# Patient Record
Sex: MALE | Race: WHITE | NOT HISPANIC OR LATINO | Employment: OTHER | URBAN - METROPOLITAN AREA
[De-identification: names, ages, dates, MRNs, and addresses within clinical notes are randomized per-mention and may not be internally consistent; named-entity substitution may affect disease eponyms.]

---

## 2017-01-31 ENCOUNTER — HOSPITAL ENCOUNTER (EMERGENCY)
Facility: HOSPITAL | Age: 72
Discharge: HOME | End: 2017-02-01
Attending: EMERGENCY MEDICINE | Admitting: EMERGENCY MEDICINE

## 2017-01-31 ENCOUNTER — APPOINTMENT (OUTPATIENT)
Dept: MRI IMAGING | Facility: HOSPITAL | Age: 72
End: 2017-01-31
Attending: EMERGENCY MEDICINE

## 2017-01-31 ENCOUNTER — APPOINTMENT (OUTPATIENT)
Dept: GENERAL RADIOLOGY | Facility: HOSPITAL | Age: 72
End: 2017-01-31

## 2017-01-31 DIAGNOSIS — H81.10 BENIGN POSITIONAL VERTIGO, UNSPECIFIED LATERALITY: Primary | ICD-10-CM

## 2017-01-31 LAB
ALBUMIN SERPL-MCNC: 4.5 G/DL (ref 3.5–5.2)
ALBUMIN/GLOB SERPL: 1.4 G/DL
ALP SERPL-CCNC: 66 U/L (ref 39–117)
ALT SERPL W P-5'-P-CCNC: 30 U/L (ref 1–41)
ANION GAP SERPL CALCULATED.3IONS-SCNC: 12.4 MMOL/L
AST SERPL-CCNC: 25 U/L (ref 1–40)
BACTERIA UR QL AUTO: ABNORMAL /HPF
BASOPHILS # BLD AUTO: 0.01 10*3/MM3 (ref 0–0.2)
BASOPHILS NFR BLD AUTO: 0.1 % (ref 0–1.5)
BILIRUB SERPL-MCNC: 0.5 MG/DL (ref 0.1–1.2)
BILIRUB UR QL STRIP: NEGATIVE
BUN BLD-MCNC: 27 MG/DL (ref 8–23)
BUN/CREAT SERPL: 31.8 (ref 7–25)
CALCIUM SPEC-SCNC: 9.5 MG/DL (ref 8.6–10.5)
CHLORIDE SERPL-SCNC: 100 MMOL/L (ref 98–107)
CLARITY UR: CLEAR
CO2 SERPL-SCNC: 25.6 MMOL/L (ref 22–29)
COLOR UR: YELLOW
CREAT BLD-MCNC: 0.85 MG/DL (ref 0.76–1.27)
DEPRECATED RDW RBC AUTO: 43.1 FL (ref 37–54)
EOSINOPHIL # BLD AUTO: 0 10*3/MM3 (ref 0–0.7)
EOSINOPHIL NFR BLD AUTO: 0 % (ref 0.3–6.2)
ERYTHROCYTE [DISTWIDTH] IN BLOOD BY AUTOMATED COUNT: 14 % (ref 11.5–14.5)
GFR SERPL CREATININE-BSD FRML MDRD: 89 ML/MIN/1.73
GLOBULIN UR ELPH-MCNC: 3.2 GM/DL
GLUCOSE BLD-MCNC: 145 MG/DL (ref 65–99)
GLUCOSE UR STRIP-MCNC: NEGATIVE MG/DL
HCT VFR BLD AUTO: 47.9 % (ref 40.4–52.2)
HGB BLD-MCNC: 16.1 G/DL (ref 13.7–17.6)
HGB UR QL STRIP.AUTO: ABNORMAL
HOLD SPECIMEN: NORMAL
HYALINE CASTS UR QL AUTO: ABNORMAL /LPF
IMM GRANULOCYTES # BLD: 0 10*3/MM3 (ref 0–0.03)
IMM GRANULOCYTES NFR BLD: 0 % (ref 0–0.5)
KETONES UR QL STRIP: NEGATIVE
LEUKOCYTE ESTERASE UR QL STRIP.AUTO: NEGATIVE
LYMPHOCYTES # BLD AUTO: 0.59 10*3/MM3 (ref 0.9–4.8)
LYMPHOCYTES NFR BLD AUTO: 7.3 % (ref 19.6–45.3)
MAGNESIUM SERPL-MCNC: 2.2 MG/DL (ref 1.6–2.4)
MCH RBC QN AUTO: 28.5 PG (ref 27–32.7)
MCHC RBC AUTO-ENTMCNC: 33.6 G/DL (ref 32.6–36.4)
MCV RBC AUTO: 84.9 FL (ref 79.8–96.2)
MONOCYTES # BLD AUTO: 0.21 10*3/MM3 (ref 0.2–1.2)
MONOCYTES NFR BLD AUTO: 2.6 % (ref 5–12)
NEUTROPHILS # BLD AUTO: 7.3 10*3/MM3 (ref 1.9–8.1)
NEUTROPHILS NFR BLD AUTO: 90 % (ref 42.7–76)
NITRITE UR QL STRIP: NEGATIVE
PH UR STRIP.AUTO: 6 [PH] (ref 5–8)
PLATELET # BLD AUTO: 227 10*3/MM3 (ref 140–500)
PMV BLD AUTO: 11 FL (ref 6–12)
POTASSIUM BLD-SCNC: 4.6 MMOL/L (ref 3.5–5.2)
PROT SERPL-MCNC: 7.7 G/DL (ref 6–8.5)
PROT UR QL STRIP: NEGATIVE
RBC # BLD AUTO: 5.64 10*6/MM3 (ref 4.6–6)
RBC # UR: ABNORMAL /HPF
REF LAB TEST METHOD: ABNORMAL
SODIUM BLD-SCNC: 138 MMOL/L (ref 136–145)
SP GR UR STRIP: 1.02 (ref 1–1.03)
SQUAMOUS #/AREA URNS HPF: ABNORMAL /HPF
TROPONIN T SERPL-MCNC: <0.01 NG/ML (ref 0–0.03)
UROBILINOGEN UR QL STRIP: ABNORMAL
WBC NRBC COR # BLD: 8.11 10*3/MM3 (ref 4.5–10.7)
WBC UR QL AUTO: ABNORMAL /HPF
WHOLE BLOOD HOLD SPECIMEN: NORMAL

## 2017-01-31 PROCEDURE — 80053 COMPREHEN METABOLIC PANEL: CPT | Performed by: EMERGENCY MEDICINE

## 2017-01-31 PROCEDURE — 36415 COLL VENOUS BLD VENIPUNCTURE: CPT | Performed by: EMERGENCY MEDICINE

## 2017-01-31 PROCEDURE — 71020 HC CHEST PA AND LATERAL: CPT

## 2017-01-31 PROCEDURE — 93005 ELECTROCARDIOGRAM TRACING: CPT

## 2017-01-31 PROCEDURE — 84484 ASSAY OF TROPONIN QUANT: CPT | Performed by: EMERGENCY MEDICINE

## 2017-01-31 PROCEDURE — 93010 ELECTROCARDIOGRAM REPORT: CPT | Performed by: INTERNAL MEDICINE

## 2017-01-31 PROCEDURE — 99284 EMERGENCY DEPT VISIT MOD MDM: CPT

## 2017-01-31 PROCEDURE — 85025 COMPLETE CBC W/AUTO DIFF WBC: CPT | Performed by: EMERGENCY MEDICINE

## 2017-01-31 PROCEDURE — 70551 MRI BRAIN STEM W/O DYE: CPT

## 2017-01-31 PROCEDURE — 81001 URINALYSIS AUTO W/SCOPE: CPT | Performed by: EMERGENCY MEDICINE

## 2017-01-31 PROCEDURE — 83735 ASSAY OF MAGNESIUM: CPT | Performed by: EMERGENCY MEDICINE

## 2017-01-31 RX ORDER — SODIUM CHLORIDE 0.9 % (FLUSH) 0.9 %
10 SYRINGE (ML) INJECTION AS NEEDED
Status: DISCONTINUED | OUTPATIENT
Start: 2017-01-31 | End: 2017-02-01 | Stop reason: HOSPADM

## 2017-02-01 ENCOUNTER — TELEPHONE (OUTPATIENT)
Dept: FAMILY MEDICINE CLINIC | Facility: CLINIC | Age: 72
End: 2017-02-01

## 2017-02-01 ENCOUNTER — TELEPHONE (OUTPATIENT)
Dept: SOCIAL WORK | Facility: HOSPITAL | Age: 72
End: 2017-02-01

## 2017-02-01 VITALS
DIASTOLIC BLOOD PRESSURE: 87 MMHG | SYSTOLIC BLOOD PRESSURE: 154 MMHG | WEIGHT: 200 LBS | OXYGEN SATURATION: 99 % | HEIGHT: 69 IN | TEMPERATURE: 96.4 F | RESPIRATION RATE: 18 BRPM | BODY MASS INDEX: 29.62 KG/M2 | HEART RATE: 70 BPM

## 2017-02-01 RX ORDER — MECLIZINE HYDROCHLORIDE 25 MG/1
25 TABLET ORAL 3 TIMES DAILY PRN
Qty: 10 TABLET | Refills: 0 | Status: SHIPPED | OUTPATIENT
Start: 2017-02-01 | End: 2018-06-11 | Stop reason: ALTCHOICE

## 2017-02-01 RX ORDER — MECLIZINE HYDROCHLORIDE 25 MG/1
25 TABLET ORAL 3 TIMES DAILY PRN
Qty: 30 TABLET | Refills: 1 | Status: SHIPPED | OUTPATIENT
Start: 2017-02-01 | End: 2017-08-01

## 2017-02-01 NOTE — ED NOTES
MRI screening form filled out and called to MRI tech Corrigan     Consuelo Elam RN  01/31/17 [id number]

## 2017-02-01 NOTE — ED NOTES
Pt states he woke up this morning around 0800 sweating and being dizzy. Pt is still dizzy. Pt also c/o LUQ abdominal discomfort.     Consuelo Elam RN  01/31/17 2020

## 2017-02-01 NOTE — ED PROVIDER NOTES
EMERGENCY DEPARTMENT ENCOUNTER    CHIEF COMPLAINT  Chief Complaint: Dizziness  History given by: patient  History limited by: none  Room Number: 18/18  PMD: Ankit Merritt MD      HPI:  Pt is a 71 y.o. male who presents complaining of dizziness onset this morning at 8AM when he got up. He describes his dizziness as feeling off-balanced and spinning. He says movement exacerbates his sx and being still helps to alleviate the sx. Currently doing better  He also c/o loss of appetite, mild nausea, chills, nasal congestion, increased tearing  He denies vomiting, tinnitus, visual changes, change in speech,  focal weakness in UE and LE bilat, HA, cough, fevers, chest pain, or SOB.    H/o arrhythmia and takes 2 baby ASA daily.     Duration:  Onset 8am  Onset: gradual  Timing: intermittent  Location: neuro  Radiation: N/A  Quality: spinning, off-balanced  Intensity/Severity: mild  Progression: unchanged  Associated Symptoms: loss of appetite, nausea, chills, nasal congestion, increased tearing  Aggravating Factors: movement, walking  Alleviating Factors: rest  Previous Episodes: denies  Treatment before arrival: none    PAST MEDICAL HISTORY  Active Ambulatory Problems     Diagnosis Date Noted   • CAD WITH 50% LAD plaque 02/12/2016   • Fatigue 02/12/2016   • White coat hypertension 02/12/2016   • Hyperlipidemia 02/12/2016   • Hypertension 02/12/2016   • Left ventricular hypertrophy 02/12/2016   • Palpitations 02/12/2016     Resolved Ambulatory Problems     Diagnosis Date Noted   • No Resolved Ambulatory Problems     Past Medical History   Diagnosis Date   • Cardiovascular risk factor    • Coronary artery disease        PAST SURGICAL HISTORY  Past Surgical History   Procedure Laterality Date   • Appendectomy     • Coronary angioplasty with stent placement         FAMILY HISTORY  Family History   Problem Relation Age of Onset   • Other Mother      CARDIAC DISORDER   • Other Father    • Stroke Father      CEREBROVASCULAR   •  Hypertension Other        SOCIAL HISTORY  Social History     Social History   • Marital status: Single     Spouse name: N/A   • Number of children: N/A   • Years of education: N/A     Occupational History   • Not on file.     Social History Main Topics   • Smoking status: Former Smoker   • Smokeless tobacco: Not on file   • Alcohol use No   • Drug use: No   • Sexual activity: Not on file     Other Topics Concern   • Not on file     Social History Narrative   • No narrative on file       ALLERGIES  Review of patient's allergies indicates no known allergies.    REVIEW OF SYSTEMS  Review of Systems   Constitutional: Positive for appetite change (loss of appetite) and chills. Negative for fever.   HENT: Positive for congestion, rhinorrhea and sinus pressure. Negative for sore throat and trouble swallowing.    Eyes: Negative for visual disturbance.        Increased tearing   Respiratory: Negative for cough and shortness of breath.    Cardiovascular: Negative for chest pain and leg swelling.   Gastrointestinal: Negative for abdominal pain, diarrhea and vomiting.   Endocrine: Negative.    Genitourinary: Negative for decreased urine volume and frequency.   Musculoskeletal: Negative for neck pain.   Skin: Negative for rash.   Allergic/Immunologic: Negative.    Neurological: Positive for dizziness and light-headedness. Negative for weakness and numbness.   Hematological: Negative.    Psychiatric/Behavioral: Negative.    All other systems reviewed and are negative.      PHYSICAL EXAM  ED Triage Vitals   Temp Heart Rate Resp BP SpO2   01/31/17 1603 01/31/17 1603 01/31/17 1603 01/31/17 1629 01/31/17 1603   96.4 °F (35.8 °C) 66 16 181/95 100 %      Temp src Heart Rate Source Patient Position BP Location FiO2 (%)   -- 01/31/17 2018 01/31/17 2018 01/31/17 2018 --    Monitor Lying Right arm        Physical Exam   Constitutional: He is oriented to person, place, and time and well-developed, well-nourished, and in no distress.   HENT:    Head: Normocephalic and atraumatic.   TMs clear bilat     Eyes: EOM are normal. Pupils are equal, round, and reactive to light. Right eye exhibits no nystagmus. Left eye exhibits no nystagmus.   Neck: Normal range of motion. Neck supple.   Cardiovascular: Normal rate, regular rhythm and normal heart sounds.    No murmur heard.  Pulmonary/Chest: Effort normal and breath sounds normal. No respiratory distress. He has no wheezes.   O2sats 100% on RA.    Abdominal: Soft. There is no tenderness. There is no rebound and no guarding.   Musculoskeletal: Normal range of motion. He exhibits no edema.   Neurological: He is alert and oriented to person, place, and time. He has normal sensation and normal strength. No cranial nerve deficit.   Finger to nose is normal.   No UE drift.   UE strength normal  No dysmetria.   Heel to shin normal bilat.   No focal deficits.   No ataxia on bedside ambulation.   Negative Romberg    Skin: Skin is warm and dry.   Psychiatric: Mood and affect normal.   Nursing note and vitals reviewed.      LAB RESULTS  Lab Results (last 24 hours)     Procedure Component Value Units Date/Time    CBC & Differential [[id number]] Collected:  01/31/17 1706    Specimen:  Blood Updated:  01/31/17 1735    Narrative:       The following orders were created for panel order CBC & Differential.  Procedure                               Abnormality         Status                     ---------                               -----------         ------                     CBC Auto Differential[[id number]]         Abnormal            Final result                 Please view results for these tests on the individual orders.    Comprehensive Metabolic Panel [[id number]]  (Abnormal) Collected:  01/31/17 1706    Specimen:  Blood Updated:  01/31/17 1746     Glucose 145 (H) mg/dL      BUN 27 (H) mg/dL      Creatinine 0.85 mg/dL      Sodium 138 mmol/L      Potassium 4.6 mmol/L      Chloride 100 mmol/L      CO2 25.6 mmol/L      Calcium  9.5 mg/dL      Total Protein 7.7 g/dL      Albumin 4.50 g/dL      ALT (SGPT) 30 U/L      AST (SGOT) 25 U/L      Alkaline Phosphatase 66 U/L      Total Bilirubin 0.5 mg/dL      eGFR Non African Amer 89 mL/min/1.73      Globulin 3.2 gm/dL      A/G Ratio 1.4 g/dL      BUN/Creatinine Ratio 31.8 (H)      Anion Gap 12.4 mmol/L     Narrative:       The MDRD GFR formula is only valid for adults with stable renal function between ages 18 and 70.    Troponin [[id number]]  (Normal) Collected:  01/31/17 1706    Specimen:  Blood Updated:  01/31/17 1748     Troponin T <0.010 ng/mL     Narrative:       Troponin T Reference Ranges:  Less than 0.03 ng/mL:    Negative for AMI  0.03 to 0.09 ng/mL:      Indeterminant for AMI  Greater than 0.09 ng/mL: Positive for AMI    Magnesium [[id number]]  (Normal) Collected:  01/31/17 1706    Specimen:  Blood Updated:  01/31/17 1746     Magnesium 2.2 mg/dL     CBC Auto Differential [[id number]]  (Abnormal) Collected:  01/31/17 1706    Specimen:  Blood Updated:  01/31/17 1735     WBC 8.11 10*3/mm3      RBC 5.64 10*6/mm3      Hemoglobin 16.1 g/dL      Hematocrit 47.9 %      MCV 84.9 fL      MCH 28.5 pg      MCHC 33.6 g/dL      RDW 14.0 %      RDW-SD 43.1 fl      MPV 11.0 fL      Platelets 227 10*3/mm3      Neutrophil % 90.0 (H) %      Lymphocyte % 7.3 (L) %      Monocyte % 2.6 (L) %      Eosinophil % 0.0 (L) %      Basophil % 0.1 %      Immature Grans % 0.0 %      Neutrophils, Absolute 7.30 10*3/mm3      Lymphocytes, Absolute 0.59 (L) 10*3/mm3      Monocytes, Absolute 0.21 10*3/mm3      Eosinophils, Absolute 0.00 10*3/mm3      Basophils, Absolute 0.01 10*3/mm3      Immature Grans, Absolute 0.00 10*3/mm3     Urinalysis With / Culture If Indicated [[id number]]  (Abnormal) Collected:  01/31/17 2120    Specimen:  Urine from Urine, Clean Catch Updated:  01/31/17 2142     Color, UA Yellow      Appearance, UA Clear      pH, UA 6.0      Specific Gravity, UA 1.018      Glucose, UA Negative      Ketones, UA Negative       Bilirubin, UA Negative      Blood, UA Moderate (2+) (A)      Protein, UA Negative      Leuk Esterase, UA Negative      Nitrite, UA Negative      Urobilinogen, UA 0.2 E.U./dL     Urinalysis, Microscopic Only [[id number]]  (Abnormal) Collected:  01/31/17 2120    Specimen:  Urine from Urine, Clean Catch Updated:  01/31/17 2143     RBC, UA 3-5 (A) /HPF      WBC, UA 0-2 (A) /HPF      Bacteria, UA None Seen /HPF      Squamous Epithelial Cells, UA 0-2 /HPF      Hyaline Casts, UA 0-2 /LPF      Methodology Automated Microscopy           I ordered the above labs and reviewed the results    RADIOLOGY  MRI Brain Without Contrast   Preliminary Result   No acute intracranial pathology identified. Small vessel ischemic   disease related changes and cortical atrophy.                  XR Chest 2 View   Final Result   No evidence for acute pulmonary process. Follow-up as   clinical indications persist.       This report was finalized on 1/31/2017 5:28 PM by Dr. David Corbin MD.               I ordered the above noted radiological studies. Interpreted by radiologist. Discussed with radiologist (Santino). Reviewed by me in PACS.       PROCEDURES  Procedures      PROGRESS AND CONSULTS  ED Course   Pt is not a tPA candidate due to length of duration of sx and low NIH score.    8:55 PM  Pt is able to ambulate at bed-side without complication. He says feeling a little dizzy when standing.   D/w pt risks/benefits for MRI, pt agrees with plan of care.   Ordered MRI for further evaluation.     9:41 PM  Ordered UA for further evaluation.     12:01 AM  Pt rechecked and is resting comfortably in NAD with stable vitals. D/w pt labs and MRI Brain results which showed no serious abnormalities. Decision to d/c was discussed. Pt was instructed to f/u with PMD. RTED warnings given. Pt understands and agrees with plan of care, all questions and concerns addressed.       MEDICAL DECISION MAKING  Results were reviewed/discussed with the patient  and they were also made aware of online access. Pt also made aware that some labs, such as cultures, will not be resulted during ER visit and follow up with PMD is necessary.     MDM  Number of Diagnoses or Management Options     Amount and/or Complexity of Data Reviewed  Clinical lab tests: reviewed and ordered (GLU - 145  BUN - 27  )  Tests in the radiology section of CPT®: reviewed and ordered (MRI BRAIN - negative acute     Independently viewed by me. Interpreted by radiologist. Discussed with Radiologist.         CXR - negative acute.    Independently viewed by me. Interpreted by radiologist  )  Tests in the medicine section of CPT®: reviewed and ordered (EKG           EKG time: 1954  Rhythm/Rate: 75, SR  P waves and MT: 1st degree AV block  QRS, axis: narrow QRS, normal axis  ST and T waves: nonspecific ST and T wave changes     Interpreted Contemporaneously by me, independently viewed  unchanged compared to prior 5/2015  )  Discussion of test results with the performing providers: yes (Dr. De - radiology )           DIAGNOSIS  Final diagnoses:   Benign positional vertigo, unspecified laterality       DISPOSITION  Discharged    DISCHARGE    Patient discharged in stable condition.    Reviewed implications of results, diagnosis, meds, responsibility to follow up, warning signs and symptoms of possible worsening, potential complications and reasons to return to ER.    Patient/Family voiced understanding of above instructions.    Discussed plan for discharge, as there is no emergent indication for admission.  Pt/family is agreeable and understands need for follow up and repeat testing.  Pt is aware that discharge does not mean that nothing is wrong but it indicates no emergency is present that requires admission and they must continue care with follow-up as given below or physician of their choice.     FOLLOW-UP  Ankit Merritt MD  [address] [place], [place] [id number] [phone]    In 2 days  Return if  pain, If symptoms worsen, shortness of breath,, fever, any concerns         Medication List      New Prescriptions          meclizine 25 MG tablet   Commonly known as:  ANTIVERT   Take 1 tablet by mouth 3 (Three) Times a Day As Needed for dizziness for   up to 10 doses.         Changed          ramipril 5 MG capsule   Commonly known as:  ALTACE   Take 1 capsule by mouth daily.   What changed:  Another medication with the same name was removed. Continue   taking this medication, and follow the directions you see here.               Latest Documented Vital Signs:  As of 12:12 AM  BP- 143/86 HR- 74 Temp- 96.4 °F (35.8 °C) O2 sat- 95%    --  Documentation assistance provided by casey Guy for Dr. Stone.  Information recorded by the alkaibe was done at my direction and has been verified and validated by me.       Arnold Guy  01/31/17 [id number]       Arnold Guy  02/01/17 0012       Brooks Stone MD  02/01/17 0051

## 2017-02-01 NOTE — TELEPHONE ENCOUNTER
----- Message from Jeanette Marshall sent at 2/1/2017 12:37 PM EST -----  Regarding: [facility] F/U & MED  Contact: [phone]  LDS: 1/31/17 ER  NEXT APPT: NONE    PATIENT NEEDS TO HAVE HOSPITAL FOLLOW UP FOR DIZZINESS AND NEEDS RX OF METROSOL 25 MG (CHART SAYS MECLIZINE 25 MG). HE WAS GIVEN 10 PILLS AT ER.    THANK YOU

## 2017-03-13 DIAGNOSIS — I11.9 HYPERTENSION WITH HEART DISEASE: ICD-10-CM

## 2017-03-15 RX ORDER — ATORVASTATIN CALCIUM 20 MG/1
TABLET, FILM COATED ORAL
Qty: 30 TABLET | Refills: 0 | Status: SHIPPED | OUTPATIENT
Start: 2017-03-15 | End: 2017-04-21 | Stop reason: SDUPTHER

## 2017-03-16 DIAGNOSIS — I11.9 HYPERTENSION WITH HEART DISEASE: ICD-10-CM

## 2017-03-16 RX ORDER — ATORVASTATIN CALCIUM 20 MG/1
TABLET, FILM COATED ORAL
Qty: 30 TABLET | Refills: 2 | OUTPATIENT
Start: 2017-03-16

## 2017-03-20 DIAGNOSIS — I11.9 HYPERTENSION WITH HEART DISEASE: ICD-10-CM

## 2017-03-21 RX ORDER — RAMIPRIL 5 MG/1
CAPSULE ORAL
Qty: 30 CAPSULE | Refills: 0 | Status: SHIPPED | OUTPATIENT
Start: 2017-03-21 | End: 2017-04-19 | Stop reason: SDUPTHER

## 2017-04-19 DIAGNOSIS — I11.9 HYPERTENSION WITH HEART DISEASE: ICD-10-CM

## 2017-04-21 DIAGNOSIS — I10 ESSENTIAL HYPERTENSION: ICD-10-CM

## 2017-04-21 DIAGNOSIS — I11.9 HYPERTENSION WITH HEART DISEASE: ICD-10-CM

## 2017-04-21 RX ORDER — ATORVASTATIN CALCIUM 20 MG/1
20 TABLET, FILM COATED ORAL DAILY
Qty: 30 TABLET | Refills: 2 | Status: SHIPPED | OUTPATIENT
Start: 2017-04-21 | End: 2017-08-20 | Stop reason: SDUPTHER

## 2017-04-21 RX ORDER — RAMIPRIL 5 MG/1
5 CAPSULE ORAL DAILY
Qty: 30 CAPSULE | Refills: 2 | Status: SHIPPED | OUTPATIENT
Start: 2017-04-21 | End: 2017-08-01

## 2017-04-23 RX ORDER — RAMIPRIL 5 MG/1
CAPSULE ORAL
Qty: 30 CAPSULE | Refills: 0 | Status: SHIPPED | OUTPATIENT
Start: 2017-04-23 | End: 2017-08-20 | Stop reason: SDUPTHER

## 2017-08-01 ENCOUNTER — OFFICE VISIT (OUTPATIENT)
Dept: CARDIOLOGY | Facility: CLINIC | Age: 72
End: 2017-08-01

## 2017-08-01 VITALS
DIASTOLIC BLOOD PRESSURE: 76 MMHG | HEART RATE: 77 BPM | BODY MASS INDEX: 28.5 KG/M2 | WEIGHT: 193 LBS | SYSTOLIC BLOOD PRESSURE: 158 MMHG

## 2017-08-01 DIAGNOSIS — I10 ESSENTIAL HYPERTENSION: ICD-10-CM

## 2017-08-01 DIAGNOSIS — IMO0001 WHITE COAT HYPERTENSION: Primary | ICD-10-CM

## 2017-08-01 PROBLEM — R94.31 ABNORMAL ELECTROCARDIOGRAM: Status: ACTIVE | Noted: 2017-08-01

## 2017-08-01 PROBLEM — R94.31 ABNORMAL ELECTROCARDIOGRAM: Status: RESOLVED | Noted: 2017-08-01 | Resolved: 2017-08-01

## 2017-08-01 PROBLEM — R94.39 ABNORMAL CARDIOVASCULAR STRESS TEST: Status: RESOLVED | Noted: 2017-08-01 | Resolved: 2017-08-01

## 2017-08-01 PROBLEM — R94.39 ABNORMAL CARDIOVASCULAR STRESS TEST: Status: ACTIVE | Noted: 2017-08-01

## 2017-08-01 PROBLEM — I49.9 IRREGULAR HEART RHYTHM: Status: ACTIVE | Noted: 2017-08-01

## 2017-08-01 PROCEDURE — 99214 OFFICE O/P EST MOD 30 MIN: CPT | Performed by: INTERNAL MEDICINE

## 2017-08-01 PROCEDURE — 93000 ELECTROCARDIOGRAM COMPLETE: CPT | Performed by: INTERNAL MEDICINE

## 2017-08-01 NOTE — PROGRESS NOTES
"Procedure   [facility]  Cardiology Progress Note    Patient Identification:  Name:Dennis Ryan  Age:71 y.o.  Sex: male  :  1945  MRN: [id number]           2017    Subjective:    Chief Complaint   Patient presents with   • Coronary Artery Disease       HPI     Mr Ryan is a 71  year old white male with coronary artery disease, cardiac catheterization in  shows mid-LAD with 50% stenosis, \"white-coat syndrome\" hypertension, who presents for cardiac followup.  He denies any chest pain. No shortness of breath, orthopnea or PND. No dizziness or syncope or edema. No recent change in weight. Lipid profile management is followed by  Dr. Arlene brar. Systolic Blood pressure is running \"120s to 130s.\"  Past echocardiogram with Doppler shows normal left ventricular function, mild left ventricular hypertrophy, mild mitral regurgitation. He had event recording done in  with no arrhythmia.    Review of Systems   Constitution: Negative for chills, fever and malaise/fatigue.   HENT: Negative for headaches.    Cardiovascular: Positive for irregular heartbeat. Negative for chest pain, leg swelling and palpitations.   Respiratory: Negative for shortness of breath and snoring.    Skin: Negative for color change.   Musculoskeletal: Negative for arthritis and joint pain.   Gastrointestinal: Negative for abdominal pain, nausea and vomiting.   Neurological: Negative for dizziness, light-headedness, numbness and vertigo.       The following portions of the patient's history were reviewed and updated as appropriate: allergies, current medications, past family history, past medical history, past social history,and problem list.    Past Medical History:   Diagnosis Date   • Cardiovascular risk factor     No diabetes.  No hypertension.  Positive for hyperlipidemia.  Positive for family history of coronary artery disease with father with CAD age 60s, mother with heart problem at age 84, brother and sister " x2 with hypertension   • Coronary artery disease    • Hypertension        Past Surgical History:   Procedure Laterality Date   • APPENDECTOMY     • CORONARY ANGIOPLASTY WITH STENT PLACEMENT         Social History     Social History   • Marital status: Single     Spouse name: N/A   • Number of children: N/A   • Years of education: N/A     Occupational History   • Not on file.     Social History Main Topics   • Smoking status: Former Smoker   • Smokeless tobacco: Not on file   • Alcohol use No   • Drug use: No   • Sexual activity: Not on file     Other Topics Concern   • Not on file     Social History Narrative       Family History   Problem Relation Age of Onset   • Other Mother      CARDIAC DISORDER   • Other Father    • Stroke Father      CEREBROVASCULAR   • Hypertension Other        Scheduled Meds:    Current Outpatient Prescriptions:   •  aspirin 81 MG tablet, Take 162 mg by mouth Daily., Disp: , Rfl:   •  atorvastatin (LIPITOR) 20 MG tablet, Take 1 tablet by mouth Daily., Disp: 30 tablet, Rfl: 2  •  meclizine (ANTIVERT) 25 MG tablet, Take 1 tablet by mouth 3 (Three) Times a Day As Needed for dizziness for up to 10 doses., Disp: 10 tablet, Rfl: 0  •  ramipril (ALTACE) 5 MG capsule, TAKE ONE CAPSULE BY MOUTH DAILY, Disp: 30 capsule, Rfl: 0    Objective:  /76  Pulse 77  Wt 193 lb (87.5 kg)  BMI 28.5 kg/m2     Physical Exam  Physical Exam:    General: No acute distress.    Skin: Warm and dry, no diaphoresis noted   HEENT: No ptosis; external ear and nose normal; oral mucosa moist   Neck: Supple; no carotid bruits; no JVD, Trachea mid line   Heart: S1S2 regular rate and rhythm; no murmurs; no gallop or rub appreciated, apex not displaced   Chest: Respirations regular, unlabored at rest, bilateral breath sounds have good air entry; no  wheezes auscultated.     Abdomen: Soft, non-tender, non-distended, positive bowel sounds  No hepatosplenomegaly   Extremities: Bilateral lower extremities have no pre-tibial  pitting edema; Radials are palpable   Neurological: Alert and oriented x 3; no new motor deficits,           ECG 12 Lead  Date/Time: 8/1/2017 4:21 PM  Performed by: UNA RÍOS  Authorized by: UNA RÍOS   Rhythm: sinus rhythm  Clinical impression: normal ECG  Comments: First degree AV block           Comparison to previous ECG:  Similar to previous ecg     Assessment:  Problem List Items Addressed This Visit        Cardiovascular and Mediastinum    White coat hypertension - Primary    Hypertension          Plan:    Overall clinially he has been doing good with no angina. His ECG shows sinus rhythm. His palpitations are better. He has mild to moderate plaque in the LAD and he says he is doing good and would like not to have stress test now.  I asked him to have his lipid panel checked with dr henson and if the LDL is more than 75, to increase the dose of the lipitor.    I gave him a vascular screening leaflet.      I not only counseled the patient today on the risk factor modification of significant factors noted in the assessment and plan, and I also recommended that the patient reduce salt and saturated animal fat intake in diet, about the advantages of plant based diet, as well as to perform scheduled exercise on a regular basis.    08/01/2017  Gio Ríos MD, FACC

## 2017-08-20 DIAGNOSIS — I11.9 HYPERTENSION WITH HEART DISEASE: ICD-10-CM

## 2017-08-22 RX ORDER — ATORVASTATIN CALCIUM 20 MG/1
TABLET, FILM COATED ORAL
Qty: 30 TABLET | Refills: 11 | Status: SHIPPED | OUTPATIENT
Start: 2017-08-22 | End: 2018-08-01 | Stop reason: SDUPTHER

## 2017-08-22 RX ORDER — RAMIPRIL 5 MG/1
CAPSULE ORAL
Qty: 90 CAPSULE | Refills: 3 | Status: SHIPPED | OUTPATIENT
Start: 2017-08-22 | End: 2018-06-11 | Stop reason: SDUPTHER

## 2018-04-02 ENCOUNTER — OFFICE VISIT (OUTPATIENT)
Dept: INTERNAL MEDICINE | Facility: CLINIC | Age: 73
End: 2018-04-02

## 2018-04-02 VITALS
OXYGEN SATURATION: 99 % | HEART RATE: 69 BPM | SYSTOLIC BLOOD PRESSURE: 176 MMHG | HEIGHT: 69 IN | WEIGHT: 198 LBS | RESPIRATION RATE: 16 BRPM | TEMPERATURE: 97.1 F | BODY MASS INDEX: 29.33 KG/M2 | DIASTOLIC BLOOD PRESSURE: 84 MMHG

## 2018-04-02 DIAGNOSIS — I10 ESSENTIAL HYPERTENSION: Primary | ICD-10-CM

## 2018-04-02 DIAGNOSIS — I51.7 LEFT VENTRICULAR HYPERTROPHY: ICD-10-CM

## 2018-04-02 DIAGNOSIS — E78.2 MIXED HYPERLIPIDEMIA: ICD-10-CM

## 2018-04-02 DIAGNOSIS — I25.10 CHRONIC CORONARY ARTERY DISEASE: ICD-10-CM

## 2018-04-02 DIAGNOSIS — R00.2 PALPITATIONS: ICD-10-CM

## 2018-04-02 PROCEDURE — 99214 OFFICE O/P EST MOD 30 MIN: CPT | Performed by: INTERNAL MEDICINE

## 2018-04-03 LAB
ALBUMIN SERPL-MCNC: 4.6 G/DL (ref 3.5–5.2)
ALBUMIN/GLOB SERPL: 1.7 G/DL
ALP SERPL-CCNC: 74 U/L (ref 39–117)
ALT SERPL-CCNC: 29 U/L (ref 1–41)
APPEARANCE UR: CLEAR
AST SERPL-CCNC: 22 U/L (ref 1–40)
BACTERIA #/AREA URNS HPF: NORMAL /HPF
BASOPHILS # BLD AUTO: 0.04 10*3/MM3 (ref 0–0.2)
BASOPHILS NFR BLD AUTO: 0.6 % (ref 0–1.5)
BILIRUB SERPL-MCNC: 0.5 MG/DL (ref 0.1–1.2)
BILIRUB UR QL STRIP: NEGATIVE
BUN SERPL-MCNC: 25 MG/DL (ref 8–23)
BUN/CREAT SERPL: 28.1 (ref 7–25)
CALCIUM SERPL-MCNC: 9.9 MG/DL (ref 8.6–10.5)
CASTS URNS MICRO: NORMAL
CHLORIDE SERPL-SCNC: 100 MMOL/L (ref 98–107)
CHOLEST SERPL-MCNC: 150 MG/DL (ref 0–200)
CO2 SERPL-SCNC: 26.6 MMOL/L (ref 22–29)
COLOR UR: YELLOW
CREAT SERPL-MCNC: 0.89 MG/DL (ref 0.76–1.27)
EOSINOPHIL # BLD AUTO: 0.13 10*3/MM3 (ref 0–0.7)
EOSINOPHIL NFR BLD AUTO: 2 % (ref 0.3–6.2)
EPI CELLS #/AREA URNS HPF: NORMAL /HPF
ERYTHROCYTE [DISTWIDTH] IN BLOOD BY AUTOMATED COUNT: 14.4 % (ref 11.5–14.5)
GFR SERPLBLD CREATININE-BSD FMLA CKD-EPI: 102 ML/MIN/1.73
GFR SERPLBLD CREATININE-BSD FMLA CKD-EPI: 84 ML/MIN/1.73
GLOBULIN SER CALC-MCNC: 2.7 GM/DL
GLUCOSE SERPL-MCNC: 120 MG/DL (ref 65–99)
GLUCOSE UR QL: NEGATIVE
HCT VFR BLD AUTO: 50.2 % (ref 40.4–52.2)
HDLC SERPL-MCNC: 50 MG/DL (ref 40–60)
HGB BLD-MCNC: 16.5 G/DL (ref 13.7–17.6)
HGB UR QL STRIP: (no result)
IMM GRANULOCYTES # BLD: 0 10*3/MM3 (ref 0–0.03)
IMM GRANULOCYTES NFR BLD: 0 % (ref 0–0.5)
KETONES UR QL STRIP: NEGATIVE
LDLC SERPL CALC-MCNC: 89 MG/DL (ref 0–100)
LDLC/HDLC SERPL: 1.78 {RATIO}
LEUKOCYTE ESTERASE UR QL STRIP: NEGATIVE
LYMPHOCYTES # BLD AUTO: 1.85 10*3/MM3 (ref 0.9–4.8)
LYMPHOCYTES NFR BLD AUTO: 28.2 % (ref 19.6–45.3)
MCH RBC QN AUTO: 29 PG (ref 27–32.7)
MCHC RBC AUTO-ENTMCNC: 32.9 G/DL (ref 32.6–36.4)
MCV RBC AUTO: 88.2 FL (ref 79.8–96.2)
MONOCYTES # BLD AUTO: 0.69 10*3/MM3 (ref 0.2–1.2)
MONOCYTES NFR BLD AUTO: 10.5 % (ref 5–12)
NEUTROPHILS # BLD AUTO: 3.85 10*3/MM3 (ref 1.9–8.1)
NEUTROPHILS NFR BLD AUTO: 58.7 % (ref 42.7–76)
NITRITE UR QL STRIP: NEGATIVE
PH UR STRIP: 6 [PH] (ref 5–8)
PLATELET # BLD AUTO: 210 10*3/MM3 (ref 140–500)
POTASSIUM SERPL-SCNC: 5.2 MMOL/L (ref 3.5–5.2)
PROT SERPL-MCNC: 7.3 G/DL (ref 6–8.5)
PROT UR QL STRIP: NEGATIVE
PSA SERPL-MCNC: 0.78 NG/ML (ref 0–4)
RBC # BLD AUTO: 5.69 10*6/MM3 (ref 4.6–6)
RBC #/AREA URNS HPF: NORMAL /HPF
SODIUM SERPL-SCNC: 139 MMOL/L (ref 136–145)
SP GR UR: 1.02 (ref 1–1.03)
T4 FREE SERPL-MCNC: 1.23 NG/DL (ref 0.93–1.7)
TRIGL SERPL-MCNC: 56 MG/DL (ref 0–150)
TSH SERPL DL<=0.005 MIU/L-ACNC: 2.43 MIU/ML (ref 0.27–4.2)
UROBILINOGEN UR STRIP-MCNC: (no result) MG/DL
VLDLC SERPL CALC-MCNC: 11.2 MG/DL (ref 5–40)
WBC # BLD AUTO: 6.56 10*3/MM3 (ref 4.5–10.7)
WBC #/AREA URNS HPF: NORMAL /HPF

## 2018-04-15 NOTE — PROGRESS NOTES
Subjective   Dennis Ryan is a 72 y.o. male.   He is here for hypertension mixed hyperlipidemia CAD left ventricular hypertrophy palpitations at times  History of Present Illness   He is here for follow-up for hypertension which is normally well controlled on current medication mixed hyper lipidemia which is well-controlled Lipitor CAD with no evidence of chest pain or anginal equivalents and stable at this time and left ventricular hypertrophy which is noted and chronic and palpitations at times as well for which we will have him see cardiology  The following portions of the patient's history were reviewed and updated as appropriate: allergies, current medications, past family history, past medical history, past social history, past surgical history and problem list.    Review of Systems   Cardiovascular: Positive for palpitations.   All other systems reviewed and are negative.      Objective   Physical Exam   Constitutional: He is oriented to person, place, and time. He appears well-developed and well-nourished. He is cooperative.   HENT:   Head: Normocephalic and atraumatic.   Right Ear: Hearing, tympanic membrane, external ear and ear canal normal.   Left Ear: Hearing, tympanic membrane, external ear and ear canal normal.   Nose: Nose normal.   Mouth/Throat: Uvula is midline, oropharynx is clear and moist and mucous membranes are normal.   Eyes: Conjunctivae, EOM and lids are normal. Pupils are equal, round, and reactive to light.   Neck: Phonation normal. Neck supple. Carotid bruit is not present.   Cardiovascular: Normal rate, regular rhythm and normal heart sounds.  Exam reveals no gallop and no friction rub.    No murmur heard.  Pulmonary/Chest: Effort normal and breath sounds normal. No respiratory distress.   Abdominal: Soft. Bowel sounds are normal. He exhibits no distension and no mass. There is no hepatosplenomegaly. There is no tenderness. There is no rebound and no guarding. No hernia.    Musculoskeletal: He exhibits no edema.   Neurological: He is alert and oriented to person, place, and time. Coordination and gait normal.   Skin: Skin is warm and dry.   Psychiatric: He has a normal mood and affect. His speech is normal and behavior is normal. Judgment and thought content normal.   Nursing note and vitals reviewed.      Assessment/Plan   Diagnoses and all orders for this visit:    Essential hypertension  -     Lipid Panel With LDL / HDL Ratio  -     T4, Free  -     TSH  -     CBC & Differential  -     Comprehensive Metabolic Panel  -     PSA DIAGNOSTIC  -     Urinalysis With Microscopic - Urine, Clean Catch    Mixed hyperlipidemia  -     Lipid Panel With LDL / HDL Ratio  -     T4, Free  -     TSH  -     CBC & Differential  -     Comprehensive Metabolic Panel  -     PSA DIAGNOSTIC  -     Urinalysis With Microscopic - Urine, Clean Catch    CAD WITH 50% LAD plaque  -     Ambulatory Referral to Cardiology    Left ventricular hypertrophy  -     Ambulatory Referral to Cardiology    Palpitations  -     Ambulatory Referral to Cardiology    Other orders  -     Microscopic Examination      Hypertension normally well controlled no changes today  Mixed hyper lipidemia stable on current medication no changes we will check lipid panel  CAD with plaque refer to cardiology  Left ventricular hypertrophy refer to cardiology for echo  Palpitations again refer to cardiology

## 2018-06-11 ENCOUNTER — OFFICE VISIT (OUTPATIENT)
Dept: CARDIOLOGY | Facility: CLINIC | Age: 73
End: 2018-06-11

## 2018-06-11 VITALS
WEIGHT: 193 LBS | HEIGHT: 69 IN | DIASTOLIC BLOOD PRESSURE: 90 MMHG | BODY MASS INDEX: 28.58 KG/M2 | HEART RATE: 64 BPM | SYSTOLIC BLOOD PRESSURE: 152 MMHG

## 2018-06-11 DIAGNOSIS — I11.9 HYPERTENSION WITH HEART DISEASE: ICD-10-CM

## 2018-06-11 DIAGNOSIS — I25.10 CORONARY ARTERY DISEASE INVOLVING NATIVE CORONARY ARTERY OF NATIVE HEART WITHOUT ANGINA PECTORIS: ICD-10-CM

## 2018-06-11 DIAGNOSIS — I65.29 STENOSIS OF CAROTID ARTERY, UNSPECIFIED LATERALITY: Primary | ICD-10-CM

## 2018-06-11 DIAGNOSIS — I51.7 LEFT VENTRICULAR HYPERTROPHY: ICD-10-CM

## 2018-06-11 PROCEDURE — 99214 OFFICE O/P EST MOD 30 MIN: CPT | Performed by: INTERNAL MEDICINE

## 2018-06-11 PROCEDURE — 93000 ELECTROCARDIOGRAM COMPLETE: CPT | Performed by: INTERNAL MEDICINE

## 2018-06-11 RX ORDER — RAMIPRIL 5 MG/1
5 CAPSULE ORAL DAILY
Qty: 30 CAPSULE | Refills: 11 | Status: SHIPPED | OUTPATIENT
Start: 2018-06-11 | End: 2019-06-25 | Stop reason: SDUPTHER

## 2018-06-11 NOTE — PROGRESS NOTES
Date of Office Visit: 2018  Encounter Provider: Juliann Lagos MD  Place of Service: [facility] CARDIOLOGY  Patient Name: Dennis Ryan  :1945    Chief complaint  Management of coronary artery disease, hypertension    History of Present Illness  The patient is a 72-year-old gentleman with history of hypertension and hyperlipidemia who was diagnosed in  with modest disease of the LAD.  He has been seen intermittently since then.  He had an echocardiogram in  which revealed normal systolic function with left ventricular hypertrophy and mild mitral regurgitation.  In  he also wore an event monitor which showed no arrhythmia.  When he saw Dr. Ríos last in 2017 a stress test was recommended; however, the patient deferred.      Since his last visit with me he states he was in the emergency room in January with hypertension and vertigo.  This has subsequently resolved.  He denies any chest pain, palpitations, syncope or near syncope.  He has chronic lower extremity edema, right greater than left.  He states his blood pressure at home is much lower, typically in the 120s/70s and he certainly has a component of white-coat hypertension.  He is not exercising regularly but is very active as a  and is on his feet all day long.      Past Medical History:   Diagnosis Date   • Cardiovascular risk factor     No diabetes.  No hypertension.  Positive for hyperlipidemia.  Positive for family history of coronary artery disease with father with CAD age 60s, mother with heart problem at age 84, brother and sister x2 with hypertension   • Coronary artery disease    • Heart palpitations    • Hyperlipidemia    • Hypertension    • Kidney stone    • Left ventricular hypertrophy    • Palpitations      Past Surgical History:   Procedure Laterality Date   • APPENDECTOMY     • CORONARY ANGIOPLASTY WITH STENT PLACEMENT     • CYSTOSCOPY W/ URETERAL STENT PLACEMENT Left  6/17/2018    Procedure: CYSTOSCOPY, LEFT STENT PLACEMENT;  Surgeon: Jaydon Pereyra Jr., MD;  Location: [facility];  Service: Urology   • OTHER SURGICAL HISTORY      groin rupture    • PROSTATE SURGERY       Outpatient Medications Prior to Visit   Medication Sig Dispense Refill   • atorvastatin (LIPITOR) 20 MG tablet TAKE ONE TABLET BY MOUTH DAILY 30 tablet 11   • aspirin 81 MG tablet Take 162 mg by mouth Daily.     • ramipril (ALTACE) 5 MG capsule TAKE ONE CAPSULE BY MOUTH DAILY 90 capsule 3   • meclizine (ANTIVERT) 25 MG tablet Take 1 tablet by mouth 3 (Three) Times a Day As Needed for dizziness for up to 10 doses. 10 tablet 0     No facility-administered medications prior to visit.        Allergies as of 06/11/2018   • (No Known Allergies)     Social History     Social History   • Marital status: Single     Spouse name: N/A   • Number of children: N/A   • Years of education: N/A     Occupational History   • Not on file.     Social History Main Topics   • Smoking status: Former Smoker   • Smokeless tobacco: Never Used   • Alcohol use No   • Drug use: No   • Sexual activity: Not on file     Other Topics Concern   • Not on file     Social History Narrative   • No narrative on file     Family History   Problem Relation Age of Onset   • Other Mother         CARDIAC DISORDER   • Other Father    • Stroke Father         CEREBROVASCULAR   • Heart disease Father    • Diabetes Father    • Hypertension Other    • Cancer Sister      Review of Systems   Constitution: Negative for fever, malaise/fatigue, weight gain and weight loss.   HENT: Negative for ear pain, hearing loss, nosebleeds and sore throat.    Eyes: Negative for double vision, pain, vision loss in left eye and vision loss in right eye.   Cardiovascular:        See history of present illness.   Respiratory: Negative for cough, shortness of breath, sleep disturbances due to breathing, snoring and wheezing.    Endocrine: Negative for cold intolerance, heat  "intolerance and polyuria.   Skin: Negative for itching, poor wound healing and rash.   Musculoskeletal: Negative for joint pain, joint swelling and myalgias.   Gastrointestinal: Negative for abdominal pain, diarrhea, hematochezia, nausea and vomiting.   Genitourinary: Negative for hematuria and hesitancy.   Neurological: Negative for numbness, paresthesias and seizures.   Psychiatric/Behavioral: Negative for depression. The patient is not nervous/anxious.         Objective:     Vitals:    06/11/18 1107   BP: 152/90   BP Location: Right arm   Pulse: 64   Weight: 87.5 kg (193 lb)   Height: 175.3 cm (69\")     Body mass index is 28.5 kg/m².    Physical Exam   Constitutional: He is oriented to person, place, and time. He appears well-developed and well-nourished.   HENT:   Head: Normocephalic.   Nose: Nose normal.   Mouth/Throat: Oropharynx is clear and moist.   Eyes: Conjunctivae and EOM are normal. Pupils are equal, round, and reactive to light. Right eye exhibits no discharge. No scleral icterus.   Neck: Normal range of motion. Neck supple. No JVD present. No thyromegaly present.   Cardiovascular: Normal rate, regular rhythm, normal heart sounds and intact distal pulses.  Exam reveals no gallop and no friction rub.    No murmur heard.  Pulses:       Carotid pulses are 2+ on the right side, and 2+ on the left side.       Radial pulses are 2+ on the right side, and 2+ on the left side.        Femoral pulses are 2+ on the right side, and 2+ on the left side.       Popliteal pulses are 2+ on the right side, and 2+ on the left side.        Dorsalis pedis pulses are 2+ on the right side, and 2+ on the left side.        Posterior tibial pulses are 2+ on the right side, and 2+ on the left side.   Pulmonary/Chest: Effort normal and breath sounds normal. No respiratory distress. He has no wheezes. He has no rales.   Abdominal: Soft. Bowel sounds are normal. He exhibits no distension. There is no hepatosplenomegaly. There is no " tenderness. There is no rebound.   Musculoskeletal: Normal range of motion. He exhibits no edema or tenderness.   Neurological: He is alert and oriented to person, place, and time.   Skin: Skin is warm and dry. No rash noted. No erythema.   Psychiatric: He has a normal mood and affect. His behavior is normal. Judgment and thought content normal.   Vitals reviewed.    Lab Review:     ECG 12 Lead  Date/Time: 6/11/2018 11:08 AM  Performed by: CAROLINA SHETTY  Authorized by: CAROLINA SHETTY   Comparison: compared with previous ECG   Similar to previous ECG  Rhythm: sinus rhythm  Conduction: 1st degree  Other findings: PRWP  Clinical impression: abnormal ECG          Assessment:       Diagnosis Plan   1. Stenosis of carotid artery, unspecified laterality  Duplex Carotid Ultrasound CAR   2. Hypertension with heart disease  ECG 12 Lead    ramipril (ALTACE) 5 MG capsule    Adult Stress Echo W/ Cont or Stress Agent if Necessary Per Protocol   3. Coronary artery disease involving native coronary artery of native heart without angina pectoris  Adult Stress Echo W/ Cont or Stress Agent if Necessary Per Protocol   4. Left ventricular hypertrophy  Adult Stress Echo W/ Cont or Stress Agent if Necessary Per Protocol     Plan:       1.  Modest coronary artery disease. It has been many years since his last coronary evaluation. I therefore will check a stress echocardiogram to assess for ischemia. In addition, if this is negative I have asked him to start a regular exercise regimen, at least count the steps he is performing during the day aiming for 10,000 steps a day.  2.  Hypertension. He will increase his exercise regimen which should help with his weight as well as hypertension.  3.  Carotid artery stenosis. Will check a carotid artery Doppler ultrasound.  4.  Dyslipidemia.   5.  Hypertension, as above.     Coronary Artery Disease  Assessment  • The patient has no angina    Plan  • Lifestyle modifications discussed include adhering to a  heart healthy diet, regular exercise and regular monitoring of cholesterol and blood pressure    Subjective - Objective  • Current antiplatelet therapy includes aspirin 81 mg           Discharge Medications          Accurate as of 6/11/18 11:59 PM. If you have any questions, ask your nurse or doctor.               Changes to Medications      Instructions Start Date   ramipril 5 MG capsule  Commonly known as:  ALTACE  What changed:  See the new instructions.  Changed by:  Juliann Lagos MD   5 mg, Oral, Daily         Continue These Medications      Instructions Start Date   aspirin 81 MG tablet   162 mg, Oral, Daily      atorvastatin 20 MG tablet  Commonly known as:  LIPITOR   TAKE ONE TABLET BY MOUTH DAILY         Stop These Medications    meclizine 25 MG tablet  Commonly known as:  ANTIVERT  Stopped by:  Juliann Lagos MD            New Medications Ordered This Visit   Medications   • ramipril (ALTACE) 5 MG capsule     Sig: Take 1 capsule by mouth Daily.     Dispense:  30 capsule     Refill:  11       Dictated utilizing Dragon dictation

## 2018-06-15 ENCOUNTER — HOSPITAL ENCOUNTER (EMERGENCY)
Facility: HOSPITAL | Age: 73
Discharge: HOME | End: 2018-06-15
Attending: EMERGENCY MEDICINE | Admitting: EMERGENCY MEDICINE

## 2018-06-15 ENCOUNTER — APPOINTMENT (OUTPATIENT)
Dept: CT IMAGING | Facility: HOSPITAL | Age: 73
End: 2018-06-15

## 2018-06-15 VITALS
BODY MASS INDEX: 28.14 KG/M2 | OXYGEN SATURATION: 96 % | TEMPERATURE: 98.4 F | DIASTOLIC BLOOD PRESSURE: 79 MMHG | HEART RATE: 80 BPM | HEIGHT: 69 IN | SYSTOLIC BLOOD PRESSURE: 156 MMHG | WEIGHT: 190 LBS | RESPIRATION RATE: 16 BRPM

## 2018-06-15 DIAGNOSIS — N23 RENAL COLIC ON LEFT SIDE: Primary | ICD-10-CM

## 2018-06-15 LAB
ALBUMIN SERPL-MCNC: 4.5 G/DL (ref 3.5–5.2)
ALBUMIN/GLOB SERPL: 1.5 G/DL
ALP SERPL-CCNC: 75 U/L (ref 39–117)
ALT SERPL W P-5'-P-CCNC: 27 U/L (ref 1–41)
ANION GAP SERPL CALCULATED.3IONS-SCNC: 14.5 MMOL/L
AST SERPL-CCNC: 30 U/L (ref 1–40)
BACTERIA UR QL AUTO: ABNORMAL /HPF
BASOPHILS # BLD AUTO: 0.02 10*3/MM3 (ref 0–0.2)
BASOPHILS NFR BLD AUTO: 0.2 % (ref 0–1.5)
BILIRUB SERPL-MCNC: 1.4 MG/DL (ref 0.1–1.2)
BILIRUB UR QL STRIP: NEGATIVE
BUN BLD-MCNC: 25 MG/DL (ref 8–23)
BUN/CREAT SERPL: 18.9 (ref 7–25)
CALCIUM SPEC-SCNC: 9.5 MG/DL (ref 8.6–10.5)
CHLORIDE SERPL-SCNC: 100 MMOL/L (ref 98–107)
CLARITY UR: CLEAR
CO2 SERPL-SCNC: 22.5 MMOL/L (ref 22–29)
COLOR UR: YELLOW
CREAT BLD-MCNC: 1.32 MG/DL (ref 0.76–1.27)
DEPRECATED RDW RBC AUTO: 42.8 FL (ref 37–54)
EOSINOPHIL # BLD AUTO: 0.01 10*3/MM3 (ref 0–0.7)
EOSINOPHIL NFR BLD AUTO: 0.1 % (ref 0.3–6.2)
ERYTHROCYTE [DISTWIDTH] IN BLOOD BY AUTOMATED COUNT: 13.9 % (ref 11.5–14.5)
GFR SERPL CREATININE-BSD FRML MDRD: 53 ML/MIN/1.73
GLOBULIN UR ELPH-MCNC: 3 GM/DL
GLUCOSE BLD-MCNC: 109 MG/DL (ref 65–99)
GLUCOSE UR STRIP-MCNC: NEGATIVE MG/DL
HCT VFR BLD AUTO: 46.1 % (ref 40.4–52.2)
HGB BLD-MCNC: 15.5 G/DL (ref 13.7–17.6)
HGB UR QL STRIP.AUTO: ABNORMAL
HYALINE CASTS UR QL AUTO: ABNORMAL /LPF
IMM GRANULOCYTES # BLD: 0.03 10*3/MM3 (ref 0–0.03)
IMM GRANULOCYTES NFR BLD: 0.3 % (ref 0–0.5)
KETONES UR QL STRIP: ABNORMAL
LEUKOCYTE ESTERASE UR QL STRIP.AUTO: ABNORMAL
LIPASE SERPL-CCNC: 20 U/L (ref 13–60)
LYMPHOCYTES # BLD AUTO: 1.28 10*3/MM3 (ref 0.9–4.8)
LYMPHOCYTES NFR BLD AUTO: 13 % (ref 19.6–45.3)
MCH RBC QN AUTO: 28.7 PG (ref 27–32.7)
MCHC RBC AUTO-ENTMCNC: 33.6 G/DL (ref 32.6–36.4)
MCV RBC AUTO: 85.2 FL (ref 79.8–96.2)
MONOCYTES # BLD AUTO: 0.82 10*3/MM3 (ref 0.2–1.2)
MONOCYTES NFR BLD AUTO: 8.3 % (ref 5–12)
NEUTROPHILS # BLD AUTO: 7.71 10*3/MM3 (ref 1.9–8.1)
NEUTROPHILS NFR BLD AUTO: 78.1 % (ref 42.7–76)
NITRITE UR QL STRIP: NEGATIVE
PH UR STRIP.AUTO: <=5 [PH] (ref 5–8)
PLATELET # BLD AUTO: 196 10*3/MM3 (ref 140–500)
PMV BLD AUTO: 11.5 FL (ref 6–12)
POTASSIUM BLD-SCNC: 4.5 MMOL/L (ref 3.5–5.2)
PROT SERPL-MCNC: 7.5 G/DL (ref 6–8.5)
PROT UR QL STRIP: NEGATIVE
RBC # BLD AUTO: 5.41 10*6/MM3 (ref 4.6–6)
RBC # UR: ABNORMAL /HPF
REF LAB TEST METHOD: ABNORMAL
SODIUM BLD-SCNC: 137 MMOL/L (ref 136–145)
SP GR UR STRIP: 1.01 (ref 1–1.03)
SQUAMOUS #/AREA URNS HPF: ABNORMAL /HPF
UROBILINOGEN UR QL STRIP: ABNORMAL
WBC NRBC COR # BLD: 9.87 10*3/MM3 (ref 4.5–10.7)
WBC UR QL AUTO: ABNORMAL /HPF

## 2018-06-15 PROCEDURE — 99283 EMERGENCY DEPT VISIT LOW MDM: CPT

## 2018-06-15 PROCEDURE — 81001 URINALYSIS AUTO W/SCOPE: CPT | Performed by: EMERGENCY MEDICINE

## 2018-06-15 PROCEDURE — 85025 COMPLETE CBC W/AUTO DIFF WBC: CPT | Performed by: EMERGENCY MEDICINE

## 2018-06-15 PROCEDURE — 80053 COMPREHEN METABOLIC PANEL: CPT | Performed by: EMERGENCY MEDICINE

## 2018-06-15 PROCEDURE — 83690 ASSAY OF LIPASE: CPT | Performed by: EMERGENCY MEDICINE

## 2018-06-15 PROCEDURE — 74176 CT ABD & PELVIS W/O CONTRAST: CPT

## 2018-06-15 RX ORDER — HYDROCODONE BITARTRATE AND ACETAMINOPHEN 5; 325 MG/1; MG/1
1 TABLET ORAL EVERY 6 HOURS PRN
Qty: 15 TABLET | Refills: 0 | Status: SHIPPED | OUTPATIENT
Start: 2018-06-15 | End: 2018-10-11

## 2018-06-15 RX ORDER — TAMSULOSIN HYDROCHLORIDE 0.4 MG/1
1 CAPSULE ORAL DAILY
Qty: 10 CAPSULE | Refills: 0 | Status: SHIPPED | OUTPATIENT
Start: 2018-06-15 | End: 2018-10-11

## 2018-06-15 NOTE — ED PROVIDER NOTES
EMERGENCY DEPARTMENT ENCOUNTER    CHIEF COMPLAINT  Chief Complaint: Left flank pain  History given by: Pt  History limited by: none  Room Number: ROBRETS/E  PMD: Edgar Cr MD      HPI:  Pt is a 72 y.o. male who presents complaining of intermittent left flank pain that has been ongoing for some time and began to get better. Pt states the pain returned at 9 AM this morning. Pt states pain is exacerbated by laying supine. He denies any urinary symptoms.    Duration:  Some time but returned today  Onset: gradual  Timing: intermittent  Location: left flank  Radiation: none stated  Quality: pain  Intensity/Severity: moderate  Progression: Pain began to get better but has returned today  Associated Symptoms: none stated  Aggravating Factors: laying supine  Alleviating Factors: none stated  Previous Episodes: none stated  Treatment before arrival: none stated    PAST MEDICAL HISTORY  Active Ambulatory Problems     Diagnosis Date Noted   • CAD WITH 50% LAD plaque 02/12/2016   • Fatigue 02/12/2016   • White coat hypertension 02/12/2016   • Hyperlipidemia 02/12/2016   • Hypertension 02/12/2016   • Left ventricular hypertrophy 02/12/2016   • Palpitations 02/12/2016   • Irregular heart rhythm 08/01/2017   • Visit for suture removal 09/12/2013     Resolved Ambulatory Problems     Diagnosis Date Noted   • Abnormal electrocardiogram 08/01/2017   • Abnormal cardiovascular stress test 08/01/2017     Past Medical History:   Diagnosis Date   • Cardiovascular risk factor    • Coronary artery disease    • Heart palpitations    • Hyperlipidemia    • Hypertension    • Left ventricular hypertrophy    • Palpitations        PAST SURGICAL HISTORY  Past Surgical History:   Procedure Laterality Date   • APPENDECTOMY     • CORONARY ANGIOPLASTY WITH STENT PLACEMENT     • OTHER SURGICAL HISTORY      groin rupture    • PROSTATE SURGERY         FAMILY HISTORY  Family History   Problem Relation Age of Onset   • Other Mother         CARDIAC  DISORDER   • Other Father    • Stroke Father         CEREBROVASCULAR   • Heart disease Father    • Diabetes Father    • Hypertension Other    • Cancer Sister        SOCIAL HISTORY  Social History     Social History   • Marital status: Single     Spouse name: N/A   • Number of children: N/A   • Years of education: N/A     Occupational History   • Not on file.     Social History Main Topics   • Smoking status: Former Smoker   • Smokeless tobacco: Never Used   • Alcohol use No   • Drug use: No   • Sexual activity: Not on file     Other Topics Concern   • Not on file     Social History Narrative   • No narrative on file       ALLERGIES  Patient has no known allergies.    REVIEW OF SYSTEMS  Review of Systems   Constitutional: Negative for activity change, appetite change and fever.   HENT: Negative for congestion and sore throat.    Eyes: Negative.    Respiratory: Negative for cough and shortness of breath.    Cardiovascular: Negative for chest pain and leg swelling.   Gastrointestinal: Negative for abdominal pain, diarrhea and vomiting.   Endocrine: Negative.    Genitourinary: Negative for decreased urine volume and dysuria.   Musculoskeletal: Negative for neck pain.        Left flank pain   Skin: Negative for rash and wound.   Allergic/Immunologic: Negative.    Neurological: Negative for weakness, numbness and headaches.   Hematological: Negative.    Psychiatric/Behavioral: Negative.    All other systems reviewed and are negative.      PHYSICAL EXAM  ED Triage Vitals   Temp Heart Rate Resp BP SpO2   06/15/18 0145 06/15/18 0144 06/15/18 0144 06/15/18 0147 06/15/18 0144   99.4 °F (37.4 °C) 94 18 145/76 97 %      Temp src Heart Rate Source Patient Position BP Location FiO2 (%)   06/15/18 0145 06/15/18 0144 06/15/18 0147 06/15/18 0147 --   Tympanic Monitor Sitting Right arm        Physical Exam   Constitutional: He is oriented to person, place, and time. No distress.   HENT:   Head: Normocephalic and atraumatic.   Eyes:  EOM are normal. Pupils are equal, round, and reactive to light.   Neck: Normal range of motion. Neck supple.   Cardiovascular: Normal rate, regular rhythm and normal heart sounds.    Pulmonary/Chest: Effort normal and breath sounds normal. No respiratory distress.   Abdominal: Soft. There is tenderness in the left upper quadrant. There is no rebound and no guarding.   Musculoskeletal: Normal range of motion. He exhibits no edema.   Neurological: He is alert and oriented to person, place, and time. He has normal sensation and normal strength.   Skin: Skin is warm and dry.   Psychiatric: Mood and affect normal.   Nursing note and vitals reviewed.      LAB RESULTS  Lab Results (last 24 hours)     Procedure Component Value Units Date/Time    Urinalysis With / Microscopic If Indicated (No Culture) - Urine, Clean Catch [[id number]]  (Abnormal) Collected:  06/15/18 0343    Specimen:  Urine from Urine, Clean Catch Updated:  06/15/18 0358     Color, UA Yellow     Appearance, UA Clear     pH, UA <=5.0     Specific Gravity, UA 1.008     Glucose, UA Negative     Ketones, UA 15 mg/dL (1+) (A)     Bilirubin, UA Negative     Blood, UA Large (3+) (A)     Protein, UA Negative     Leuk Esterase, UA Small (1+) (A)     Nitrite, UA Negative     Urobilinogen, UA 0.2 E.U./dL    Urinalysis, Microscopic Only - Urine, Clean Catch [[id number]]  (Abnormal) Collected:  06/15/18 0343    Specimen:  Urine from Urine, Clean Catch Updated:  06/15/18 0358     RBC, UA 3-5 (A) /HPF      WBC, UA 6-12 (A) /HPF      Bacteria, UA None Seen /HPF      Squamous Epithelial Cells, UA 0-2 /HPF      Hyaline Casts, UA 0-2 /LPF      Methodology Automated Microscopy    CBC & Differential [[id number]] Collected:  06/15/18 0345    Specimen:  Blood Updated:  06/15/18 0354    Narrative:       The following orders were created for panel order CBC & Differential.  Procedure                               Abnormality         Status                     ---------                                -----------         ------                     CBC Auto Differential[[id number]]        Abnormal            Final result                 Please view results for these tests on the individual orders.    Comprehensive Metabolic Panel [[id number]]  (Abnormal) Collected:  06/15/18 0345    Specimen:  Blood Updated:  06/15/18 0415     Glucose 109 (H) mg/dL      BUN 25 (H) mg/dL      Creatinine 1.32 (H) mg/dL      Sodium 137 mmol/L      Potassium 4.5 mmol/L      Chloride 100 mmol/L      CO2 22.5 mmol/L      Calcium 9.5 mg/dL      Total Protein 7.5 g/dL      Albumin 4.50 g/dL      ALT (SGPT) 27 U/L      AST (SGOT) 30 U/L      Alkaline Phosphatase 75 U/L      Total Bilirubin 1.4 (H) mg/dL      eGFR Non African Amer 53 (L) mL/min/1.73      Globulin 3.0 gm/dL      A/G Ratio 1.5 g/dL      BUN/Creatinine Ratio 18.9     Anion Gap 14.5 mmol/L     Narrative:       The MDRD GFR formula is only valid for adults with stable renal function between ages 18 and 70.    Lipase [[id number]]  (Normal) Collected:  06/15/18 0345    Specimen:  Blood Updated:  06/15/18 0415     Lipase 20 U/L     CBC Auto Differential [[id number]]  (Abnormal) Collected:  06/15/18 0345    Specimen:  Blood Updated:  06/15/18 0354     WBC 9.87 10*3/mm3      RBC 5.41 10*6/mm3      Hemoglobin 15.5 g/dL      Hematocrit 46.1 %      MCV 85.2 fL      MCH 28.7 pg      MCHC 33.6 g/dL      RDW 13.9 %      RDW-SD 42.8 fl      MPV 11.5 fL      Platelets 196 10*3/mm3      Neutrophil % 78.1 (H) %      Lymphocyte % 13.0 (L) %      Monocyte % 8.3 %      Eosinophil % 0.1 (L) %      Basophil % 0.2 %      Immature Grans % 0.3 %      Neutrophils, Absolute 7.71 10*3/mm3      Lymphocytes, Absolute 1.28 10*3/mm3      Monocytes, Absolute 0.82 10*3/mm3      Eosinophils, Absolute 0.01 10*3/mm3      Basophils, Absolute 0.02 10*3/mm3      Immature Grans, Absolute 0.03 10*3/mm3           I ordered the above labs and reviewed the results    RADIOLOGY  CT Abdomen Pelvis Without  Contrast   Final Result   1.  4.5 mm left UPJ calculus with mild hydronephrosis.   2. 26 mm upper pole left renal lesion, favor cyst.   3. Very mild prostate gland enlargement                   This study was performed with techniques to keep radiation doses as low   as reasonably achievable (ALARA). Individualized dose reduction   techniques using automated exposure control or adjustment of mA and/or   kV according to the patient size were employed.        This report was finalized on 6/15/2018 4:56 AM by Dave Khan M.D.               I ordered the above noted radiological studies. Interpreted by radiologist.  Reviewed by me in PACS.       PROCEDURES  Procedures      PROGRESS AND CONSULTS  3:22 AM  Ordered blood work for further evaluation    4:26 AM  Ordered CT abd for further evaluation.    5:07 AM  BP- 146/69 HR- 83 Temp- 99.4 °F (37.4 °C) (Tympanic) O2 sat- 97%  Rechecked the patient who is in NAD and is resting comfortably. Informed pt of the results of CT showing kidney stone and the plan to discharge him with pain medication and flomax for treatment until he can follow up with [facility].Pt understands and agrees with the plan, all questions answered.      MEDICAL DECISION MAKING  Results were reviewed/discussed with the patient and they were also made aware of online access. Pt also made aware that some labs, such as cultures, will not be resulted during ER visit and follow up with PMD is necessary.     MDM  Number of Diagnoses or Management Options     Amount and/or Complexity of Data Reviewed  Clinical lab tests: reviewed and ordered (RBC, UA 3-5 (A)   WBC, UA 6-12 (A)  Glucose 109 (H)   BUN 25 (H)   Creatinine 1.32 (H) )  Tests in the radiology section of CPT®: reviewed and ordered (CT Abdomen Pelvis Without Contrast  Final Result  1.  4.5 mm left UPJ calculus with mild hydronephrosis.  2. 26 mm upper pole left renal lesion, favor cyst.  3. Very mild prostate gland enlargement  )  Decide to obtain  previous medical records or to obtain history from someone other than the patient: yes  Review and summarize past medical records: yes  Independent visualization of images, tracings, or specimens: yes           DIAGNOSIS  Final diagnoses:   Renal colic on left side       DISPOSITION  DISCHARGE    Patient discharged in stable condition.    Reviewed implications of results, diagnosis, meds, responsibility to follow up, warning signs and symptoms of possible worsening, potential complications and reasons to return to ER.    Patient/Family voiced understanding of above instructions.    Discussed plan for discharge, as there is no emergent indication for admission. Patient referred to primary care provider for BP management due to today's BP. Pt/family is agreeable and understands need for follow up and repeat testing.  Pt is aware that discharge does not mean that nothing is wrong but it indicates no emergency is present that requires admission and they must continue care with follow-up as given below or physician of their choice.     FOLLOW-UP  Dayron Dalton MD  [address] [place] [address] [place] [address]  [phone]    Schedule an appointment as soon as possible for a visit       [facility] Emergency Department  [address] [facility] [address] [phone]    If symptoms worsen         Medication List      New Prescriptions    HYDROcodone-acetaminophen 5-325 MG per tablet  Commonly known as:  NORCO  Take 1 tablet by mouth Every 6 (Six) Hours As Needed for Moderate Pain .     tamsulosin 0.4 MG capsule 24 hr capsule  Commonly known as:  FLOMAX  Take 1 capsule by mouth Daily.              Latest Documented Vital Signs:  As of 6:30 AM  BP- 156/79 HR- 80 Temp- 98.4 °F (36.9 °C) (Oral) O2 sat- 96%    --  Documentation assistance provided by casey Kenney for .  Information recorded by the casey was done at my direction and has been verified and validated by me.      Prieto Kenney  06/15/18 0508       Alvaro Watson MD  06/15/18 0605

## 2018-06-15 NOTE — ED TRIAGE NOTES
Pt reports left sided pain since February 16th.  Pt has list of episodes and descriptions of pain with him.  Pt denies urinary symptoms, pt denies SOA, denies n/v.  No apparent distress noted.

## 2018-06-17 ENCOUNTER — ANESTHESIA (OUTPATIENT)
Dept: PERIOP | Facility: HOSPITAL | Age: 73
End: 2018-06-17

## 2018-06-17 ENCOUNTER — HOSPITAL ENCOUNTER (OUTPATIENT)
Facility: HOSPITAL | Age: 73
Setting detail: OBSERVATION
Discharge: HOME | End: 2018-06-17
Attending: EMERGENCY MEDICINE | Admitting: UROLOGY

## 2018-06-17 ENCOUNTER — ANESTHESIA EVENT (OUTPATIENT)
Dept: PERIOP | Facility: HOSPITAL | Age: 73
End: 2018-06-17

## 2018-06-17 ENCOUNTER — APPOINTMENT (OUTPATIENT)
Dept: GENERAL RADIOLOGY | Facility: HOSPITAL | Age: 73
End: 2018-06-17

## 2018-06-17 VITALS
RESPIRATION RATE: 12 BRPM | DIASTOLIC BLOOD PRESSURE: 79 MMHG | WEIGHT: 190 LBS | BODY MASS INDEX: 28.14 KG/M2 | OXYGEN SATURATION: 97 % | HEIGHT: 69 IN | TEMPERATURE: 98 F | HEART RATE: 63 BPM | SYSTOLIC BLOOD PRESSURE: 152 MMHG

## 2018-06-17 DIAGNOSIS — N17.9 ACUTE RENAL FAILURE, UNSPECIFIED ACUTE RENAL FAILURE TYPE (HCC): ICD-10-CM

## 2018-06-17 DIAGNOSIS — N20.0 KIDNEY STONE ON LEFT SIDE: Primary | ICD-10-CM

## 2018-06-17 LAB
ANION GAP SERPL CALCULATED.3IONS-SCNC: 14.1 MMOL/L
BUN BLD-MCNC: 30 MG/DL (ref 8–23)
BUN/CREAT SERPL: 18.4 (ref 7–25)
CALCIUM SPEC-SCNC: 9.2 MG/DL (ref 8.6–10.5)
CHLORIDE SERPL-SCNC: 101 MMOL/L (ref 98–107)
CO2 SERPL-SCNC: 21.9 MMOL/L (ref 22–29)
CREAT BLD-MCNC: 1.63 MG/DL (ref 0.76–1.27)
GFR SERPL CREATININE-BSD FRML MDRD: 42 ML/MIN/1.73
GLUCOSE BLD-MCNC: 126 MG/DL (ref 65–99)
HOLD SPECIMEN: NORMAL
HOLD SPECIMEN: NORMAL
POTASSIUM BLD-SCNC: 4.6 MMOL/L (ref 3.5–5.2)
SODIUM BLD-SCNC: 137 MMOL/L (ref 136–145)
WHOLE BLOOD HOLD SPECIMEN: NORMAL
WHOLE BLOOD HOLD SPECIMEN: NORMAL

## 2018-06-17 PROCEDURE — 25010000002 DEXAMETHASONE PER 1 MG: Performed by: NURSE ANESTHETIST, CERTIFIED REGISTERED

## 2018-06-17 PROCEDURE — G0378 HOSPITAL OBSERVATION PER HR: HCPCS

## 2018-06-17 PROCEDURE — 25010000002 FENTANYL CITRATE (PF) 100 MCG/2ML SOLUTION: Performed by: NURSE ANESTHETIST, CERTIFIED REGISTERED

## 2018-06-17 PROCEDURE — 25010000002 PROPOFOL 10 MG/ML EMULSION: Performed by: NURSE ANESTHETIST, CERTIFIED REGISTERED

## 2018-06-17 PROCEDURE — 76000 FLUOROSCOPY <1 HR PHYS/QHP: CPT

## 2018-06-17 PROCEDURE — 96375 TX/PRO/DX INJ NEW DRUG ADDON: CPT

## 2018-06-17 PROCEDURE — 96361 HYDRATE IV INFUSION ADD-ON: CPT

## 2018-06-17 PROCEDURE — 96374 THER/PROPH/DIAG INJ IV PUSH: CPT

## 2018-06-17 PROCEDURE — 99285 EMERGENCY DEPT VISIT HI MDM: CPT

## 2018-06-17 PROCEDURE — 25010000002 MIDAZOLAM PER 1 MG: Performed by: ANESTHESIOLOGY

## 2018-06-17 PROCEDURE — C2617 STENT, NON-COR, TEM W/O DEL: HCPCS | Performed by: UROLOGY

## 2018-06-17 PROCEDURE — C1769 GUIDE WIRE: HCPCS | Performed by: UROLOGY

## 2018-06-17 PROCEDURE — 25010000003 CEFAZOLIN 1-4 GM/50ML-% SOLUTION: Performed by: UROLOGY

## 2018-06-17 PROCEDURE — 25010000002 ONDANSETRON PER 1 MG: Performed by: NURSE ANESTHETIST, CERTIFIED REGISTERED

## 2018-06-17 PROCEDURE — 80048 BASIC METABOLIC PNL TOTAL CA: CPT | Performed by: EMERGENCY MEDICINE

## 2018-06-17 PROCEDURE — 36415 COLL VENOUS BLD VENIPUNCTURE: CPT

## 2018-06-17 PROCEDURE — 25010000002 KETOROLAC TROMETHAMINE PER 15 MG: Performed by: EMERGENCY MEDICINE

## 2018-06-17 PROCEDURE — 93010 ELECTROCARDIOGRAM REPORT: CPT | Performed by: INTERNAL MEDICINE

## 2018-06-17 PROCEDURE — 93005 ELECTROCARDIOGRAM TRACING: CPT | Performed by: ANESTHESIOLOGY

## 2018-06-17 PROCEDURE — C1758 CATHETER, URETERAL: HCPCS | Performed by: UROLOGY

## 2018-06-17 PROCEDURE — 74018 RADEX ABDOMEN 1 VIEW: CPT

## 2018-06-17 PROCEDURE — 0 IOTHALAMATE 60 % SOLUTION: Performed by: UROLOGY

## 2018-06-17 DEVICE — URETERAL STENT
Type: IMPLANTABLE DEVICE | Status: FUNCTIONAL
Brand: CONTOUR™

## 2018-06-17 RX ORDER — HYDRALAZINE HYDROCHLORIDE 20 MG/ML
5 INJECTION INTRAMUSCULAR; INTRAVENOUS
Status: DISCONTINUED | OUTPATIENT
Start: 2018-06-17 | End: 2018-06-17 | Stop reason: HOSPADM

## 2018-06-17 RX ORDER — FENTANYL CITRATE 50 UG/ML
INJECTION, SOLUTION INTRAMUSCULAR; INTRAVENOUS
Status: COMPLETED
Start: 2018-06-17 | End: 2018-06-17

## 2018-06-17 RX ORDER — LABETALOL HYDROCHLORIDE 5 MG/ML
5 INJECTION, SOLUTION INTRAVENOUS
Status: DISCONTINUED | OUTPATIENT
Start: 2018-06-17 | End: 2018-06-17 | Stop reason: HOSPADM

## 2018-06-17 RX ORDER — HYDROCODONE BITARTRATE AND ACETAMINOPHEN 7.5; 325 MG/1; MG/1
1 TABLET ORAL ONCE AS NEEDED
Status: DISCONTINUED | OUTPATIENT
Start: 2018-06-17 | End: 2018-06-17 | Stop reason: HOSPADM

## 2018-06-17 RX ORDER — OXYCODONE AND ACETAMINOPHEN 7.5; 325 MG/1; MG/1
1 TABLET ORAL ONCE AS NEEDED
Status: DISCONTINUED | OUTPATIENT
Start: 2018-06-17 | End: 2018-06-17 | Stop reason: HOSPADM

## 2018-06-17 RX ORDER — LIDOCAINE HYDROCHLORIDE 20 MG/ML
INJECTION, SOLUTION INFILTRATION; PERINEURAL AS NEEDED
Status: DISCONTINUED | OUTPATIENT
Start: 2018-06-17 | End: 2018-06-17 | Stop reason: SURG

## 2018-06-17 RX ORDER — ONDANSETRON 2 MG/ML
4 INJECTION INTRAMUSCULAR; INTRAVENOUS ONCE AS NEEDED
Status: DISCONTINUED | OUTPATIENT
Start: 2018-06-17 | End: 2018-06-17 | Stop reason: HOSPADM

## 2018-06-17 RX ORDER — FAMOTIDINE 10 MG/ML
20 INJECTION, SOLUTION INTRAVENOUS ONCE
Status: DISCONTINUED | OUTPATIENT
Start: 2018-06-17 | End: 2018-06-17 | Stop reason: HOSPADM

## 2018-06-17 RX ORDER — FENTANYL CITRATE 50 UG/ML
INJECTION, SOLUTION INTRAMUSCULAR; INTRAVENOUS AS NEEDED
Status: DISCONTINUED | OUTPATIENT
Start: 2018-06-17 | End: 2018-06-17 | Stop reason: SURG

## 2018-06-17 RX ORDER — DEXAMETHASONE SODIUM PHOSPHATE 10 MG/ML
INJECTION INTRAMUSCULAR; INTRAVENOUS AS NEEDED
Status: DISCONTINUED | OUTPATIENT
Start: 2018-06-17 | End: 2018-06-17 | Stop reason: SURG

## 2018-06-17 RX ORDER — FENTANYL CITRATE 50 UG/ML
50 INJECTION, SOLUTION INTRAMUSCULAR; INTRAVENOUS
Status: DISCONTINUED | OUTPATIENT
Start: 2018-06-17 | End: 2018-06-17 | Stop reason: HOSPADM

## 2018-06-17 RX ORDER — SODIUM CHLORIDE 0.9 % (FLUSH) 0.9 %
10 SYRINGE (ML) INJECTION AS NEEDED
Status: DISCONTINUED | OUTPATIENT
Start: 2018-06-17 | End: 2018-06-17 | Stop reason: HOSPADM

## 2018-06-17 RX ORDER — PROMETHAZINE HYDROCHLORIDE 25 MG/ML
12.5 INJECTION, SOLUTION INTRAMUSCULAR; INTRAVENOUS ONCE AS NEEDED
Status: DISCONTINUED | OUTPATIENT
Start: 2018-06-17 | End: 2018-06-17 | Stop reason: HOSPADM

## 2018-06-17 RX ORDER — PROMETHAZINE HYDROCHLORIDE 25 MG/1
25 TABLET ORAL ONCE AS NEEDED
Status: DISCONTINUED | OUTPATIENT
Start: 2018-06-17 | End: 2018-06-17 | Stop reason: HOSPADM

## 2018-06-17 RX ORDER — NALOXONE HCL 0.4 MG/ML
0.2 VIAL (ML) INJECTION AS NEEDED
Status: DISCONTINUED | OUTPATIENT
Start: 2018-06-17 | End: 2018-06-17 | Stop reason: HOSPADM

## 2018-06-17 RX ORDER — PROPOFOL 10 MG/ML
VIAL (ML) INTRAVENOUS AS NEEDED
Status: DISCONTINUED | OUTPATIENT
Start: 2018-06-17 | End: 2018-06-17 | Stop reason: SURG

## 2018-06-17 RX ORDER — HYDROCODONE BITARTRATE AND ACETAMINOPHEN 7.5; 325 MG/1; MG/1
1 TABLET ORAL EVERY 4 HOURS PRN
Qty: 25 TABLET | Refills: 0 | Status: SHIPPED | OUTPATIENT
Start: 2018-06-17 | End: 2018-10-11

## 2018-06-17 RX ORDER — MIDAZOLAM HYDROCHLORIDE 1 MG/ML
1 INJECTION INTRAMUSCULAR; INTRAVENOUS
Status: DISCONTINUED | OUTPATIENT
Start: 2018-06-17 | End: 2018-06-17 | Stop reason: HOSPADM

## 2018-06-17 RX ORDER — SULFAMETHOXAZOLE AND TRIMETHOPRIM 800; 160 MG/1; MG/1
1 TABLET ORAL 2 TIMES DAILY
Qty: 6 TABLET | Refills: 0 | Status: SHIPPED | OUTPATIENT
Start: 2018-06-17 | End: 2018-10-11

## 2018-06-17 RX ORDER — MIDAZOLAM HYDROCHLORIDE 1 MG/ML
2 INJECTION INTRAMUSCULAR; INTRAVENOUS
Status: DISCONTINUED | OUTPATIENT
Start: 2018-06-17 | End: 2018-06-17 | Stop reason: HOSPADM

## 2018-06-17 RX ORDER — KETOROLAC TROMETHAMINE 15 MG/ML
15 INJECTION, SOLUTION INTRAMUSCULAR; INTRAVENOUS ONCE
Status: COMPLETED | OUTPATIENT
Start: 2018-06-17 | End: 2018-06-17

## 2018-06-17 RX ORDER — ONDANSETRON 2 MG/ML
INJECTION INTRAMUSCULAR; INTRAVENOUS AS NEEDED
Status: DISCONTINUED | OUTPATIENT
Start: 2018-06-17 | End: 2018-06-17 | Stop reason: SURG

## 2018-06-17 RX ORDER — SODIUM CHLORIDE 0.9 % (FLUSH) 0.9 %
1-10 SYRINGE (ML) INJECTION AS NEEDED
Status: DISCONTINUED | OUTPATIENT
Start: 2018-06-17 | End: 2018-06-17 | Stop reason: HOSPADM

## 2018-06-17 RX ORDER — FLUMAZENIL 0.1 MG/ML
0.2 INJECTION INTRAVENOUS AS NEEDED
Status: DISCONTINUED | OUTPATIENT
Start: 2018-06-17 | End: 2018-06-17 | Stop reason: HOSPADM

## 2018-06-17 RX ORDER — EPHEDRINE SULFATE 50 MG/ML
5 INJECTION, SOLUTION INTRAVENOUS ONCE AS NEEDED
Status: DISCONTINUED | OUTPATIENT
Start: 2018-06-17 | End: 2018-06-17 | Stop reason: HOSPADM

## 2018-06-17 RX ORDER — PROMETHAZINE HYDROCHLORIDE 25 MG/1
12.5 TABLET ORAL ONCE AS NEEDED
Status: DISCONTINUED | OUTPATIENT
Start: 2018-06-17 | End: 2018-06-17 | Stop reason: HOSPADM

## 2018-06-17 RX ORDER — PROMETHAZINE HYDROCHLORIDE 25 MG/1
25 SUPPOSITORY RECTAL ONCE AS NEEDED
Status: DISCONTINUED | OUTPATIENT
Start: 2018-06-17 | End: 2018-06-17 | Stop reason: HOSPADM

## 2018-06-17 RX ORDER — DIPHENHYDRAMINE HYDROCHLORIDE 50 MG/ML
12.5 INJECTION INTRAMUSCULAR; INTRAVENOUS
Status: DISCONTINUED | OUTPATIENT
Start: 2018-06-17 | End: 2018-06-17 | Stop reason: HOSPADM

## 2018-06-17 RX ORDER — HYDROMORPHONE HYDROCHLORIDE 1 MG/ML
0.5 INJECTION, SOLUTION INTRAMUSCULAR; INTRAVENOUS; SUBCUTANEOUS
Status: DISCONTINUED | OUTPATIENT
Start: 2018-06-17 | End: 2018-06-17 | Stop reason: HOSPADM

## 2018-06-17 RX ORDER — CEFAZOLIN SODIUM 1 G/50ML
1 INJECTION, SOLUTION INTRAVENOUS ONCE
Status: COMPLETED | OUTPATIENT
Start: 2018-06-17 | End: 2018-06-17

## 2018-06-17 RX ORDER — SODIUM CHLORIDE, SODIUM LACTATE, POTASSIUM CHLORIDE, CALCIUM CHLORIDE 600; 310; 30; 20 MG/100ML; MG/100ML; MG/100ML; MG/100ML
9 INJECTION, SOLUTION INTRAVENOUS CONTINUOUS
Status: DISCONTINUED | OUTPATIENT
Start: 2018-06-17 | End: 2018-06-17

## 2018-06-17 RX ORDER — LIDOCAINE HYDROCHLORIDE 10 MG/ML
0.5 INJECTION, SOLUTION EPIDURAL; INFILTRATION; INTRACAUDAL; PERINEURAL ONCE AS NEEDED
Status: DISCONTINUED | OUTPATIENT
Start: 2018-06-17 | End: 2018-06-17 | Stop reason: HOSPADM

## 2018-06-17 RX ADMIN — PROPOFOL 150 MG: 10 INJECTION, EMULSION INTRAVENOUS at 14:31

## 2018-06-17 RX ADMIN — DEXAMETHASONE SODIUM PHOSPHATE 8 MG: 10 INJECTION INTRAMUSCULAR; INTRAVENOUS at 14:35

## 2018-06-17 RX ADMIN — FENTANYL CITRATE 25 MCG: 50 INJECTION INTRAMUSCULAR; INTRAVENOUS at 14:41

## 2018-06-17 RX ADMIN — SODIUM CHLORIDE 1000 ML: 9 INJECTION, SOLUTION INTRAVENOUS at 08:30

## 2018-06-17 RX ADMIN — FENTANYL CITRATE 25 MCG: 50 INJECTION INTRAMUSCULAR; INTRAVENOUS at 14:31

## 2018-06-17 RX ADMIN — SODIUM CHLORIDE, POTASSIUM CHLORIDE, SODIUM LACTATE AND CALCIUM CHLORIDE 9 ML/HR: 600; 310; 30; 20 INJECTION, SOLUTION INTRAVENOUS at 13:05

## 2018-06-17 RX ADMIN — KETOROLAC TROMETHAMINE 15 MG: 15 INJECTION, SOLUTION INTRAMUSCULAR; INTRAVENOUS at 07:15

## 2018-06-17 RX ADMIN — LIDOCAINE HYDROCHLORIDE 125 MG: 40 INJECTION, SOLUTION RETROBULBAR; TOPICAL at 07:45

## 2018-06-17 RX ADMIN — MIDAZOLAM 1 MG: 1 INJECTION INTRAMUSCULAR; INTRAVENOUS at 14:21

## 2018-06-17 RX ADMIN — CEFAZOLIN SODIUM 1 G: 1 INJECTION, SOLUTION INTRAVENOUS at 14:33

## 2018-06-17 RX ADMIN — FENTANYL CITRATE 50 MCG: 50 INJECTION INTRAMUSCULAR; INTRAVENOUS at 14:38

## 2018-06-17 RX ADMIN — LIDOCAINE HYDROCHLORIDE 50 MG: 20 INJECTION, SOLUTION INFILTRATION; PERINEURAL at 14:31

## 2018-06-17 RX ADMIN — ONDANSETRON 4 MG: 2 INJECTION INTRAMUSCULAR; INTRAVENOUS at 14:49

## 2018-06-17 RX ADMIN — SODIUM CHLORIDE, POTASSIUM CHLORIDE, SODIUM LACTATE AND CALCIUM CHLORIDE: 600; 310; 30; 20 INJECTION, SOLUTION INTRAVENOUS at 14:25

## 2018-06-17 NOTE — ANESTHESIA PREPROCEDURE EVALUATION
Anesthesia Evaluation     Patient summary reviewed and Nursing notes reviewed   NPO Solid Status: > 8 hours  NPO Liquid Status: > 2 hours           Airway   Mallampati: II  TM distance: >3 FB  Neck ROM: full  Dental - normal exam     Pulmonary - normal exam    breath sounds clear to auscultation  (+) a smoker Former,   Cardiovascular - normal exam    Rhythm: irregular  Rate: normal    (+) hypertension, CAD (CAD WITH 50% LAD plaque), dysrhythmias (Irregular heart rhythm) Atrial Fib, hyperlipidemia,   (-) angina, orthopnea, PND, NEVAREZ      Neuro/Psych- negative ROS  GI/Hepatic/Renal/Endo    (+)   renal disease stones,     Musculoskeletal (-) negative ROS    Abdominal    Substance History - negative use     OB/GYN negative ob/gyn ROS         Other - negative ROS                     Anesthesia Plan    ASA 3     general     intravenous induction   Anesthetic plan and risks discussed with patient.

## 2018-06-17 NOTE — ANESTHESIA POSTPROCEDURE EVALUATION
Patient: Dennis Ryan    Procedure Summary     Date:  06/17/18 Room / Location:  [facility] OR 01 / [facility] MAIN OR    Anesthesia Start:  1426 Anesthesia Stop:  1457    Procedure:  CYSTOSCOPY, LEFT STENT PLACEMENT (Left ) Diagnosis:      Surgeon:  Jaydon Pereyra Jr., MD Provider:  David Hoang MD    Anesthesia Type:  general ASA Status:  3          Anesthesia Type: general  Last vitals  BP   152/79 (06/17/18 1550)   Temp   36.7 °C (98 °F) (06/17/18 1550)   Pulse   63 (06/17/18 1550)   Resp   12 (06/17/18 1550)     SpO2   97 % (06/17/18 1550)     Post Anesthesia Care and Evaluation    Patient location during evaluation: PACU  Patient participation: complete - patient participated  Level of consciousness: awake  Pain management: adequate  Airway patency: patent  Anesthetic complications: No anesthetic complications    Cardiovascular status: acceptable  Respiratory status: acceptable  Hydration status: acceptable

## 2018-06-17 NOTE — ANESTHESIA PROCEDURE NOTES
Airway  Urgency: elective    Airway not difficult    General Information and Staff    Patient location during procedure: OR  Anesthesiologist: MOHINDER SAAVEDRA  CRNA: STELLA SNELL    Indications and Patient Condition  Indications for airway management: airway protection    Preoxygenated: yes  Mask difficulty assessment: 0 - not attempted    Final Airway Details  Final airway type: supraglottic airway      Successful airway: unique  Size 5    Number of attempts at approach: 1    Additional Comments  Atraumatic. Dentition as preop.

## 2018-06-18 ENCOUNTER — TELEPHONE (OUTPATIENT)
Dept: SOCIAL WORK | Facility: HOSPITAL | Age: 73
End: 2018-06-18

## 2018-06-20 ENCOUNTER — TELEPHONE (OUTPATIENT)
Dept: CARDIOLOGY | Facility: CLINIC | Age: 73
End: 2018-06-20

## 2018-06-20 NOTE — TELEPHONE ENCOUNTER
Ivonne is needing another surgical clearance for a lithotripsy on this patient next week.  Her fax number is [phone].  Her phone number is [phone].    Thank you,  Mariza

## 2018-06-20 NOTE — TELEPHONE ENCOUNTER
The patient can come in and do a stress test this week as long as he is not having severe kidney stone pain or bloody urine. waqas

## 2018-06-22 NOTE — TELEPHONE ENCOUNTER
6/22/18  Pt returned call - left msg-  I spoke with him.... He received a stent on 6/17.  He cancelled the stress test that was scheduled here for 6/26 because he didn't think he should have them done with his having the kidney stone.  He is tentatively scheduled for lithostripsy on 6/28.  He is currently in no pain and no visible blood in urine, unless it would be trace.  I informed him we would call him back if the testing needs to be rescheduled in order to get him cleared for the lithotripsy. /bro

## 2018-06-23 PROBLEM — I65.29 STENOSIS OF CAROTID ARTERY: Status: ACTIVE | Noted: 2018-06-23

## 2018-08-01 DIAGNOSIS — I11.9 HYPERTENSION WITH HEART DISEASE: ICD-10-CM

## 2018-08-01 RX ORDER — ATORVASTATIN CALCIUM 20 MG/1
20 TABLET, FILM COATED ORAL DAILY
Qty: 30 TABLET | Refills: 11 | Status: SHIPPED | OUTPATIENT
Start: 2018-08-01 | End: 2019-08-15 | Stop reason: SDUPTHER

## 2018-10-11 ENCOUNTER — OFFICE VISIT (OUTPATIENT)
Dept: INTERNAL MEDICINE | Facility: CLINIC | Age: 73
End: 2018-10-11

## 2018-10-11 VITALS
HEIGHT: 69 IN | WEIGHT: 176 LBS | BODY MASS INDEX: 26.07 KG/M2 | OXYGEN SATURATION: 98 % | SYSTOLIC BLOOD PRESSURE: 131 MMHG | DIASTOLIC BLOOD PRESSURE: 76 MMHG | TEMPERATURE: 98.3 F | HEART RATE: 56 BPM

## 2018-10-11 DIAGNOSIS — Z00.00 HEALTH CARE MAINTENANCE: Primary | ICD-10-CM

## 2018-10-11 DIAGNOSIS — E78.2 MIXED HYPERLIPIDEMIA: ICD-10-CM

## 2018-10-11 DIAGNOSIS — I11.9 HYPERTENSION WITH HEART DISEASE: ICD-10-CM

## 2018-10-11 DIAGNOSIS — I25.10 CORONARY ARTERY DISEASE INVOLVING NATIVE CORONARY ARTERY OF NATIVE HEART WITHOUT ANGINA PECTORIS: ICD-10-CM

## 2018-10-11 LAB
ALBUMIN SERPL-MCNC: 4.6 G/DL (ref 3.5–5.2)
ALBUMIN/GLOB SERPL: 1.6 G/DL
ALP SERPL-CCNC: 80 U/L (ref 39–117)
ALT SERPL-CCNC: 17 U/L (ref 1–41)
APPEARANCE UR: CLEAR
AST SERPL-CCNC: 18 U/L (ref 1–40)
BACTERIA #/AREA URNS HPF: NORMAL /HPF
BASOPHILS # BLD AUTO: 0.04 10*3/MM3 (ref 0–0.2)
BASOPHILS NFR BLD AUTO: 0.6 % (ref 0–1.5)
BILIRUB SERPL-MCNC: 0.7 MG/DL (ref 0.1–1.2)
BILIRUB UR QL STRIP: NEGATIVE
BUN SERPL-MCNC: 17 MG/DL (ref 8–23)
BUN/CREAT SERPL: 18.5 (ref 7–25)
CALCIUM SERPL-MCNC: 9.6 MG/DL (ref 8.6–10.5)
CASTS URNS MICRO: NORMAL
CHLORIDE SERPL-SCNC: 99 MMOL/L (ref 98–107)
CHOLEST SERPL-MCNC: 138 MG/DL (ref 0–200)
CO2 SERPL-SCNC: 25.7 MMOL/L (ref 22–29)
COLOR UR: YELLOW
CREAT SERPL-MCNC: 0.92 MG/DL (ref 0.76–1.27)
EOSINOPHIL # BLD AUTO: 0.06 10*3/MM3 (ref 0–0.7)
EOSINOPHIL NFR BLD AUTO: 0.9 % (ref 0.3–6.2)
EPI CELLS #/AREA URNS HPF: NORMAL /HPF
ERYTHROCYTE [DISTWIDTH] IN BLOOD BY AUTOMATED COUNT: 14.1 % (ref 11.5–14.5)
GLOBULIN SER CALC-MCNC: 2.9 GM/DL
GLUCOSE SERPL-MCNC: 89 MG/DL (ref 65–99)
GLUCOSE UR QL: NEGATIVE
HCT VFR BLD AUTO: 48.8 % (ref 40.4–52.2)
HDLC SERPL-MCNC: 58 MG/DL (ref 40–60)
HGB BLD-MCNC: 15.6 G/DL (ref 13.7–17.6)
HGB UR QL STRIP: NEGATIVE
IMM GRANULOCYTES # BLD: 0.02 10*3/MM3 (ref 0–0.03)
IMM GRANULOCYTES NFR BLD: 0.3 % (ref 0–0.5)
KETONES UR QL STRIP: NEGATIVE
LDLC SERPL CALC-MCNC: 71 MG/DL (ref 0–100)
LDLC/HDLC SERPL: 1.23 {RATIO}
LEUKOCYTE ESTERASE UR QL STRIP: (no result)
LYMPHOCYTES # BLD AUTO: 1.8 10*3/MM3 (ref 0.9–4.8)
LYMPHOCYTES NFR BLD AUTO: 25.6 % (ref 19.6–45.3)
MCH RBC QN AUTO: 27.3 PG (ref 27–32.7)
MCHC RBC AUTO-ENTMCNC: 32 G/DL (ref 32.6–36.4)
MCV RBC AUTO: 85.5 FL (ref 79.8–96.2)
MONOCYTES # BLD AUTO: 0.63 10*3/MM3 (ref 0.2–1.2)
MONOCYTES NFR BLD AUTO: 9 % (ref 5–12)
NEUTROPHILS # BLD AUTO: 4.48 10*3/MM3 (ref 1.9–8.1)
NEUTROPHILS NFR BLD AUTO: 63.6 % (ref 42.7–76)
NITRITE UR QL STRIP: NEGATIVE
PH UR STRIP: 6 [PH] (ref 5–8)
PLATELET # BLD AUTO: 219 10*3/MM3 (ref 140–500)
POTASSIUM SERPL-SCNC: 4.5 MMOL/L (ref 3.5–5.2)
PROT SERPL-MCNC: 7.5 G/DL (ref 6–8.5)
PROT UR QL STRIP: NEGATIVE
PSA SERPL-MCNC: 2.39 NG/ML (ref 0–4)
RBC # BLD AUTO: 5.71 10*6/MM3 (ref 4.6–6)
RBC #/AREA URNS HPF: NORMAL /HPF
SODIUM SERPL-SCNC: 138 MMOL/L (ref 136–145)
SP GR UR: 1.01 (ref 1–1.03)
T4 FREE SERPL-MCNC: 1.35 NG/DL (ref 0.93–1.7)
TRIGL SERPL-MCNC: 44 MG/DL (ref 0–150)
TSH SERPL DL<=0.005 MIU/L-ACNC: 2.67 MIU/ML (ref 0.27–4.2)
UROBILINOGEN UR STRIP-MCNC: (no result) MG/DL
VLDLC SERPL CALC-MCNC: 8.8 MG/DL (ref 5–40)
WBC # BLD AUTO: 7.03 10*3/MM3 (ref 4.5–10.7)
WBC #/AREA URNS HPF: NORMAL /HPF

## 2018-10-11 PROCEDURE — 90662 IIV NO PRSV INCREASED AG IM: CPT | Performed by: INTERNAL MEDICINE

## 2018-10-11 PROCEDURE — 99213 OFFICE O/P EST LOW 20 MIN: CPT | Performed by: INTERNAL MEDICINE

## 2018-10-11 PROCEDURE — 90471 IMMUNIZATION ADMIN: CPT | Performed by: INTERNAL MEDICINE

## 2018-10-11 PROCEDURE — 99397 PER PM REEVAL EST PAT 65+ YR: CPT | Performed by: INTERNAL MEDICINE

## 2018-10-11 RX ORDER — ASPIRIN 81 MG/1
162 TABLET ORAL DAILY
COMMUNITY
End: 2022-12-30 | Stop reason: SDUPTHER

## 2018-10-28 NOTE — PROGRESS NOTES
Subjective   Dennis Ryan is a 73 y.o. male.   Is here today for complete physical exam along with follow for hypertension mixed hyper lipidemia CAD  History of Present Illness   Is here today for complete physical exam along with follow-up for hypertension which is stable on current medication mixed hyper lipidemia which has been stable on Lipitor and CAD which stable with no evidence of chest pain on current medication  The following portions of the patient's history were reviewed and updated as appropriate: allergies, current medications, past family history, past medical history, past social history, past surgical history and problem list.    Review of Systems   Respiratory: Negative for chest tightness and shortness of breath.    Cardiovascular: Negative for chest pain.   Musculoskeletal: Negative for myalgias.   Psychiatric/Behavioral: Negative for decreased concentration and dysphoric mood.   All other systems reviewed and are negative.      Objective   Physical Exam   Constitutional: He is oriented to person, place, and time. Vital signs are normal. He appears well-developed and well-nourished. He is active.   HENT:   Head: Normocephalic and atraumatic.   Right Ear: Hearing, tympanic membrane, external ear and ear canal normal.   Left Ear: Hearing, tympanic membrane, external ear and ear canal normal.   Nose: Nose normal.   Mouth/Throat: Uvula is midline, oropharynx is clear and moist and mucous membranes are normal.   Eyes: Pupils are equal, round, and reactive to light. Conjunctivae, EOM and lids are normal. Right eye exhibits no discharge. Left eye exhibits no discharge.   Neck: Trachea normal, normal range of motion, full passive range of motion without pain and phonation normal. Neck supple. Carotid bruit is not present. No edema present. No thyroid mass and no thyromegaly present.   Cardiovascular: Normal rate, regular rhythm, normal heart sounds, intact distal pulses and normal pulses.  Exam  reveals no gallop and no friction rub.    No murmur heard.  Pulmonary/Chest: Effort normal and breath sounds normal. No respiratory distress. He has no wheezes. He has no rales.   Abdominal: Soft. Normal appearance, normal aorta and bowel sounds are normal. He exhibits no distension, no abdominal bruit and no mass. There is no hepatosplenomegaly. There is no tenderness. There is no rebound, no guarding and no CVA tenderness. No hernia. Hernia confirmed negative in the right inguinal area and confirmed negative in the left inguinal area.   Musculoskeletal: Normal range of motion. He exhibits no edema or tenderness.     Vascular Status -  His right foot exhibits normal foot vasculature  and no edema. His left foot exhibits normal foot vasculature  and no edema.  Skin Integrity  -  His right foot skin is intact.His left foot skin is intact..  Lymphadenopathy:     He has no cervical adenopathy.     He has no axillary adenopathy.        Right: No inguinal and no supraclavicular adenopathy present.        Left: No inguinal and no supraclavicular adenopathy present.   Neurological: He is alert and oriented to person, place, and time. He has normal strength. No cranial nerve deficit or sensory deficit. He exhibits normal muscle tone. He displays a negative Romberg sign. Coordination normal.   Skin: Skin is warm, dry and intact. No cyanosis. Nails show no clubbing.   Psychiatric: He has a normal mood and affect. His speech is normal and behavior is normal. Judgment and thought content normal. Cognition and memory are normal.   Nursing note and vitals reviewed.      Assessment/Plan   Diagnoses and all orders for this visit:    Health care maintenance  -     Lipid Panel With LDL / HDL Ratio  -     CBC & Differential  -     Comprehensive Metabolic Panel  -     T4, Free  -     TSH  -     Urinalysis With Microscopic - Urine, Clean Catch  -     PSA DIAGNOSTIC    Hypertension with heart disease    Mixed hyperlipidemia    Coronary  artery disease involving native coronary artery of native heart without angina pectoris    Other orders  -     Fluzone High Dose =>65Years  -     Microscopic Examination        Healthcare maintenance fasting labs vaccines colonoscopies on a regular basis  Hypertension with heart disease stable on current medication no changes  Mixed hyper lipidemia has been stable on Lipitor and we will check lipid panel with liver enzymes  CAD stable on current medication with no evidence of any changes needed

## 2019-01-24 ENCOUNTER — OFFICE VISIT (OUTPATIENT)
Dept: INTERNAL MEDICINE | Facility: CLINIC | Age: 74
End: 2019-01-24

## 2019-01-24 VITALS
HEIGHT: 69 IN | SYSTOLIC BLOOD PRESSURE: 134 MMHG | RESPIRATION RATE: 16 BRPM | HEART RATE: 74 BPM | TEMPERATURE: 97.4 F | BODY MASS INDEX: 26.81 KG/M2 | DIASTOLIC BLOOD PRESSURE: 82 MMHG | OXYGEN SATURATION: 99 % | WEIGHT: 181 LBS

## 2019-01-24 DIAGNOSIS — M25.551 ACUTE RIGHT HIP PAIN: Primary | ICD-10-CM

## 2019-01-24 DIAGNOSIS — Z12.11 ENCOUNTER FOR SCREENING COLONOSCOPY: ICD-10-CM

## 2019-01-24 PROCEDURE — 99213 OFFICE O/P EST LOW 20 MIN: CPT | Performed by: INTERNAL MEDICINE

## 2019-01-25 ENCOUNTER — HOSPITAL ENCOUNTER (OUTPATIENT)
Dept: GENERAL RADIOLOGY | Facility: HOSPITAL | Age: 74
Discharge: HOME | End: 2019-01-25
Admitting: INTERNAL MEDICINE

## 2019-01-25 PROCEDURE — 73502 X-RAY EXAM HIP UNI 2-3 VIEWS: CPT

## 2019-02-07 NOTE — PROGRESS NOTES
Subjective   Dennis Ryan is a 73 y.o. male.   He is here today for acute right hip pain which is been going on for about a week now along with a desire to get a colonoscopy  History of Present Illness   He is here today for acute right hip pain which is been going on for about a week now along with need for colonoscopy  The following portions of the patient's history were reviewed and updated as appropriate: allergies, current medications, past family history, past medical history, past social history, past surgical history and problem list.    Review of Systems   Gastrointestinal: Negative for anal bleeding and blood in stool.   Musculoskeletal: Positive for arthralgias. Negative for gait problem.   All other systems reviewed and are negative.      Objective   Physical Exam   Constitutional: He is oriented to person, place, and time. He appears well-developed and well-nourished. He is cooperative.   HENT:   Head: Normocephalic and atraumatic.   Right Ear: Hearing, tympanic membrane, external ear and ear canal normal.   Left Ear: Hearing, tympanic membrane, external ear and ear canal normal.   Nose: Nose normal.   Mouth/Throat: Uvula is midline, oropharynx is clear and moist and mucous membranes are normal.   Eyes: Conjunctivae, EOM and lids are normal. Pupils are equal, round, and reactive to light.   Neck: Phonation normal. Neck supple. Carotid bruit is not present.   Cardiovascular: Normal rate, regular rhythm and normal heart sounds. Exam reveals no gallop and no friction rub.   No murmur heard.  Pulmonary/Chest: Effort normal and breath sounds normal. No respiratory distress.   Abdominal: Soft. Bowel sounds are normal. He exhibits no distension and no mass. There is no hepatosplenomegaly. There is no tenderness. There is no rebound and no guarding. No hernia.   Musculoskeletal: He exhibits no edema.        Right hip: He exhibits decreased range of motion and tenderness.   Neurological: He is alert and  oriented to person, place, and time. Coordination and gait normal.   Skin: Skin is warm and dry.   Psychiatric: He has a normal mood and affect. His speech is normal and behavior is normal. Judgment and thought content normal.   Nursing note and vitals reviewed.      Assessment/Plan   Diagnoses and all orders for this visit:    Acute right hip pain  -     XR Hip With or Without Pelvis 2 - 3 View Right    Encounter for screening colonoscopy  -     Ambulatory Referral For Screening Colonoscopy        Acute pain of right hip we will get x-ray of the hip and go from there and he will continue his over-the-counter medicines for now  Encounter for screening colonoscopy we will schedule

## 2019-02-12 ENCOUNTER — OFFICE VISIT (OUTPATIENT)
Dept: INTERNAL MEDICINE | Facility: CLINIC | Age: 74
End: 2019-02-12

## 2019-02-12 VITALS
WEIGHT: 179 LBS | DIASTOLIC BLOOD PRESSURE: 75 MMHG | SYSTOLIC BLOOD PRESSURE: 151 MMHG | BODY MASS INDEX: 26.51 KG/M2 | OXYGEN SATURATION: 98 % | TEMPERATURE: 97.6 F | HEIGHT: 69 IN | RESPIRATION RATE: 16 BRPM | HEART RATE: 64 BPM

## 2019-02-12 DIAGNOSIS — M54.50 LUMBAR BACK PAIN: Primary | ICD-10-CM

## 2019-02-12 PROCEDURE — 99213 OFFICE O/P EST LOW 20 MIN: CPT | Performed by: NURSE PRACTITIONER

## 2019-02-12 NOTE — PROGRESS NOTES
"Subjective   Dennis Ryan is a 73 y.o. male.   Chief Complaint   Patient presents with   • Results     Pt would like to discuss xray results, pt is requesting a note for time off work due to his results   Low back pain      Patient presents for evaluation of low back pain.  This is a 73-year-old male patient of Dr. Cr.  He presented to Dr. Cr on 1/24/2019 complaining of right hip pain times 1 week.  An x-ray of the hip and pelvis was performed which showed no acute process in the hip, but did show some degenerative lumbar vertebral changes.  The patient states that the pain is waxing and waning, but gradually worsening.  He is no longer having hip pain, but states that the low back pain is progressively constant.  He rates the pain today at a 3 out of 10, and describes it as \"extremely achy.\"  He has a job in which he is required to do continual heavy lifting, and he states that he is unable to perform his job duties due to the pain.  Lifting exacerbates it greatly.  He has been using alternating heat and ice, and states that this is mildly effective.  He has not been taking anti-inflammatories except when he is in extreme pain.  He states that he does have a history of a ruptured lumbar disc in 1986.  He reports no radiculopathy, denying numbness or tingling in any upper or lower extremity.  He denies fever, chills, shortness of breath, chest discomfort.  He denies any mechanism of injury or fall to the back.  He denies development of any other new issues today.         The following portions of the patient's history were reviewed and updated as appropriate: allergies, current medications, past family history, past medical history, past social history, past surgical history and problem list.    Review of Systems   Constitutional: Negative for activity change, chills, fatigue, fever, unexpected weight gain and unexpected weight loss.   HENT: Negative for congestion, hearing loss, postnasal drip, " "sinus pressure, sneezing, sore throat and tinnitus.    Eyes: Negative for photophobia, pain and visual disturbance.   Respiratory: Negative for cough, chest tightness, shortness of breath and wheezing.    Cardiovascular: Negative for chest pain, palpitations and leg swelling.   Gastrointestinal: Negative for abdominal distention, abdominal pain, constipation, diarrhea, nausea and vomiting.   Endocrine: Negative for polydipsia, polyphagia and polyuria.   Genitourinary: Negative for dysuria, frequency, hematuria and urgency.   Musculoskeletal: Positive for back pain.   Neurological: Negative for dizziness, weakness, numbness and headache.   All other systems reviewed and are negative.      Objective    /75 (BP Location: Left arm, Patient Position: Sitting, Cuff Size: Small Adult)   Pulse 64   Temp 97.6 °F (36.4 °C) (Oral)   Resp 16   Ht 175.3 cm (69\")   Wt 81.2 kg (179 lb)   SpO2 98%   BMI 26.43 kg/m²     Physical Exam   Constitutional: He is oriented to person, place, and time. He appears well-developed and well-nourished. No distress.   HENT:   Head: Normocephalic and atraumatic.   Right Ear: External ear normal.   Left Ear: External ear normal.   Nose: Nose normal.   Mouth/Throat: Oropharynx is clear and moist. No oropharyngeal exudate.   Eyes: Conjunctivae and EOM are normal. Pupils are equal, round, and reactive to light.   Neck: Normal range of motion. Neck supple.   Cardiovascular: Normal rate, regular rhythm, normal heart sounds and intact distal pulses. Exam reveals no gallop and no friction rub.   No murmur heard.  Pulmonary/Chest: Effort normal and breath sounds normal. No stridor. No respiratory distress. He has no wheezes. He has no rales. He exhibits no tenderness.   Lungs are CTA bilaterally   Musculoskeletal: Normal range of motion.        Lumbar back: He exhibits pain and spasm. He exhibits normal range of motion, no tenderness, no bony tenderness, no swelling, no edema, no deformity, no " laceration and normal pulse.   Neurological: He is alert and oriented to person, place, and time.   Skin: Skin is warm and dry. Capillary refill takes less than 2 seconds. He is not diaphoretic.   Psychiatric: He has a normal mood and affect. His behavior is normal. Judgment and thought content normal.   Nursing note and vitals reviewed.        Assessment/Plan   Dennis was seen today for results.    Diagnoses and all orders for this visit:    Lumbar back pain  -     MRI Lumbar Spine Without Contrast; Future  -     Ambulatory Referral to Physical Therapy      -Lumbar back pain: Reviewed x-ray of the pelvis and lumbar spine.  We will proceed with an MRI of the lumbar spine for further evaluation of low back pain, as some degenerative changes were noted.  Patient states that at this time he is unable to perform his job duties which include repetitive heavy lifting.  We will give him a work excuse for 1 week to help heal the back.  Also ordered a physical therapy consult for strengthening and additional relief of low back pain.  He may use anti-inflammatories and Tylenol sparingly, as needed.  Also continue to use heat.    -Follow-up if symptoms persist or worsen.  Follow-up routinely with PCP, Dr. Cr.

## 2019-02-15 ENCOUNTER — TELEPHONE (OUTPATIENT)
Dept: INTERNAL MEDICINE | Facility: CLINIC | Age: 74
End: 2019-02-15

## 2019-02-15 NOTE — TELEPHONE ENCOUNTER
Pt called and spoke with me about wanting to speak with Shy about not obtaining his MRI. I spoke with pt and he was also requesting another excused work note from our office starting on February 20 stating he could not work due to his back. Pt also stated he wanted FMLA paperwork completed by Shy for this. I advised pt that Shy stated in her office note that she would give him a week of work to help the healing of his back so that he could return to work. Pt stated that he did schedule PT to start in the middle of next week. I advised pt that we needed to get the MRI to see the extent of the degenerative changes in his back per the request of Shy.     Pt stated he did not feel like he needed the MRI due to everything was healing and getting better. I asked the pt why he was needing more time off and FMLA if he felt everything was getting better. Pt stated that he cannot lift at work right now and that he though between PT and resting he would need about 6 weeks off from work for the back pain. Pt still refused MRI. I advised pt I would send a note to Shy to give him a call and discuss this further.

## 2019-02-15 NOTE — TELEPHONE ENCOUNTER
Spoke with patient. He states that his back is feeling better, but he hasn't gotten the MRI yet, although now it is scheduled for 2/20/19. He was given one week off of work, and is requesting more time off. I explained to him that there is no medical justification at this time for additional time off of work, as his back is improving and his x-ray showed no acute fractures or issues. He is going to get the MRI and follow-up after that.

## 2019-02-15 NOTE — TELEPHONE ENCOUNTER
Pt called and said that the MRI is to expensive and does not feel like he needs to have this done. Pt would like a call from you.

## 2019-02-20 ENCOUNTER — HOSPITAL ENCOUNTER (OUTPATIENT)
Dept: MRI IMAGING | Facility: HOSPITAL | Age: 74
Discharge: HOME | End: 2019-02-20
Admitting: NURSE PRACTITIONER

## 2019-02-20 DIAGNOSIS — M54.50 LUMBAR BACK PAIN: ICD-10-CM

## 2019-02-20 PROCEDURE — 72148 MRI LUMBAR SPINE W/O DYE: CPT

## 2019-02-21 ENCOUNTER — HOSPITAL ENCOUNTER (OUTPATIENT)
Dept: PHYSICAL THERAPY | Facility: HOSPITAL | Age: 74
Setting detail: THERAPIES SERIES
Discharge: HOME | End: 2019-02-21

## 2019-02-21 DIAGNOSIS — R29.898 WEAKNESS OF RIGHT LOWER EXTREMITY: ICD-10-CM

## 2019-02-21 DIAGNOSIS — M25.60 DECREASED RANGE OF MOTION: ICD-10-CM

## 2019-02-21 DIAGNOSIS — M54.50 LUMBAR BACK PAIN: Primary | ICD-10-CM

## 2019-02-21 PROCEDURE — 97161 PT EVAL LOW COMPLEX 20 MIN: CPT

## 2019-02-21 PROCEDURE — 97110 THERAPEUTIC EXERCISES: CPT

## 2019-02-22 ENCOUNTER — DOCUMENTATION (OUTPATIENT)
Dept: INTERNAL MEDICINE | Facility: CLINIC | Age: 74
End: 2019-02-22

## 2019-02-22 ENCOUNTER — TELEPHONE (OUTPATIENT)
Dept: INTERNAL MEDICINE | Facility: CLINIC | Age: 74
End: 2019-02-22

## 2019-02-22 DIAGNOSIS — M51.36 BULGING OF LUMBAR INTERVERTEBRAL DISC: Primary | ICD-10-CM

## 2019-02-22 NOTE — THERAPY EVALUATION
Outpatient Physical Therapy Ortho Initial Evaluation  [facility]     Patient Name: Dennis Ryan  : 1945  MRN: [id number]  Today's Date: 2019      Visit Date: 2019    Patient Active Problem List   Diagnosis   • Coronary artery disease involving native coronary artery of native heart without angina pectoris   • Fatigue   • White coat hypertension   • Hyperlipidemia   • Hypertension with heart disease   • Left ventricular hypertrophy   • Palpitations   • Irregular heart rhythm   • Visit for suture removal   • Kidney stone on left side   • Stenosis of carotid artery   • Health care maintenance   • Acute right hip pain   • Encounter for screening colonoscopy        Past Medical History:   Diagnosis Date   • Cardiovascular risk factor     No diabetes.  No hypertension.  Positive for hyperlipidemia.  Positive for family history of coronary artery disease with father with CAD age 60s, mother with heart problem at age 84, brother and sister x2 with hypertension   • Coronary artery disease    • Heart palpitations    • Hyperlipidemia    • Hypertension    • Kidney stone    • Left ventricular hypertrophy    • Palpitations         Past Surgical History:   Procedure Laterality Date   • APPENDECTOMY     • CORONARY ANGIOPLASTY WITH STENT PLACEMENT     • CYSTOSCOPY W/ URETERAL STENT PLACEMENT Left 2018    Procedure: CYSTOSCOPY, LEFT STENT PLACEMENT;  Surgeon: Jaydon Pereyra Jr., MD;  Location: [facility];  Service: Urology   • OTHER SURGICAL HISTORY      groin rupture    • PROSTATE SURGERY         Visit Dx:     ICD-10-CM ICD-9-CM   1. Lumbar back pain M54.5 724.2   2. Weakness of right lower extremity R29.898 729.89   3. Decreased range of motion M25.60 719.50       Patient History     Row Name 19 1400             History    Chief Complaint  Pain  -JA      Type of Pain  Back pain  -JA      Date Current Problem(s) Began  -- May or   -      Brief Description of Current Complaint   Pain started last May or June, in R hip, assumed it was bursitis or tendonitis.  MRI showed disc deg lower R lumbar vertebra.  Pain comes and goes.   at [employer] and lifts 50# boxes 10-15 a day depending on how busy it is.  The hip pain retreated and now it's in the low back.  1986 had a ruptured disc on L that healed on its own over several months.  Is on leave from work since Feb 13, severe hip pain has been gone for about a week. Has been walking outside.  No prob w/stairs.  Standing, lifting, and walking increase pain.  States he doesn't think he bends or lifts correctly.  -JA      Patient/Caregiver Goals  Relieve pain  -JA      Hand Dominance  right-handed  -JA      Occupation/sports/leisure activities    -JA      What clinical tests have you had for this problem?  MRI;X-ray  -JA      Results of Clinical Tests  see MRI report for specific details regarding degenerative changes L4-5, and grade 1 spondylolisthesis L5-S1  -JA         Pain     Pain Location  Back  -JA      Pain at Present  3  -JA      Pain at Best  3  -JA      Pain at Worst  6  -JA      Pain Frequency  Constant/continuous  -JA      Pain Comments  worse in the morning;   -JA      Tolerance Time- Standing  30 min or less  -JA      Tolerance Time- Sitting  30 min or less  -JA      Tolerance Time- Walking  30 min or less  -JA      What position do you sleep in?  Right sidelying;Left sidelying  -         Fall Risk Assessment    Any falls in the past year:  Yes  -JA      Number of falls reported in the last 12 months  1  -JA      Factors that contributed to the fall:  Other (comment) fell out of bed landing on R side 3wks ago  -JA         Services    Prior Rehab/Home Health Experiences  No  -JA      Are you currently receiving Home Health services  No  -JA         Daily Activities    Primary Language  English  -JA      How does patient learn best?  Reading  -JA      Recommended Referrals  Physical Therapy  -JA      Pt Participated in  POC and Goals  Yes  -JA         Safety    Are you being hurt, hit, or frightened by anyone at home or in your life?  No  -JA      Are you being neglected by a caregiver  No  -JA        User Key  (r) = Recorded By, (t) = Taken By, (c) = Cosigned By    Initials Name Provider Type    Guerita Dowell, PT Physical Therapist          PT Ortho     Row Name 02/21/19 1400       Posture/Observations    Alignment Options  Forward head;Cervical lordosis;Thoracic kyphosis;Rounded shoulders;Scapular elevation;Scapular winging;Scoliosis;Lumbar lordosis;Iliac crests  -JA    Forward Head  Moderate  -JA    Cervical Lordosis  Decreased  -JA    Thoracic Kyphosis  Mild;Increased  -JA    Rounded Shoulders  Increased  -JA    Lumbar lordosis  Decreased  -JA    Posture/Observations Comments  guarded movement  -JA       Quarter Clearing    Quarter Clearing  Lower Quarter Clearing  -JA       Myotomal Screen- Lower Quarter Clearing    Hip flexion (L2)  Right:;4- (Good -);Left:;4+ (Good +)  -JA    Knee extension (L3)  Right:;4- (Good -);Left:;4+ (Good +)  -JA    Ankle DF (L4)  Right:;4- (Good -);Left:;4+ (Good +)  -JA    Ankle PF (S1)  Right:;4- (Good -);Left:;4+ (Good +)  -JA    Knee flexion (S2)  Right:;4- (Good -);Left:;4+ (Good +)  -JA       Lumbar ROM Screen- Lower Quarter Clearing    Lumbar Flexion  Impaired 50% and very guarded  -JA    Lumbar Extension  Impaired 25% no pain  -JA    Lumbar Lateral Flexion  Impaired <25% manny, guarded, ipsilateral pain  -JA    Lumbar Rotation  Impaired 30% manny   -JA       SI/Hip Screen- Lower Quarter Clearing    Chago's/Jb's test  Bilateral:;Positive +for pain and tightness manny hips  -JA       Special Tests/Palpation    Special Tests/Palpation  Lumbar/SI  -JA       Lumbosacral Accessory Motions    Lumbosacral Accessory Motions Tested?  -- hypomobile throughout, minimal pain with PA/UPA  -JA       Lumbosacral Palpation    Lumbosacral Palpation?  -- R PSIS region, minimal point tenderness  (activity provokes)  -KARLA      User Key  (r) = Recorded By, (t) = Taken By, (c) = Cosigned By    Initials Name Provider Type    Guerita Dowell, PT Physical Therapist                      Therapy Education  Education Details: Discussed posture and body mechanics--pt reports he doesn't bend/lift correctly.  Issued [employer] Managing Back Pain Book--will need to discuss and practice body mechanics throughout his therapy sessions.  Recommended heat and ice.  Given: HEP, Symptoms/condition management, Pain management, Posture/body mechanics  Program: New  How Provided: Verbal, Demonstration, Written  Provided to: Patient  Level of Understanding: Teach back education performed     PT OP Goals     Row Name 02/21/19 1400          PT Short Term Goals    STG Date to Achieve  03/07/19  -KARLA     STG 1  Patient will be independent and compliant with initial HEP   -KARLA     STG 1 Progress  New  -KARLA     STG 2  Patient will demonstrate correct posture in sitting and standing to decrease strain/pain on spine.  -KARLA     STG 2 Progress  New  -KARLA     STG 3  Pt will be able to move through 50% of full trunk ROM.  -KARLA     STG 3 Progress  New  -KARLA        Long Term Goals    LTG Date to Achieve  03/21/19  -KARLA     LTG 1  Pt will be independent with advanced HEP for spinal stabilization and LE/core strengthening.  -KARLA     LTG 1 Progress  New  -KARLA     LTG 2  Pt will demonstrate correct body mechanics with bending, reaching, and lifting ADL’s.  -KARLA     LTG 2 Progress  New  -KARLA     LTG 3  Pt will be able to move through % of full trunk ROM without increased pain >1/10.  -KARLA     LTG 3 Progress  New  -KARLA     LTG 4  Pt will score 30% or less on Oswestry Disability Index indicating decrease in perceived functional disability.  -KARLA     LTG 4 Progress  New  -KARLA     LTG 5  Patient will make appropriate changes to work and home environment to the extent possible to reduce strain on spine.   -KARLA     LTG 5 Progress  New  -KARLA        Time  Calculation    PT Goal Re-Cert Due Date  05/21/19  -KARLA       User Key  (r) = Recorded By, (t) = Taken By, (c) = Cosigned By    Initials Name Provider Type    Guerita Dowell, PT Physical Therapist          PT Assessment/Plan     Row Name 02/21/19 1400          PT Assessment    Functional Limitations  Impaired gait;Limitation in home management;Limitations in community activities;Limitations in functional capacity and performance  -KARLA     Impairments  Endurance;Gait;Muscle strength;Pain;Poor body mechanics;Posture;Range of motion  -KARLA     Assessment Comments  Dennis Ryan is a 73 y.o. male referred to outpatient physical therapy for evaluation and treatment of low back pain.  Patient presents with c/o R-sided LBP that ranges from 3-6 out of 10; he demonstrates decreased trunk ROM, weakness in R LE and core, poor posture and body mechanics.  His Oswestry score is 40%, 0=no perceived functional disability.  Signs and symptoms are consistent with referring diagnosis.  This condition is evolving. Pertinent comorbidities and personal factors that may affect progress include, but are not limited to, HTN.  Recommend skilled PT to address functional deficits. Thank you for this referral.  -KARLA     Please refer to paper survey for additional self-reported information  Yes  -KARLA     Rehab Potential  Good  -KARLA     Patient/caregiver participated in establishment of treatment plan and goals  Yes  -KARLA     Patient would benefit from skilled therapy intervention  Yes  -KARLA        PT Plan    PT Frequency  2x/week  -KARLA     Predicted Duration of Therapy Intervention (Therapy Eval)  4 weeks  -KARLA     Planned CPT's?  PT EVAL LOW COMPLEXITY: 75735;PT THER PROC EA 15 MIN: 31445;PT THER ACT EA 15 MIN: 57221;PT MANUAL THERAPY EA 15 MIN: 89239;PT NEUROMUSC RE-EDUCATION EA 15 MIN: 33096;PT GAIT TRAINING EA 15 MIN: 31167;PT SELF CARE/HOME MGMT/TRAIN EA 15: 92770;PT HOT OR COLD PACK TREAT MCARE;PT ELECTRICAL STIM UNATTEND: ;PT  ULTRASOUND EA 15 MIN: 48902;PT TRACTION LUMBAR: 94667  -JA     PT Plan Comments  review HEP, add LTR and piriformis stretch; educate for standing posture and practice bending body mechanics; may add MH with stretching, consider L-tx  -JA       User Key  (r) = Recorded By, (t) = Taken By, (c) = Cosigned By    Initials Name Provider Type    Guerita Dowell, PT Physical Therapist            Exercises     Row Name 02/21/19 1400             Subjective Comments    Subjective Comments  initial visit  -JA         Subjective Pain    Able to rate subjective pain?  yes  -JA      Pre-Treatment Pain Level  3  -JA      Post-Treatment Pain Level  3  -JA         Total Minutes    95703 - PT Therapeutic Exercise Minutes  25  -JA         Exercise 1    Exercise Name 1  Educated for posture and importance of body mechanics.  Begin with Nustep next visit.  -JA         Exercise 2    Exercise Name 2  prone press up  -JA      Reps 2  3  -JA      Time 2  15sec  -JA         Exercise 3    Exercise Name 3  glute sets  -JA      Reps 3  10  -JA      Time 3  3 sec  -JA         Exercise 4    Exercise Name 4  SKC  -JA      Reps 4  3  -JA      Time 4  15sec  -JA         Exercise 5    Exercise Name 5  TA in HL w/PPT  -JA      Reps 5  10  -JA      Time 5  3 sec  -JA         Exercise 6    Exercise Name 6  90/90 decompression position  -JA      Time 6  1 min  -JA         Exercise 7    Exercise Name 7  next visit add piriformis stretch  -JA         Exercise 8    Exercise Name 8  next visit add LTR  -JA        User Key  (r) = Recorded By, (t) = Taken By, (c) = Cosigned By    Initials Name Provider Type    Guerita Dowell, PT Physical Therapist                                  Time Calculation:     Therapy Suggested Charges     Code   Minutes Charges    56775 (CPT®) Hc Pt Neuromusc Re Education Ea 15 Min      83588 (CPT®) Hc Pt Ther Proc Ea 15 Min 25 2    38128 (CPT®) Hc Gait Training Ea 15 Min      27975 (CPT®) Hc Pt Therapeutic Act Ea 15 Min       00207 (CPT®) Hc Pt Manual Therapy Ea 15 Min      08045 (CPT®) Hc Pt Ther Massage- Per 15 Min      32821 (CPT®) Hc Pt Iontophoresis Ea 15 Min      82299 (CPT®) Hc Pt Elec Stim Ea-Per 15 Min      06495 (CPT®) Hc Pt Ultrasound Ea 15 Min      11888 (CPT®) Hc Pt Self Care/Mgmt/Train Ea 15 Min      46518 (CPT®) Hc Pt Prosthetic (S) Train Initial Encounter, Each 15 Min      11701 (CPT®) Hc Orthotic(S) Mgmt/Train Initial Encounter, Each 15min      64143 (CPT®) Hc Pt Aquatic Therapy Ea 15 Min      78940 (CPT®) Hc Pt Orthotic(S)/Prosthetic(S) Encounter, Each 15 Min       (CPT®) Hc Pt Electrical Stim Unattended      Total  25 2          Start Time: 1400  Stop Time: 1445  Time Calculation (min): 45 min     Therapy Charges for Today     Code Description Service Date Service Provider Modifiers Qty    23467739159 HC PT THER PROC EA 15 MIN 2/21/2019 Guerita Gaming, PT GP 2    41161921461 HC PT EVAL LOW COMPLEXITY 1 2/21/2019 Guerita Gaming, PT GP 1                    Guerita Gaming, PT  2/21/2019

## 2019-02-22 NOTE — PROGRESS NOTES
Spoke with patient on the phone this afternoon.  Discussed his MRI findings with him including canal stenosis at L3-L4, disc bulging at L2-3, and disc protrusion at L4-5.  He has been seeing physical therapy and had his first session yesterday.  He states that a schedule physical therapy has been sent to our office, and right now is projected to be through March 20.  I informed him that I am entering a referral for neurosurgery to evaluate his MRI and the protruding discs for further recommendations and management.  I have filled out the [facility] paperwork through March 20, which is when his physical therapy is set to end.  He will follow-up as needed.

## 2019-02-26 ENCOUNTER — HOSPITAL ENCOUNTER (OUTPATIENT)
Dept: PHYSICAL THERAPY | Facility: HOSPITAL | Age: 74
Setting detail: THERAPIES SERIES
Discharge: HOME | End: 2019-02-26

## 2019-02-26 DIAGNOSIS — M25.60 DECREASED RANGE OF MOTION: ICD-10-CM

## 2019-02-26 DIAGNOSIS — M54.50 LUMBAR BACK PAIN: Primary | ICD-10-CM

## 2019-02-26 DIAGNOSIS — R29.898 WEAKNESS OF RIGHT LOWER EXTREMITY: ICD-10-CM

## 2019-02-26 PROCEDURE — 97110 THERAPEUTIC EXERCISES: CPT | Performed by: PHYSICAL THERAPIST

## 2019-02-26 PROCEDURE — 97530 THERAPEUTIC ACTIVITIES: CPT | Performed by: PHYSICAL THERAPIST

## 2019-02-26 NOTE — THERAPY TREATMENT NOTE
Outpatient Physical Therapy Ortho Treatment Note  [facility]     Patient Name: Dennis yRan  : 1945  MRN: [id number]  Today's Date: 2019      Visit Date: 2019    Visit Dx:    ICD-10-CM ICD-9-CM   1. Lumbar back pain M54.5 724.2   2. Weakness of right lower extremity R29.898 729.89   3. Decreased range of motion M25.60 719.50       Patient Active Problem List   Diagnosis   • Coronary artery disease involving native coronary artery of native heart without angina pectoris   • Fatigue   • White coat hypertension   • Hyperlipidemia   • Hypertension with heart disease   • Left ventricular hypertrophy   • Palpitations   • Irregular heart rhythm   • Visit for suture removal   • Kidney stone on left side   • Stenosis of carotid artery   • Health care maintenance   • Acute right hip pain   • Encounter for screening colonoscopy        Past Medical History:   Diagnosis Date   • Cardiovascular risk factor     No diabetes.  No hypertension.  Positive for hyperlipidemia.  Positive for family history of coronary artery disease with father with CAD age 60s, mother with heart problem at age 84, brother and sister x2 with hypertension   • Coronary artery disease    • Heart palpitations    • Hyperlipidemia    • Hypertension    • Kidney stone    • Left ventricular hypertrophy    • Palpitations         Past Surgical History:   Procedure Laterality Date   • APPENDECTOMY     • CORONARY ANGIOPLASTY WITH STENT PLACEMENT     • CYSTOSCOPY W/ URETERAL STENT PLACEMENT Left 2018    Procedure: CYSTOSCOPY, LEFT STENT PLACEMENT;  Surgeon: Jaydon Pereyra Jr., MD;  Location: [facility];  Service: Urology   • OTHER SURGICAL HISTORY      groin rupture    • PROSTATE SURGERY                         PT Assessment/Plan     Row Name 19 1800          PT Assessment    Assessment Comments  Patient reports reduced pain since he hasn't been working - states he might end up retiring.  He is scheduled to see a  neurosurgeon on 3/6/19.  Continued with previous ther ex, added some new ex, and educated on proper bending and lifting techniques to reduce strain on spine.    -RA        PT Plan    PT Plan Comments  Continue skilled therapy for mobility, core activation, and education on posture, body mechanics with bending, lifting activities.  Advance to core activation with funtional positions and activity as able.    -RA       User Key  (r) = Recorded By, (t) = Taken By, (c) = Cosigned By    Initials Name Provider Type    Kayleigh De León, PT Physical Therapist          Modalities     Row Name 02/26/19 1600             Moist Heat    Patient denies application of MH  Yes  -RA        User Key  (r) = Recorded By, (t) = Taken By, (c) = Cosigned By    Initials Name Provider Type    Kayleigh De León, PT Physical Therapist          Exercises     Row Name 02/26/19 1600             Subjective Comments    Subjective Comments  Doing good today, not having any back pain, probably because I haven't been working and lifting boxes   -RA         Subjective Pain    Able to rate subjective pain?  yes  -RA      Pre-Treatment Pain Level  0  -RA      Post-Treatment Pain Level  0  -RA         Total Minutes    13369 - PT Therapeutic Exercise Minutes  35  -RA      99652 - PT Therapeutic Activity Minutes  10  -RA         Exercise 1    Exercise Name 1  NuStep UE/LE L 4  -RA      Time 1  5 min  -RA         Exercise 2    Exercise Name 2  prone press up  -RA      Cueing 2  Demo  -RA      Reps 2  3  -RA      Time 2  15sec  -RA         Exercise 3    Exercise Name 3  glute sets  -RA      Reps 3  10  -RA      Time 3  3 sec  -RA         Exercise 4    Exercise Name 4  SKC  -RA      Cueing 4  Demo  -RA      Reps 4  3  -RA      Time 4  15sec  -RA         Exercise 5    Exercise Name 5  TA in HL w/PPT  -RA      Cueing 5  Demo  -RA      Reps 5  10  -RA      Time 5  3 sec  -RA         Exercise 6    Exercise Name 6  90/90 decompression position  -RA      Time 6   10 min  -RA         Exercise 7    Exercise Name 7  HL piriformis/hip stretch ER / IR bias  -RA      Cueing 7  Demo;Verbal  -RA      Reps 7  3  -RA      Time 7  20 sec ea  -RA         Exercise 8    Exercise Name 8  LTR  -RA      Cueing 8  Demo;Verbal  -RA      Time 8  5 sec  -RA         Exercise 9    Exercise Name 9  standing TA activation, back at wall   -RA      Cueing 9  Verbal;Demo  -RA      Reps 9  5  -RA      Time 9  3 sec  -RA      Additional Comments  challenging for patient   -RA         Exercise 10    Exercise Name 10  Had patient work on posture, core activation, and proper body mechanics with lifting 10# (in orange crate) from low mat table to adjacent mat table at highest elevation and vice versa   -RA      Cueing 10  Demo;Verbal  -RA      Reps 10  5  -RA        User Key  (r) = Recorded By, (t) = Taken By, (c) = Cosigned By    Initials Name Provider Type    Kayleigh De León, PT Physical Therapist                             Therapy Education  Education Details: Discussed proper bending, lifting techniques with patient.    Given: Symptoms/condition management, Posture/body mechanics  Program: New, Reinforced, Progressed  How Provided: Verbal, Demonstration  Provided to: Patient  Level of Understanding: Teach back education performed, Verbalized              Time Calculation:   Start Time: 1708  Stop Time: 1757  Time Calculation (min): 49 min  Therapy Suggested Charges     Code   Minutes Charges    60823 (CPT®) Hc Pt Neuromusc Re Education Ea 15 Min      62041 (CPT®) Hc Pt Ther Proc Ea 15 Min 35 2    78120 (CPT®) Hc Gait Training Ea 15 Min      01657 (CPT®) Hc Pt Therapeutic Act Ea 15 Min 10 1    26575 (CPT®) Hc Pt Manual Therapy Ea 15 Min      48427 (CPT®) Hc Pt Ther Massage- Per 15 Min      72987 (CPT®) Hc Pt Iontophoresis Ea 15 Min      29302 (CPT®) Hc Pt Elec Stim Ea-Per 15 Min      96172 (CPT®) Hc Pt Ultrasound Ea 15 Min      21956 (CPT®) Hc Pt Self Care/Mgmt/Train Ea 15 Min      44599 (CPT®) Hc Pt  Prosthetic (S) Train Initial Encounter, Each 15 Min      59911 (CPT®) Hc Orthotic(S) Mgmt/Train Initial Encounter, Each 15min      03758 (CPT®) Hc Pt Aquatic Therapy Ea 15 Min      08772 (CPT®) Hc Pt Orthotic(S)/Prosthetic(S) Encounter, Each 15 Min       (CPT®) Hc Pt Electrical Stim Unattended      Total  45 3        Therapy Charges for Today     Code Description Service Date Service Provider Modifiers Qty    42174780988 HC PT THER PROC EA 15 MIN 2/26/2019 Kayleigh Santiago, PT GP 2    84819318879 HC PT THERAPEUTIC ACT EA 15 MIN 2/26/2019 Kayleigh Santiago, PT GP 1                    Kayleigh Santiago, PT  2/26/2019

## 2019-02-28 ENCOUNTER — TELEPHONE (OUTPATIENT)
Dept: INTERNAL MEDICINE | Facility: CLINIC | Age: 74
End: 2019-02-28

## 2019-02-28 NOTE — TELEPHONE ENCOUNTER
Pt called and requested FMLA form be faxed over to [phone].    This was completed on 2/28/19 @ 12:14pm.

## 2019-03-04 ENCOUNTER — HOSPITAL ENCOUNTER (OUTPATIENT)
Dept: PHYSICAL THERAPY | Facility: HOSPITAL | Age: 74
Setting detail: THERAPIES SERIES
Discharge: HOME | End: 2019-03-04

## 2019-03-04 DIAGNOSIS — M54.50 LUMBAR BACK PAIN: Primary | ICD-10-CM

## 2019-03-04 DIAGNOSIS — R29.898 WEAKNESS OF RIGHT LOWER EXTREMITY: ICD-10-CM

## 2019-03-04 DIAGNOSIS — M25.60 DECREASED RANGE OF MOTION: ICD-10-CM

## 2019-03-04 PROCEDURE — 97110 THERAPEUTIC EXERCISES: CPT | Performed by: PHYSICAL THERAPIST

## 2019-03-04 NOTE — THERAPY TREATMENT NOTE
Outpatient Physical Therapy Ortho Treatment Note  [facility]     Patient Name: Dennis Ryan  : 1945  MRN: [id number]  Today's Date: 3/4/2019      Visit Date: 2019    Visit Dx:    ICD-10-CM ICD-9-CM   1. Lumbar back pain M54.5 724.2   2. Weakness of right lower extremity R29.898 729.89   3. Decreased range of motion M25.60 719.50       Patient Active Problem List   Diagnosis   • Coronary artery disease involving native coronary artery of native heart without angina pectoris   • Fatigue   • White coat hypertension   • Hyperlipidemia   • Hypertension with heart disease   • Left ventricular hypertrophy   • Palpitations   • Irregular heart rhythm   • Visit for suture removal   • Kidney stone on left side   • Stenosis of carotid artery   • Health care maintenance   • Acute right hip pain   • Encounter for screening colonoscopy        Past Medical History:   Diagnosis Date   • Cardiovascular risk factor     No diabetes.  No hypertension.  Positive for hyperlipidemia.  Positive for family history of coronary artery disease with father with CAD age 60s, mother with heart problem at age 84, brother and sister x2 with hypertension   • Coronary artery disease    • Heart palpitations    • Hyperlipidemia    • Hypertension    • Kidney stone    • Left ventricular hypertrophy    • Palpitations         Past Surgical History:   Procedure Laterality Date   • APPENDECTOMY     • CORONARY ANGIOPLASTY WITH STENT PLACEMENT     • CYSTOSCOPY W/ URETERAL STENT PLACEMENT Left 2018    Procedure: CYSTOSCOPY, LEFT STENT PLACEMENT;  Surgeon: Jaydon Pereyra Jr., MD;  Location: [facility];  Service: Urology   • OTHER SURGICAL HISTORY      groin rupture    • PROSTATE SURGERY                         PT Assessment/Plan     Row Name 19 1411          PT Assessment    Assessment Comments  Patient continues to report no pain since he is off work and hasn't been lifting boxes.  Continued with education and ther ex as  previous.  Added some new ex for core/lumbar strength/stabilization.  Patient did require some cuing for posture, core activation, and correct form/technique with ther ex. especially new standing ex.  -RA        PT Plan    PT Plan Comments  Patient sees neurosurgeon 3/6/19.  Continue with skilled therapy for mobility, core activation, and education on posture/body mechanics with daily function including bending/lifting activities.  Advance core activation to more funtional positions and activities as able  -RA       User Key  (r) = Recorded By, (t) = Taken By, (c) = Cosigned By    Initials Name Provider Type    Kayleigh De León, PT Physical Therapist              Exercises     Row Name 03/04/19 1300             Subjective Comments    Subjective Comments  Not having any pain in my back (still not working).  No issue with ex last session.  See neurosurgeon on Wednedsay.   I have appt. to see neurosurgeon on Wednesday.  -RA         Subjective Pain    Able to rate subjective pain?  yes  -RA      Pre-Treatment Pain Level  0  -RA      Post-Treatment Pain Level  0  -RA         Total Minutes    36205 - PT Therapeutic Exercise Minutes  45  -RA         Exercise 1    Exercise Name 1  NuStep UE/LE L 4  -RA      Time 1  5 min  -RA         Exercise 2    Exercise Name 2  prone press up  -RA      Cueing 2  Demo  -RA      Reps 2  3  -RA      Time 2  15sec  -RA         Exercise 3    Exercise Name 3  glute sets  -RA      Reps 3  15  -RA      Time 3  3 sec  -RA         Exercise 4    Exercise Name 4  SKC  -RA      Cueing 4  Demo  -RA      Reps 4  3  -RA      Time 4  15sec  -RA         Exercise 5    Exercise Name 5  TA in HL w/PPT  -RA      Cueing 5  Demo  -RA      Reps 5  15  -RA      Time 5  3 sec  -RA         Exercise 6    Exercise Name 6  90/90 decompression position  -RA      Time 6  10 min  -RA      Additional Comments  end of session   -RA         Exercise 7    Exercise Name 7  HL piriformis/hip stretch ER / IR bias  -RA       Cueing 7  Demo;Verbal  -RA      Reps 7  3  -RA      Time 7  20 sec ea  -RA         Exercise 8    Exercise Name 8  LTR  -RA      Cueing 8  Demo;Verbal  -RA      Reps 8  10  -RA      Time 8  5 sec  -RA         Exercise 9    Exercise Name 9  standing TA activation, back at wall   -RA      Cueing 9  Verbal;Demo  -RA      Reps 9  10  -RA      Time 9  3 sec  -RA         Exercise 10    Exercise Name 10  TB scap ret / shoulder extension w/ TA  -RA      Cueing 10  Demo;Verbal  -RA      Reps 10  10 ea  -RA      Additional Comments  red TB   -RA         Exercise 11    Exercise Name 11  HL hip add w/ TA using ball   -RA      Cueing 11  Verbal;Demo  -RA      Reps 11  10  -RA      Time 11  5 sec  -RA         Exercise 12    Exercise Name 12  TB HL hip abd w/ TA using green band  -RA      Cueing 12  Verbal;Demo  -RA      Reps 12  10  -RA      Time 12  2-3 sec  -RA         Exercise 13    Exercise Name 13  --  -RA        User Key  (r) = Recorded By, (t) = Taken By, (c) = Cosigned By    Initials Name Provider Type    Kayleigh De León, PT Physical Therapist                             Therapy Education  Education Details: Updated HEP    Given: Symptoms/condition management, Posture/body mechanics, HEP  Program: Reinforced, Progressed, New  How Provided: Verbal, Demonstration, Written  Provided to: Patient  Level of Understanding: Teach back education performed, Verbalized              Time Calculation:   Start Time: 1400  Stop Time: 1455  Time Calculation (min): 55 min  Therapy Suggested Charges     Code   Minutes Charges    84079 (CPT®) Hc Pt Neuromusc Re Education Ea 15 Min      43847 (CPT®) Hc Pt Ther Proc Ea 15 Min 45 3    05874 (CPT®) Hc Gait Training Ea 15 Min      77527 (CPT®) Hc Pt Therapeutic Act Ea 15 Min      50887 (CPT®) Hc Pt Manual Therapy Ea 15 Min      47683 (CPT®) Hc Pt Ther Massage- Per 15 Min      59561 (CPT®) Hc Pt Iontophoresis Ea 15 Min      04318 (CPT®) Hc Pt Elec Stim Ea-Per 15 Min      67185 (CPT®) Hc Pt  Ultrasound Ea 15 Min      30384 (CPT®) Hc Pt Self Care/Mgmt/Train Ea 15 Min      77639 (CPT®) Hc Pt Prosthetic (S) Train Initial Encounter, Each 15 Min      90389 (CPT®) Hc Orthotic(S) Mgmt/Train Initial Encounter, Each 15min      57121 (CPT®) Hc Pt Aquatic Therapy Ea 15 Min      76048 (CPT®) Hc Pt Orthotic(S)/Prosthetic(S) Encounter, Each 15 Min       (CPT®) Hc Pt Electrical Stim Unattended      Total  45 3        Therapy Charges for Today     Code Description Service Date Service Provider Modifiers Qty    91817850485 HC PT THER PROC EA 15 MIN 3/4/2019 Kayleigh Santiago, PT GP 3                    Kayleigh Santiago, PT  3/4/2019

## 2019-03-06 ENCOUNTER — OFFICE VISIT (OUTPATIENT)
Dept: NEUROSURGERY | Facility: CLINIC | Age: 74
End: 2019-03-06

## 2019-03-06 VITALS
WEIGHT: 180 LBS | DIASTOLIC BLOOD PRESSURE: 84 MMHG | HEART RATE: 68 BPM | RESPIRATION RATE: 16 BRPM | SYSTOLIC BLOOD PRESSURE: 124 MMHG | BODY MASS INDEX: 26.66 KG/M2 | HEIGHT: 69 IN

## 2019-03-06 DIAGNOSIS — M25.551 ACUTE RIGHT HIP PAIN: Primary | ICD-10-CM

## 2019-03-06 PROCEDURE — 99243 OFF/OP CNSLTJ NEW/EST LOW 30: CPT | Performed by: NEUROLOGICAL SURGERY

## 2019-03-06 NOTE — PROGRESS NOTES
Subjective   Patient ID: Dennis Ryan is a 73 y.o. male is being seen for consultation today at the request of SEPIDEH Jones for back pain. Patient had MRI lumbar 2/20/19 and presents unaccompanied.     History of Present Illness 72yo  with a history of hip pain on the right since last June.  He saw Dr. Cr and hip evaluation was negative.  He has started PT.  He is out of pain x 1 week.  He reports PT was effective.  Pain is rated 0/10.  He continues to work at [employer] as a .  He has an irregular heartbeat by his account but this is not atrial fibrillation.  He denies LUBA's.  He denies radicular complaints below the knee.  He has a remote disc herniation which was managed non operatively.      The following portions of the patient's history were reviewed and updated as appropriate: allergies, current medications, past family history, past medical history, past social history, past surgical history and problem list.    Review of Systems   Constitutional: Negative for chills and fever.   HENT: Negative for trouble swallowing.    Eyes: Negative for visual disturbance.   Respiratory: Negative for cough, shortness of breath and wheezing.    Cardiovascular: Positive for palpitations (irregular heartbeat, has been addressed). Negative for chest pain.   Gastrointestinal: Negative for abdominal pain, nausea and vomiting.   Genitourinary: Negative for difficulty urinating and enuresis.   Musculoskeletal: Positive for back pain (occ'l).   Skin: Negative for rash.   Neurological: Negative for weakness and numbness.   Psychiatric/Behavioral: Negative for sleep disturbance.       Objective   Physical Exam   Constitutional: He is oriented to person, place, and time.   Neurological: He is oriented to person, place, and time. Gait normal.   Reflex Scores:       Tricep reflexes are 1+ on the right side and 1+ on the left side.       Bicep reflexes are 1+ on the right side and 1+ on the left side.        Brachioradialis reflexes are 1+ on the right side and 1+ on the left side.       Patellar reflexes are 1+ on the right side and 1+ on the left side.       Achilles reflexes are 1+ on the right side and 1+ on the left side.    Neurologic Exam     Mental Status   Oriented to person, place, and time.   Level of consciousness: alert    Motor Exam   Muscle bulk: normal  Overall muscle tone: normal    Strength   Right deltoid: 5/5  Left deltoid: 5/5  Right biceps: 5/5  Left biceps: 5/5  Right triceps: 5/5  Left triceps: 5/5  Right wrist extension: 5/5  Left wrist extension: 5/5  Right interossei: 5/5  Left interossei: 5/5  Right iliopsoas: 5/5  Left iliopsoas: 5/5  Right quadriceps: 5/5  Left quadriceps: 5/5  Right hamstrin/5  Left hamstrin/5  Right anterior tibial: 5/5  Left anterior tibial: 5/5  Right gastroc: 5/5  Left gastroc: 5/5    Sensory Exam   Right arm light touch: normal  Left arm light touch: normal  Right leg light touch: normal  Left leg light touch: normal  Right arm pinprick: normal  Left arm pinprick: normal  Right leg pinprick: normal  Left leg pinprick: normal    Gait, Coordination, and Reflexes     Gait  Gait: normal    Reflexes   Right brachioradialis: 1+  Left brachioradialis: 1+  Right biceps: 1+  Left biceps: 1+  Right triceps: 1+  Left triceps: 1+  Right patellar: 1+  Left patellar: 1+  Right achilles: 1+  Left achilles: 1+  Right San: absent  Left San: absent  Right ankle clonus: absent  Left ankle clonus: absent           Assessment/Plan   Independent Review of Radiographic Studies:  I reviewed his mri.  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  He has some adjacent segment disease. He has a mild right sided L23 disc.      Medical Decision Making:  He reports resolution of complaint with PT.  We discussed the use of nsaids if his sx returned.   We discussed neurogenic claudication.  We discussed weakness specifically in the L4 dermatome.  We discussed red flags.  We  discussed work paperwork. I gave him a woirk note until he completes PT as his note was scheduled to end before pt completed.  I will see him as needed.  We discussed care home.      Dennis was seen today for back pain.    Diagnoses and all orders for this visit:    Acute right hip pain      No Follow-up on file.

## 2019-03-07 ENCOUNTER — HOSPITAL ENCOUNTER (OUTPATIENT)
Dept: PHYSICAL THERAPY | Facility: HOSPITAL | Age: 74
Setting detail: THERAPIES SERIES
Discharge: HOME | End: 2019-03-07

## 2019-03-07 DIAGNOSIS — R29.898 WEAKNESS OF RIGHT LOWER EXTREMITY: ICD-10-CM

## 2019-03-07 DIAGNOSIS — M54.50 LUMBAR BACK PAIN: Primary | ICD-10-CM

## 2019-03-07 DIAGNOSIS — M25.60 DECREASED RANGE OF MOTION: ICD-10-CM

## 2019-03-07 PROCEDURE — 97110 THERAPEUTIC EXERCISES: CPT

## 2019-03-07 NOTE — THERAPY TREATMENT NOTE
Outpatient Physical Therapy Ortho Treatment Note  [facility]     Patient Name: Dennis Ryan  : 1945  MRN: [id number]  Today's Date: 3/7/2019      Visit Date: 2019    Visit Dx:    ICD-10-CM ICD-9-CM   1. Lumbar back pain M54.5 724.2   2. Weakness of right lower extremity R29.898 729.89   3. Decreased range of motion M25.60 719.50       Patient Active Problem List   Diagnosis   • Coronary artery disease involving native coronary artery of native heart without angina pectoris   • Fatigue   • White coat hypertension   • Hyperlipidemia   • Hypertension with heart disease   • Left ventricular hypertrophy   • Palpitations   • Irregular heart rhythm   • Visit for suture removal   • Kidney stone on left side   • Stenosis of carotid artery   • Health care maintenance   • Acute right hip pain   • Encounter for screening colonoscopy        Past Medical History:   Diagnosis Date   • Cardiovascular risk factor     No diabetes.  No hypertension.  Positive for hyperlipidemia.  Positive for family history of coronary artery disease with father with CAD age 60s, mother with heart problem at age 84, brother and sister x2 with hypertension   • Coronary artery disease    • Heart palpitations    • Hyperlipidemia    • Hypertension    • Kidney stone    • Left ventricular hypertrophy    • Palpitations         Past Surgical History:   Procedure Laterality Date   • APPENDECTOMY     • CORONARY ANGIOPLASTY WITH STENT PLACEMENT     • CYSTOSCOPY W/ URETERAL STENT PLACEMENT Left 2018    Procedure: CYSTOSCOPY, LEFT STENT PLACEMENT;  Surgeon: Jaydon Pereyra Jr., MD;  Location: [facility];  Service: Urology   • HERNIA REPAIR     • OTHER SURGICAL HISTORY      groin rupture    • PROSTATE SURGERY                         PT Assessment/Plan     Row Name 19 1400          PT Assessment    Assessment Comments  Pt saw neurosurgeon and reports it was a good meeting will not need to see him again unless things don't  improve or they get worse.  Worked on standing postural alignment and educated pt using mirror for feedback as well as musle chart.  He ayah  require reinforcement, consider reinforcement of current ex's v. quick progression as pt responds best to methodical, programmed instruction.  Added chin tuck and scap squeeze for posture.  Pt would greatly benefit from contiinued education progression for posture and ergomonomics and body mechanics to reduce stressors on spine and improve self-management  of symptoms.  He lifts boxes 50-75# at work and occasionally 100#.  -KARLA        PT Plan    Predicted Duration of Therapy Intervention (Therapy Eval)  ther ex, practice bending technique to prepare for lifting  -     PT Plan Comments  ther ex, may decrease stretch reps if able, cont educaton and Practice bending, reaching and lifting with good body mechanics as well as cue to ask for assistance when at work if a 100# box needs lifting; end with MH in supine 90/90 decompresson  -KARLA       User Key  (r) = Recorded By, (t) = Taken By, (c) = Cosigned By    Initials Name Provider Type    Guerita Dowell, PT Physical Therapist          Modalities     Row Name 03/07/19 1400             Moist Heat    MH Applied  Yes  -KARLA      Location  back in supine 90/90  -JA      Rx Minutes  10 mins  -JA      MH S/P Rx  Yes  -KARLA        User Key  (r) = Recorded By, (t) = Taken By, (c) = Cosigned By    Initials Name Provider Type    Guerita Dowell, PT Physical Therapist          Exercises     Row Name 03/07/19 1400             Subjective Comments    Subjective Comments  No pain.  I saw Dr. Cancino and he said things look good, only 20% chance of surgery.  I'm a little worried about one of my new exercises.  -KARLA         Subjective Pain    Able to rate subjective pain?  yes  -JA      Pre-Treatment Pain Level  0  -JA      Post-Treatment Pain Level  0  -JA         Total Minutes    27222 - PT Therapeutic Exercise Minutes  45  -JA          Exercise 1    Exercise Name 1  NuStep UE/LE L 4  -JA      Time 1  5 min  -JA         Exercise 2    Exercise Name 2  prone press up  -JA      Cueing 2  Demo  -JA      Reps 2  3  -JA      Time 2  15sec  -JA         Exercise 3    Exercise Name 3  glute sets  -JA      Reps 3  15  -JA      Time 3  3 sec  -JA         Exercise 4    Exercise Name 4  SKC  -JA      Cueing 4  Demo  -JA      Reps 4  3  -JA      Time 4  15sec  -JA         Exercise 5    Exercise Name 5  TA in HL w/PPT  -JA      Cueing 5  Demo  -JA      Reps 5  15  -JA      Time 5  3 sec  -JA         Exercise 6    Exercise Name 6  90/90 decompression position  -JA      Time 6  --  -JA      Additional Comments  reviewed, used at end of session  -JA         Exercise 7    Exercise Name 7  HL piriformis/hip stretch ER / IR bias  -JA      Cueing 7  Demo;Verbal  -JA      Reps 7  3  -JA      Time 7  20 sec ea  -JA         Exercise 8    Exercise Name 8  LTR  -JA      Cueing 8  Demo;Verbal  -JA      Reps 8  10  -JA      Time 8  5 sec  -JA      Additional Comments  required cuing to let feet rest on mat  -JA         Exercise 9    Exercise Name 9  standing TA activation, back at wall   -JA      Cueing 9  Verbal;Demo  -JA      Reps 9  10  -JA      Time 9  3 sec  -JA         Exercise 10    Exercise Name 10  TB scap ret / shoulder extension w/ TA  -JA      Cueing 10  Demo;Verbal  -JA      Reps 10  10 ea  -JA         Exercise 11    Exercise Name 11  HL hip add w/ TA using ball   -JA      Cueing 11  Verbal;Demo  -JA      Reps 11  10  -JA      Time 11  5 sec  -JA         Exercise 12    Exercise Name 12  TB HL hip abd w/ TA using green band  -JA      Cueing 12  Verbal;Demo  -JA      Reps 12  10  -JA      Time 12  2-3 sec  -JA         Exercise 13    Exercise Name 13  standing posture at mirror  -JA      Time 13  5 min  -JA      Additional Comments  cued straight--not locked--knees, chest up; performed t.a. intermittently  -JA         Exercise 14    Exercise Name 14  scap squeeze  gently  -JA      Cueing 14  Tactile  -JA      Reps 14  10  -JA      Additional Comments  copious cuing  -JA         Exercise 15    Exercise Name 15  chin tuck standing  -JA      Reps 15  8  -JA      Additional Comments  tactile cuing at chin and suboccipital region; stood at mirror  -JA         Exercise 16    Exercise Name 16  Log roll  -JA      Reps 16  2  -JA        User Key  (r) = Recorded By, (t) = Taken By, (c) = Cosigned By    Initials Name Provider Type    Guerita Dowell, PT Physical Therapist                         PT OP Goals     Row Name 03/07/19 1400          PT Short Term Goals    STG Date to Achieve  03/07/19  -JA     STG 1  Patient will be independent and compliant with initial HEP   -JA     STG 1 Progress  Progressing  -JA     STG 2  Patient will demonstrate correct posture in sitting and standing to decrease strain/pain on spine.  -JA     STG 2 Progress  Ongoing  -JA     STG 2 Progress Comments  mild to mod forward head posture  -JA     STG 3  Pt will be able to move through 50% of full trunk ROM.  -KARLA     STG 3 Progress  Ongoing  -KARLA        Long Term Goals    LTG Date to Achieve  03/21/19  -KARLA     LTG 1  Pt will be independent with advanced HEP for spinal stabilization and LE/core strengthening.  -JA     LTG 1 Progress  New  -JA     LTG 2  Pt will demonstrate correct body mechanics with bending, reaching, and lifting ADL’s.  -JA     LTG 2 Progress  New  -JA     LTG 3  Pt will be able to move through % of full trunk ROM without increased pain >1/10.  -JA     LTG 3 Progress  New  -JA     LTG 4  Pt will score 30% or less on Oswestry Disability Index indicating decrease in perceived functional disability.  -KARLA     LTG 4 Progress  New  -JA     LTG 5  Patient will make appropriate changes to work and home environment to the extent possible to reduce strain on spine.   -KARLA     LTG 5 Progress  New  -JA       User Key  (r) = Recorded By, (t) = Taken By, (c) = Cosigned By    Initials Name  Provider Type    Guerita Dowell, PT Physical Therapist          Therapy Education  Education Details: educatedd pt for posture at mirror and worked on straight but not locked knees, chest high, chin retracted.  Showed muscle chart to reinforce education--pt is a  and has some understanding of muscle anatomy.  Practiced log roll.  Do progress ergonomics and body mechanics for work and home related bending and lifting and reaching.  Given: Symptoms/condition management, Posture/body mechanics, HEP  Program: Reinforced, Progressed, New  How Provided: Verbal, Demonstration, Written  Provided to: Patient  Level of Understanding: Teach back education performed, Verbalized              Time Calculation:   Start Time: 1400  Stop Time: 1455  Time Calculation (min): 55 min  Therapy Suggested Charges     Code   Minutes Charges    14064 (CPT®) Hc Pt Neuromusc Re Education Ea 15 Min      80855 (CPT®) Hc Pt Ther Proc Ea 15 Min 45 3    13037 (CPT®) Hc Gait Training Ea 15 Min      46132 (CPT®) Hc Pt Therapeutic Act Ea 15 Min      62714 (CPT®) Hc Pt Manual Therapy Ea 15 Min      79431 (CPT®) Hc Pt Ther Massage- Per 15 Min      74392 (CPT®) Hc Pt Iontophoresis Ea 15 Min      82226 (CPT®) Hc Pt Elec Stim Ea-Per 15 Min      25045 (CPT®) Hc Pt Ultrasound Ea 15 Min      11102 (CPT®) Hc Pt Self Care/Mgmt/Train Ea 15 Min      05116 (CPT®) Hc Pt Prosthetic (S) Train Initial Encounter, Each 15 Min      18898 (CPT®) Hc Orthotic(S) Mgmt/Train Initial Encounter, Each 15min      60346 (CPT®) Hc Pt Aquatic Therapy Ea 15 Min      61766 (CPT®) Hc Pt Orthotic(S)/Prosthetic(S) Encounter, Each 15 Min       (CPT®) Hc Pt Electrical Stim Unattended      Total  45 3        Therapy Charges for Today     Code Description Service Date Service Provider Modifiers Qty    94373914498 HC PT THER PROC EA 15 MIN 3/7/2019 Guerita Gaming, PT GP 3    03097724614 HC PT HOT OR COLD PACK TREAT MCARE 3/7/2019 Guerita Gaming, PT GP 1                     Guerita Gaming, PT  3/7/2019

## 2019-03-12 ENCOUNTER — HOSPITAL ENCOUNTER (OUTPATIENT)
Dept: PHYSICAL THERAPY | Facility: HOSPITAL | Age: 74
Setting detail: THERAPIES SERIES
Discharge: HOME | End: 2019-03-12

## 2019-03-12 DIAGNOSIS — M25.60 DECREASED RANGE OF MOTION: ICD-10-CM

## 2019-03-12 DIAGNOSIS — R29.898 WEAKNESS OF RIGHT LOWER EXTREMITY: ICD-10-CM

## 2019-03-12 DIAGNOSIS — M54.50 LUMBAR BACK PAIN: Primary | ICD-10-CM

## 2019-03-12 PROCEDURE — 97110 THERAPEUTIC EXERCISES: CPT

## 2019-03-12 NOTE — THERAPY TREATMENT NOTE
Outpatient Physical Therapy Ortho Treatment Note  [facility]     Patient Name: Dennis Ryan  : 1945  MRN: [id number]  Today's Date: 3/12/2019      Visit Date: 2019    Visit Dx:    ICD-10-CM ICD-9-CM   1. Lumbar back pain M54.5 724.2   2. Weakness of right lower extremity R29.898 729.89   3. Decreased range of motion M25.60 719.50       Patient Active Problem List   Diagnosis   • Coronary artery disease involving native coronary artery of native heart without angina pectoris   • Fatigue   • White coat hypertension   • Hyperlipidemia   • Hypertension with heart disease   • Left ventricular hypertrophy   • Palpitations   • Irregular heart rhythm   • Visit for suture removal   • Kidney stone on left side   • Stenosis of carotid artery   • Health care maintenance   • Acute right hip pain   • Encounter for screening colonoscopy        Past Medical History:   Diagnosis Date   • Cardiovascular risk factor     No diabetes.  No hypertension.  Positive for hyperlipidemia.  Positive for family history of coronary artery disease with father with CAD age 60s, mother with heart problem at age 84, brother and sister x2 with hypertension   • Coronary artery disease    • Heart palpitations    • Hyperlipidemia    • Hypertension    • Kidney stone    • Left ventricular hypertrophy    • Palpitations         Past Surgical History:   Procedure Laterality Date   • APPENDECTOMY     • CORONARY ANGIOPLASTY WITH STENT PLACEMENT     • CYSTOSCOPY W/ URETERAL STENT PLACEMENT Left 2018    Procedure: CYSTOSCOPY, LEFT STENT PLACEMENT;  Surgeon: Jaydon Pereyra Jr., MD;  Location: [facility];  Service: Urology   • HERNIA REPAIR     • OTHER SURGICAL HISTORY      groin rupture    • PROSTATE SURGERY                         PT Assessment/Plan     Row Name 19 1100          PT Assessment    Assessment Comments  Progressed ther ex to simulate bending/lifting/reaching ADL's to prepare for increasing activity at home  and RTW.  He demonstrated understanding of the importance of correct body mechanics and emerging understanding of how to correctly bend, reach, and lift.  He ttolerated 11# used to today.  Will need to continue increasing strength and reinforce body mechanics.  Highly recommended pt work on body mechanics with all activity at home to improve ability and acquire good habit.  -JA        PT Plan    PT Plan Comments  assess pt's recall of proper bending, lifting and reaching and reiforce good body mechanics; progress weight  -KARLA       User Key  (r) = Recorded By, (t) = Taken By, (c) = Cosigned By    Initials Name Provider Type    Guerita Dowell, PT Physical Therapist          Modalities     Row Name 03/12/19 1100             Moist Heat    MH Applied  Yes  -JA      Location  back in supine 90/90  -JA      Rx Minutes  15 mins  -JA      MH S/P Rx  Yes  -JA        User Key  (r) = Recorded By, (t) = Taken By, (c) = Cosigned By    Initials Name Provider Type    Guerita Dowell, PT Physical Therapist          Exercises     Row Name 03/12/19 1100             Subjective Comments    Subjective Comments  Not haveing any pain whatsoever.  I am doing the exercises twice a day.  -KARLA         Subjective Pain    Able to rate subjective pain?  yes  -JA      Pre-Treatment Pain Level  0  -JA      Post-Treatment Pain Level  0  -JA         Total Minutes    34229 - PT Therapeutic Exercise Minutes  45  -JA         Exercise 1    Exercise Name 1  NuStep UE/LE  -JA      Time 1  5 min  -JA      Additional Comments  increased to L6  -JA         Exercise 2    Exercise Name 2  Body mechanics training: Worked on reaching to overhead shelf, bending to reach object on lower shelf.  -JA      Reps 2  5 ea  -JA         Exercise 3    Exercise Name 3  Body mechanics : worked on placing 4# above shoulder  -JA      Reps 3  5  -JA         Exercise 4    Exercise Name 4  Body mechanics: worked on box lift with stride stance and wide stance  -JA       Sets 4  3  -JA      Reps 4  3  -JA      Additional Comments  empty box, 7#, 11#  -JA         Exercise 5    Exercise Name 5  Body mechanics: box carry  -JA      Reps 5  2  -JA      Additional Comments  11# for 30'  -JA         Exercise 6    Exercise Name 6  Body mechanics: golfers lift for reaching over barrier and for lifting object from across barrier  -JA      Reps 6  5  -JA      Additional Comments  4#  -JA         Exercise 7    Exercise Name 7  Posture training back to wall, scap ret (press against wall) and chin tuck with lower neck extension  -JA      Reps 7  10  -JA         Exercise 8    Exercise Name 8  Scap ret with hands behind back  -JA      Sets 8  2  -JA      Reps 8  10  -JA      Additional Comments  1 set hands behind back to improve pt's ability to retract without engaging UT's  -JA         Exercise 9    Exercise Name 9  TB scap ret and ret with shoulder extension w/ TA  -JA      Sets 9  2  -JA      Reps 9  10ea  -JA      Additional Comments  RTB  -JA         Exercise 10    Exercise Name 10  Piriformis stretching with push forIR and pull for ER  -JA      Sets 10  2  -JA      Reps 10  3ea  -JA      Additional Comments  on MH  -JA         Exercise 11    Exercise Name 11  squats  -JA      Reps 11  10  -JA        User Key  (r) = Recorded By, (t) = Taken By, (c) = Cosigned By    Initials Name Provider Type    Guerita Dowell, PT Physical Therapist                         PT OP Goals     Row Name 03/12/19 1600          PT Short Term Goals    STG Date to Achieve  03/07/19  -JA     STG 1  Patient will be independent and compliant with initial HEP   -JA     STG 1 Progress  Progressing  -JA     STG 2  Patient will demonstrate correct posture in sitting and standing to decrease strain/pain on spine.  -JA     STG 2 Progress  Ongoing  -JA     STG 3  Pt will be able to move through 50% of full trunk ROM.  -KARLA     STG 3 Progress  Ongoing  -KARLA        Long Term Goals    LTG Date to Achieve  03/21/19  -JA      LTG 1  Pt will be independent with advanced HEP for spinal stabilization and LE/core strengthening.  -KARLA     LTG 1 Progress  Ongoing  -KARLA     LTG 1 Progress Comments  added squats  -KARLA     LTG 2  Pt will demonstrate correct body mechanics with bending, reaching, and lifting ADL’s.  -KARLA     LTG 2 Progress  New  -KARLA     LTG 3  Pt will be able to move through % of full trunk ROM without increased pain >1/10.  -KARLA     LTG 3 Progress  New  -KARLA     LTG 4  Pt will score 30% or less on Oswestry Disability Index indicating decrease in perceived functional disability.  -KARLA     LTG 4 Progress  New  -KARLA     LTG 5  Patient will make appropriate changes to work and home environment to the extent possible to reduce strain on spine.   -KARLA     LTG 5 Progress  New  -KARLA       User Key  (r) = Recorded By, (t) = Taken By, (c) = Cosigned By    Initials Name Provider Type    Guerita Dowell, PT Physical Therapist          Therapy Education  Education Details: worked on body mechanics for bending, lifting, and reaching to protect lumbar spine and prevent re-injury.  Recommended pt practice these things with all ADL's at home.  Given: Symptoms/condition management, Posture/body mechanics, HEP  Program: Reinforced, Progressed, New  How Provided: Verbal, Demonstration, Written  Provided to: Patient  Level of Understanding: Teach back education performed, Verbalized              Time Calculation:   Start Time: 1130  Stop Time: 1230  Time Calculation (min): 60 min  Therapy Suggested Charges     Code   Minutes Charges    36215 (CPT®) Hc Pt Neuromusc Re Education Ea 15 Min      32934 (CPT®) Hc Pt Ther Proc Ea 15 Min 45 3    35349 (CPT®) Hc Gait Training Ea 15 Min      40485 (CPT®) Hc Pt Therapeutic Act Ea 15 Min      37514 (CPT®) Hc Pt Manual Therapy Ea 15 Min      35107 (CPT®) Hc Pt Ther Massage- Per 15 Min      56372 (CPT®) Hc Pt Iontophoresis Ea 15 Min      74688 (CPT®) Hc Pt Elec Stim Ea-Per 15 Min      96932 (CPT®) Hc Pt  Ultrasound Ea 15 Min      81180 (CPT®) Hc Pt Self Care/Mgmt/Train Ea 15 Min      16896 (CPT®) Hc Pt Prosthetic (S) Train Initial Encounter, Each 15 Min      68662 (CPT®) Hc Orthotic(S) Mgmt/Train Initial Encounter, Each 15min      48190 (CPT®) Hc Pt Aquatic Therapy Ea 15 Min      08856 (CPT®) Hc Pt Orthotic(S)/Prosthetic(S) Encounter, Each 15 Min       (CPT®) Hc Pt Electrical Stim Unattended      Total  45 3        Therapy Charges for Today     Code Description Service Date Service Provider Modifiers Qty    06225437747 HC PT THER PROC EA 15 MIN 3/12/2019 Guerita Gaming, PT GP 3    84115468049 HC PT HOT OR COLD PACK TREAT MCARE 3/12/2019 Guerita Gaming, PT GP 1                    Guerita Gaming, PT  3/12/2019

## 2019-03-14 ENCOUNTER — HOSPITAL ENCOUNTER (OUTPATIENT)
Dept: PHYSICAL THERAPY | Facility: HOSPITAL | Age: 74
Setting detail: THERAPIES SERIES
Discharge: HOME | End: 2019-03-14

## 2019-03-14 DIAGNOSIS — R29.898 WEAKNESS OF RIGHT LOWER EXTREMITY: ICD-10-CM

## 2019-03-14 DIAGNOSIS — M25.60 DECREASED RANGE OF MOTION: ICD-10-CM

## 2019-03-14 DIAGNOSIS — M54.50 LUMBAR BACK PAIN: Primary | ICD-10-CM

## 2019-03-14 PROCEDURE — 97110 THERAPEUTIC EXERCISES: CPT

## 2019-03-14 NOTE — THERAPY TREATMENT NOTE
Outpatient Physical Therapy Ortho Treatment Note  [facility]     Patient Name: Dennis Ryan  : 1945  MRN: [id number]  Today's Date: 3/14/2019      Visit Date: 2019    Visit Dx:    ICD-10-CM ICD-9-CM   1. Lumbar back pain M54.5 724.2   2. Weakness of right lower extremity R29.898 729.89   3. Decreased range of motion M25.60 719.50       Patient Active Problem List   Diagnosis   • Coronary artery disease involving native coronary artery of native heart without angina pectoris   • Fatigue   • White coat hypertension   • Hyperlipidemia   • Hypertension with heart disease   • Left ventricular hypertrophy   • Palpitations   • Irregular heart rhythm   • Visit for suture removal   • Kidney stone on left side   • Stenosis of carotid artery   • Health care maintenance   • Acute right hip pain   • Encounter for screening colonoscopy        Past Medical History:   Diagnosis Date   • Cardiovascular risk factor     No diabetes.  No hypertension.  Positive for hyperlipidemia.  Positive for family history of coronary artery disease with father with CAD age 60s, mother with heart problem at age 84, brother and sister x2 with hypertension   • Coronary artery disease    • Heart palpitations    • Hyperlipidemia    • Hypertension    • Kidney stone    • Left ventricular hypertrophy    • Palpitations         Past Surgical History:   Procedure Laterality Date   • APPENDECTOMY     • CORONARY ANGIOPLASTY WITH STENT PLACEMENT     • CYSTOSCOPY W/ URETERAL STENT PLACEMENT Left 2018    Procedure: CYSTOSCOPY, LEFT STENT PLACEMENT;  Surgeon: Jaydon Pereyra Jr., MD;  Location: [facility];  Service: Urology   • HERNIA REPAIR     • OTHER SURGICAL HISTORY      groin rupture    • PROSTATE SURGERY                         PT Assessment/Plan     Row Name 19 1400          PT Assessment    Assessment Comments  Mr Ryan demonstrates improving understanding of good body mechanics with bending, lifting, and reaching.   Reinforcement required for log roll, pt was not rolling onto side before sitting up--will need to review next time.  He continues to be pain-free and reported no increased pain/soreness after last visit with new ex's.  Will progress weights next visit.  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  -JA        PT Plan    PT Plan Comments  progress weights, reinforce log roll, cont higher level strengthening; assess scap ret  -JA       User Key  (r) = Recorded By, (t) = Taken By, (c) = Cosigned By    Initials Name Provider Type    Guerita Dowell, PT Physical Therapist              Exercises     Row Name 03/14/19 1400             Subjective Comments    Subjective Comments  I resigned my job at [employer] because I didn't thnk I could go back to work without reinjuring my back.  I haven't had the disc pain in 7 days.  -JA         Subjective Pain    Able to rate subjective pain?  yes  -JA      Pre-Treatment Pain Level  0  -JA      Post-Treatment Pain Level  0  -JA         Total Minutes    18496 - PT Therapeutic Exercise Minutes  45  -JA         Exercise 1    Exercise Name 1  NuStep UE/LE  -JA      Time 1  5 min  -JA      Additional Comments  L6  -JA         Exercise 2    Exercise Name 2  Body mechanics training: Worked on reaching to overhead shelf, bending to reach object on lower shelf.  -JA      Reps 2  5 ea  -JA         Exercise 3    Exercise Name 3  Body mechanics : worked on placing 4# above shoulder  -JA      Reps 3  5  -JA         Exercise 4    Exercise Name 4  Body mechanics: worked on box lift with stride stance and wide stance  -JA      Sets 4  3  -JA      Reps 4  3  -JA         Exercise 5    Exercise Name 5  Body mechanics: box carry  -JA      Reps 5  2  -JA         Exercise 6    Exercise Name 6  Body mechanics: golfers lift  "for reaching over barrier and for lifting object from across barrier  -JA      Reps 6  5  -JA         Exercise 7    Exercise Name 7  Posture training back to wall, scap ret (press against wall) and chin tuck with lower neck extension  -JA      Reps 7  --  -JA         Exercise 8    Exercise Name 8  Scap ret with hands behind back  -JA      Sets 8  --  -JA      Reps 8  --  -JA         Exercise 9    Exercise Name 9  TB scap ret and ret with shoulder extension w/ TA  -JA      Sets 9  2  -JA      Reps 9  10ea  -JA         Exercise 10    Exercise Name 10  Piriformis stretching with push forIR and pull for ER  -JA      Sets 10  2  -JA      Reps 10  3ea  -JA         Exercise 11    Exercise Name 11  squats  -JA      Reps 11  10  -JA         Exercise 12    Exercise Name 12  Leg Press  -JA      Reps 12  15 x 2 sets  -JA         Exercise 13    Exercise Name 13  6\" lateral step up  -JA      Sets 13  2  -JA      Reps 13  5  -JA        User Key  (r) = Recorded By, (t) = Taken By, (c) = Cosigned By    Initials Name Provider Type    Guerita Dowell, PT Physical Therapist                             Therapy Education  Education Details: 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  Given: Symptoms/condition management, Posture/body mechanics, HEP  Program: Reinforced, Progressed  How Provided: Verbal, Demonstration, Written  Provided to: Patient  Level of Understanding: Teach back education performed, Verbalized              Time Calculation:   Start Time: 1400  Stop Time: 1445  Time Calculation (min): 45 min  Therapy Suggested Charges     Code   Minutes Charges    27108 (CPT®) Hc Pt Neuromusc Re Education Ea 15 Min      61096 (CPT®) Hc Pt Ther Proc Ea 15 Min 45 3    91222 (CPT®) Hc Gait Training Ea 15 Min      47991 (CPT®) Hc Pt Therapeutic Act Ea 15 Min      65696 (CPT®) Hc Pt Manual Therapy Ea 15 Min      40087 (CPT®) Hc Pt Ther Massage- Per 15 Min      " 39470 (CPT®) Hc Pt Iontophoresis Ea 15 Min      40271 (CPT®) Hc Pt Elec Stim Ea-Per 15 Min      99838 (CPT®) Hc Pt Ultrasound Ea 15 Min      46191 (CPT®) Hc Pt Self Care/Mgmt/Train Ea 15 Min      38511 (CPT®) Hc Pt Prosthetic (S) Train Initial Encounter, Each 15 Min      88095 (CPT®) Hc Orthotic(S) Mgmt/Train Initial Encounter, Each 15min      23615 (CPT®) Hc Pt Aquatic Therapy Ea 15 Min      02707 (CPT®) Hc Pt Orthotic(S)/Prosthetic(S) Encounter, Each 15 Min       (CPT®) Hc Pt Electrical Stim Unattended      Total  45 3        Therapy Charges for Today     Code Description Service Date Service Provider Modifiers Qty    32321159846 HC PT THER PROC EA 15 MIN 3/14/2019 Guerita Gaming, PT GP 3                    Guerita Gaming, PT  3/14/2019

## 2019-03-19 ENCOUNTER — HOSPITAL ENCOUNTER (OUTPATIENT)
Dept: PHYSICAL THERAPY | Facility: HOSPITAL | Age: 74
Setting detail: THERAPIES SERIES
Discharge: HOME | End: 2019-03-19

## 2019-03-19 DIAGNOSIS — M54.50 LUMBAR BACK PAIN: Primary | ICD-10-CM

## 2019-03-19 DIAGNOSIS — R29.898 WEAKNESS OF RIGHT LOWER EXTREMITY: ICD-10-CM

## 2019-03-19 DIAGNOSIS — M25.60 DECREASED RANGE OF MOTION: ICD-10-CM

## 2019-03-19 PROCEDURE — 97110 THERAPEUTIC EXERCISES: CPT

## 2019-03-19 NOTE — THERAPY TREATMENT NOTE
Outpatient Physical Therapy Ortho Treatment Note  [facility]     Patient Name: Dennis Ryan  : 1945  MRN: [id number]  Today's Date: 3/19/2019      Visit Date: 2019    Visit Dx:    ICD-10-CM ICD-9-CM   1. Lumbar back pain M54.5 724.2   2. Weakness of right lower extremity R29.898 729.89   3. Decreased range of motion M25.60 719.50       Patient Active Problem List   Diagnosis   • Coronary artery disease involving native coronary artery of native heart without angina pectoris   • Fatigue   • White coat hypertension   • Hyperlipidemia   • Hypertension with heart disease   • Left ventricular hypertrophy   • Palpitations   • Irregular heart rhythm   • Visit for suture removal   • Kidney stone on left side   • Stenosis of carotid artery   • Health care maintenance   • Acute right hip pain   • Encounter for screening colonoscopy        Past Medical History:   Diagnosis Date   • Cardiovascular risk factor     No diabetes.  No hypertension.  Positive for hyperlipidemia.  Positive for family history of coronary artery disease with father with CAD age 60s, mother with heart problem at age 84, brother and sister x2 with hypertension   • Coronary artery disease    • Heart palpitations    • Hyperlipidemia    • Hypertension    • Kidney stone    • Left ventricular hypertrophy    • Palpitations         Past Surgical History:   Procedure Laterality Date   • APPENDECTOMY     • CORONARY ANGIOPLASTY WITH STENT PLACEMENT     • CYSTOSCOPY W/ URETERAL STENT PLACEMENT Left 2018    Procedure: CYSTOSCOPY, LEFT STENT PLACEMENT;  Surgeon: Jaydon Pereyra Jr., MD;  Location: [facility];  Service: Urology   • HERNIA REPAIR     • OTHER SURGICAL HISTORY      groin rupture    • PROSTATE SURGERY                         PT Assessment/Plan     Row Name 19 1700          PT Assessment    Assessment Comments  Pt was able to tolerate 30# with box lift from floor and to carry 50' without increased pain.  He  demonstrates imcreased awareness of posture and body mechanics with ADL's.  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  We will increase weight next visit however pt understands that 55# may not be wise to lift at this point due to risk of reinjury.  Good progress toward goals.  -JA        PT Plan    PT Plan Comments  progress to 40#  -JA       User Key  (r) = Recorded By, (t) = Taken By, (c) = Cosigned By    Initials Name Provider Type    Guerita Dowell, PT Physical Therapist            Exercises     Row Name 03/19/19 1400             Subjective Pain    Able to rate subjective pain?  yes  -JA      Pre-Treatment Pain Level  0  -JA         Total Minutes    65500 - PT Therapeutic Exercise Minutes  45  -JA         Exercise 1    Exercise Name 1  NuStep UE/LE  -JA      Time 1  5 min  -JA      Additional Comments  L6; seat 10  -JA         Exercise 2    Exercise Name 2  Body mechanics training: Worked on reaching to overhead shelf, bending to reach object on lower shelf.  -JA      Reps 2  5 ea  -JA         Exercise 3    Exercise Name 3  Body mechanics : worked on placing 4# above shoulder  -JA      Reps 3  5  -JA      Additional Comments  --  -JA         Exercise 4    Exercise Name 4  Body mechanics: worked on box lift with stride stance and wide stance; from chair seat and from floor to waist  -JA      Sets 4  3  -JA      Reps 4  5  -JA      Time 4  10 min w/restbreaks  -JA      Additional Comments  15-30#  -JA         Exercise 5    Exercise Name 5  Body mechanics: box carry 50'  -JA      Sets 5  3  -JA      Reps 5  2  -JA      Time 5  10 min w/restbreaks  -JA      Additional Comments  15-30#  -JA         Exercise 6    Exercise Name 6  Body mechanics: golfers lift for reaching over barrier and for lifting object from across barrier  -JA      Reps 6  5  -JA      Additional Comments  --  -JA         Exercise 7    Exercise Name 7  Posture training back to  "wall, scap ret (press against wall) and chin tuck with lower neck extension  -JA         Exercise 8    Exercise Name 8  Scap ret with hands behind back  -JA         Exercise 9    Exercise Name 9  TB scap ret and ret with shoulder extension w/ TA  -JA      Sets 9  2  -JA      Reps 9  10ea  -JA      Additional Comments  --  -JA         Exercise 10    Exercise Name 10  Piriformis stretching with push forIR and pull for ER  -JA      Sets 10  2  -JA      Reps 10  3ea  -JA         Exercise 11    Exercise Name 11  squats  -JA      Reps 11  --  -JA         Exercise 12    Exercise Name 12  --  -JA      Reps 12  --  -JA         Exercise 13    Exercise Name 13  6\" lateral step up  -JA      Sets 13  2  -JA      Reps 13  5  -JA        User Key  (r) = Recorded By, (t) = Taken By, (c) = Cosigned By    Initials Name Provider Type    Guerita Dowell, PT Physical Therapist                       PT OP Goals     Row Name 03/19/19 1700          PT Short Term Goals    STG Date to Achieve  03/07/19  -KARLA     STG 1  Patient will be independent and compliant with initial HEP   -     STG 1 Progress  Met  -     STG 2  Patient will demonstrate correct posture in sitting and standing to decrease strain/pain on spine.  -     STG 2 Progress  Partially Met;Progressing  -     STG 2 Progress Comments  forward head  -     STG 3  Pt will be able to move through 50% of full trunk ROM.  -     STG 3 Progress  Met  -        Long Term Goals    LTG Date to Achieve  03/21/19  -KARLA     LTG 1  Pt will be independent with advanced HEP for spinal stabilization and LE/core strengthening.  -KARLA     LTG 1 Progress  Progressing;Partially Met  -     LTG 2  Pt will demonstrate correct body mechanics with bending, reaching, and lifting ADL’s.  -     LTG 2 Progress  Progressing;Partially Met  -     LTG 2 Progress Comments  excellent teachback with floor to waist lift   -     LTG 3  Pt will be able to move through % of full trunk ROM " without increased pain >1/10.  -KARLA     LTG 3 Progress  Met  -KARLA     LTG 4  Pt will score 30% or less on Oswestry Disability Index indicating decrease in perceived functional disability.  -KARLA     LTG 4 Progress  Ongoing  -KARLA     LTG 5  Patient will make appropriate changes to work and home environment to the extent possible to reduce strain on spine.   -KARLA     LTG 5 Progress  Partially Met  -KARLA       User Key  (r) = Recorded By, (t) = Taken By, (c) = Cosigned By    Initials Name Provider Type    Guerita Dowell, PT Physical Therapist          Therapy Education  Education Details: Worked on reinforcing log roll on opposite side than he usually gets out of bed-- bringing shoulders and hips together and being completely perpendicular (i.e. not leaning backward) prior to letting legs off side of bed and pushing up with arms.  Given: Symptoms/condition management, Posture/body mechanics, Mobility training  Program: Reinforced, Progressed  How Provided: Verbal, Demonstration  Provided to: Patient  Level of Understanding: Teach back education performed              Time Calculation:   Start Time: 1400  Stop Time: 1445  Time Calculation (min): 45 min  Therapy Charges for Today     Code Description Service Date Service Provider Modifiers Qty    78043315944  PT THER PROC EA 15 MIN 3/19/2019 Guerita Gaming PT GP 3                    Guerita Gaming PT  3/19/2019

## 2019-03-21 ENCOUNTER — APPOINTMENT (OUTPATIENT)
Dept: PHYSICAL THERAPY | Facility: HOSPITAL | Age: 74
End: 2019-03-21

## 2019-03-27 ENCOUNTER — HOSPITAL ENCOUNTER (OUTPATIENT)
Dept: PHYSICAL THERAPY | Facility: HOSPITAL | Age: 74
Setting detail: THERAPIES SERIES
Discharge: HOME | End: 2019-03-27

## 2019-03-27 DIAGNOSIS — R29.898 WEAKNESS OF RIGHT LOWER EXTREMITY: ICD-10-CM

## 2019-03-27 DIAGNOSIS — M25.60 DECREASED RANGE OF MOTION: ICD-10-CM

## 2019-03-27 DIAGNOSIS — M54.50 LUMBAR BACK PAIN: Primary | ICD-10-CM

## 2019-03-27 PROCEDURE — 97110 THERAPEUTIC EXERCISES: CPT

## 2019-03-29 NOTE — THERAPY DISCHARGE NOTE
Outpatient Physical Therapy Ortho Treatment Note/Discharge Summary  [facility]     Patient Name: Dennis Ryan  : 1945  MRN: [id number]  Today's Date: 3/27/2019      Visit Date: 2019    Visit Dx:    ICD-10-CM ICD-9-CM   1. Lumbar back pain M54.5 724.2   2. Weakness of right lower extremity R29.898 729.89   3. Decreased range of motion M25.60 719.50       Patient Active Problem List   Diagnosis   • Coronary artery disease involving native coronary artery of native heart without angina pectoris   • Fatigue   • White coat hypertension   • Hyperlipidemia   • Hypertension with heart disease   • Left ventricular hypertrophy   • Palpitations   • Irregular heart rhythm   • Visit for suture removal   • Kidney stone on left side   • Stenosis of carotid artery   • Health care maintenance   • Acute right hip pain   • Encounter for screening colonoscopy        Past Medical History:   Diagnosis Date   • Cardiovascular risk factor     No diabetes.  No hypertension.  Positive for hyperlipidemia.  Positive for family history of coronary artery disease with father with CAD age 60s, mother with heart problem at age 84, brother and sister x2 with hypertension   • Coronary artery disease    • Heart palpitations    • Hyperlipidemia    • Hypertension    • Kidney stone    • Left ventricular hypertrophy    • Palpitations         Past Surgical History:   Procedure Laterality Date   • APPENDECTOMY     • CORONARY ANGIOPLASTY WITH STENT PLACEMENT     • CYSTOSCOPY W/ URETERAL STENT PLACEMENT Left 2018    Procedure: CYSTOSCOPY, LEFT STENT PLACEMENT;  Surgeon: Jaydon Pereyra Jr., MD;  Location: [facility];  Service: Urology   • HERNIA REPAIR     • OTHER SURGICAL HISTORY      groin rupture    • PROSTATE SURGERY               19 1600   PT Assessment   Assessment Comments Pt demonstrates excellent progress with physical therapy with no exacerbation of pain since his initial visit and demonstration of improved  posture and body mechanics with ADL's and bending, reaching, and lifting.  He was able to progressively lift heavier weight including 40# box lift today with correct form and no pain.  He verbalizes emergence of understanding of applying correct posture body mechanics with all activity to protect his back.  He has met all goals and is ready for discharge to [facility].   PT Plan   PT Plan Comments D/C         03/27/19 1300   Subjective Comments   Subjective Comments No pain. I know I said it before but this has really helped me.  I was skeptical that it would.   Subjective Pain   Able to rate subjective pain? yes   Pre-Treatment Pain Level 0   Total Minutes   26428 - PT Therapeutic Exercise Minutes 40   Exercise 1   Exercise Name 1 NuStep UE/LE   Time 1 5 min   Additional Comments L6; seat 10   Exercise 2   Exercise Name 2 Body mechanics training: Worked on reaching to overhead shelf, bending to reach object on lower shelf.   Reps 2 5 ea   Exercise 3   Exercise Name 3 Body mechanics : worked on placing 7# above shoulder   Reps 3 5   Exercise 4   Exercise Name 4 Body mechanics: worked on box lift with stride stance and wide stance; from chair seat and from floor to waist   Sets 4 3   Reps 4 5   Time 4 10 min w/restbreaks   Exercise 5   Exercise Name 5 Body mechanics: box carry 50'   Sets 5 3   Reps 5 2   Time 5 10 min w/restbreaks   Additional Comments 40#   Exercise 6   Exercise Name 6 Body mechanics: golfers lift for reaching over barrier and for lifting object from across barrier   Reps 6 5   Exercise 7   Exercise Name 7 reviewed posture   Time 7 2 min   Exercise 8   Exercise Name 8 Scap ret with hands behind back   Reps 8 15   Exercise 9   Exercise Name 9 TB scap ret and ret with shoulder extension w/ TA   Sets 9 2   Reps 9 10ea   Additional Comments increased to BTB   Exercise 10   Exercise Name 10 Piriformis stretching with push forIR and pull for ER   Sets 10 2   Reps 10 3ea   Exercise 11   Exercise Name 11 squats  "  Reps 11 10   Exercise 13   Exercise Name 13 6\" lateral step up   Sets 13 2   Reps 13 10             03/27/19 1400   PT Short Term Goals   STG Date to Achieve 03/07/19   STG 1 Patient will be independent and compliant with initial HEP    STG 1 Progress Met   STG 2 Patient will demonstrate correct posture in sitting and standing to decrease strain/pain on spine.   STG 2 Progress Met   STG 3 Pt will be able to move through 50% of full trunk ROM.   STG 3 Progress Met   Long Term Goals   LTG Date to Achieve 03/21/19   LTG 1 Pt will be independent with advanced HEP for spinal stabilization and LE/core strengthening.   LTG 1 Progress Progressing;Partially Met   LTG 2 Pt will demonstrate correct body mechanics with bending, reaching, and lifting ADL’s.   LTG 2 Progress Met   LTG 2 Progress Comments pt demonstrated good body mechanics with bending and lifting and carrying 40# box in clinic without increased pain.   LTG 3 Pt will be able to move through % of full trunk ROM without increased pain >1/10.   LTG 3 Progress Met   LTG 4 Pt will score 30% or less on Oswestry Disability Index indicating decrease in perceived functional disability.   LTG 4 Progress Met   LTG 4 Progress Comments score: 0%, pt reports no perceived functional disability   LTG 5 Patient will make appropriate changes to work and home environment to the extent possible to reduce strain on spine.    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 Reinforced lifting body mechanics and increased to 40#.  Given: Symptoms/condition management, Posture/body mechanics, Mobility training  Program: Reinforced, Progressed  How Provided: Verbal, Demonstration  Provided to: Patient  Level of Understanding: Teach back education performed              Time Calculation: "   Start Time: 1315  Stop Time: 1400  Time Calculation (min): 45 min            OP PT Discharge Summary  Date of Discharge: 03/27/19  Reason for Discharge: All goals achieved  Outcomes Achieved: Able to achieve all goals within established timeline  Discharge Destination: Home with home program      Guerita Gaming, PT  3/27/2019

## 2019-04-30 ENCOUNTER — TELEPHONE (OUTPATIENT)
Dept: INTERNAL MEDICINE | Facility: CLINIC | Age: 74
End: 2019-04-30

## 2019-04-30 RX ORDER — ATORVASTATIN CALCIUM 10 MG/1
10 TABLET, FILM COATED ORAL DAILY
Qty: 14 TABLET | Refills: 0 | Status: CANCELLED | OUTPATIENT
Start: 2019-04-30

## 2019-05-21 ENCOUNTER — OFFICE VISIT (OUTPATIENT)
Dept: INTERNAL MEDICINE | Facility: CLINIC | Age: 74
End: 2019-05-21

## 2019-05-21 VITALS
SYSTOLIC BLOOD PRESSURE: 156 MMHG | BODY MASS INDEX: 27.47 KG/M2 | DIASTOLIC BLOOD PRESSURE: 84 MMHG | TEMPERATURE: 96.8 F | HEART RATE: 76 BPM | WEIGHT: 186 LBS | OXYGEN SATURATION: 98 %

## 2019-05-21 DIAGNOSIS — R10.12 LEFT UPPER QUADRANT PAIN: Primary | ICD-10-CM

## 2019-05-21 DIAGNOSIS — R10.32 LEFT INGUINAL PAIN: ICD-10-CM

## 2019-05-21 PROCEDURE — 99214 OFFICE O/P EST MOD 30 MIN: CPT | Performed by: NURSE PRACTITIONER

## 2019-05-21 NOTE — PROGRESS NOTES
"Subjective   Dennis Ryan is a 73 y.o. male.   Chief Complaint   Patient presents with   • sore spot on lower abdomen       Patient presents for evaluation of \"sore abdomen.\"  This is a 73-year-old male patient of Dr. Cr.  He states that about 8 weeks ago, he developed right upper quadrant abdominal pain that subsided when he stopped taking his atorvastatin.  The right upper quadrant pain has not come back and he has stayed off of the atorvastatin.  About 4 weeks ago, he states that he developed some left upper quadrant abdominal pain that he rates at a 1 out of 10, intermittent, described as \"sore and achy.\"  He also endorses some left groin pain to the touch that developed about 3 days ago.  He denies any trouble voiding, frequency, urgency, burning on urination, fevers, chills, nausea, vomiting, diarrhea, constipation.  He states that he is a high-fiber diet and has not had any trouble with bowel movements.  He has not seen any blood or mucus in his urine or stool.  He has had no shortness of breath or chest discomfort.  He denies development of any other new issues today.         The following portions of the patient's history were reviewed and updated as appropriate: allergies, current medications, past family history, past medical history, past social history, past surgical history and problem list.    Review of Systems   Constitutional: Negative for activity change, chills, fatigue, fever, unexpected weight gain and unexpected weight loss.   HENT: Negative for congestion, hearing loss, postnasal drip, sinus pressure, sneezing, sore throat and tinnitus.    Eyes: Negative for photophobia, pain and visual disturbance.   Respiratory: Negative for cough, chest tightness, shortness of breath and wheezing.    Cardiovascular: Negative for chest pain, palpitations and leg swelling.   Gastrointestinal: Positive for abdominal pain (  Left upper abdominal pain, left groin pain). Negative for abdominal " distention, constipation, diarrhea, nausea and vomiting.   Endocrine: Negative for polydipsia, polyphagia and polyuria.   Genitourinary: Negative for dysuria, frequency, hematuria and urgency.   Neurological: Negative for dizziness, weakness, numbness and headache.   All other systems reviewed and are negative.      Objective    /84 (BP Location: Left arm, Patient Position: Sitting, Cuff Size: Adult)   Pulse 76   Temp 96.8 °F (36 °C) (Tympanic)   Wt 84.4 kg (186 lb)   SpO2 98%   BMI 27.47 kg/m²     Physical Exam   Constitutional: He is oriented to person, place, and time. He appears well-developed and well-nourished.   HENT:   Head: Normocephalic and atraumatic.   Eyes: EOM are normal. Pupils are equal, round, and reactive to light.   Neck: Normal range of motion. Neck supple.   Cardiovascular: Normal rate, regular rhythm, normal heart sounds and intact distal pulses. Exam reveals no gallop and no friction rub.   No murmur heard.  Pulmonary/Chest: Effort normal and breath sounds normal. No stridor. No respiratory distress. He has no wheezes. He has no rales. He exhibits no tenderness.   Lungs are CTA bilaterally   Abdominal: Soft. Normal appearance and bowel sounds are normal. He exhibits no distension and no mass. There is no hepatosplenomegaly. There is no tenderness. There is no rebound, no guarding and no CVA tenderness. No hernia.   Bowel sounds active x4 quadrants.  No pain to light or deep palpation x4 quadrants.  Slight tenderness to left groin on palpation.  No swelling or nodularity noted.   Musculoskeletal: Normal range of motion.   Neurological: He is alert and oriented to person, place, and time.   Skin: Skin is warm and dry. Capillary refill takes less than 2 seconds.   Psychiatric: He has a normal mood and affect. His behavior is normal. Judgment and thought content normal.   Nursing note and vitals reviewed.    Current outpatient and discharge medications have been reconciled for the  patient.  Reviewed by: SEPIDEH Jones      Assessment/Plan   Dennis was seen today for sore spot on lower abdomen.    Diagnoses and all orders for this visit:    Left upper quadrant pain  -     CBC & Differential  -     Comprehensive metabolic panel  -     CT Abdomen Pelvis With Contrast; Future  -     Urinalysis With Microscopic - Urine, Clean Catch  -     Urine Culture - Urine, Urine, Clean Catch    Left inguinal pain  -     CBC & Differential  -     Comprehensive metabolic panel  -     CT Abdomen Pelvis With Contrast; Future  -     Urinalysis With Microscopic - Urine, Clean Catch  -     Urine Culture - Urine, Urine, Clean Catch      -Left upper quadrant abdominal pain, left inguinal pain: No abnormalities are noted on physical exam.  Will get a CBC and CMP for further evaluation as well as urinalysis with microscopic UA, urine culture, and CT of the abdomen and pelvis with contrast.  He does have a history of a right inguinal hernia but never the left.    -We will contact patient with the results of his labs and imaging and any further recommend patients.  Follow-up PRN and routinely.

## 2019-05-23 ENCOUNTER — HOSPITAL ENCOUNTER (OUTPATIENT)
Dept: CT IMAGING | Facility: HOSPITAL | Age: 74
Discharge: HOME | End: 2019-05-23
Admitting: NURSE PRACTITIONER

## 2019-05-23 DIAGNOSIS — R10.12 LEFT UPPER QUADRANT PAIN: ICD-10-CM

## 2019-05-23 DIAGNOSIS — R10.32 LEFT INGUINAL PAIN: ICD-10-CM

## 2019-05-23 LAB
ALBUMIN SERPL-MCNC: 4.5 G/DL (ref 3.5–5.2)
ALBUMIN/GLOB SERPL: 1.6 G/DL
ALP SERPL-CCNC: 71 U/L (ref 39–117)
ALT SERPL-CCNC: 21 U/L (ref 1–41)
APPEARANCE UR: CLEAR
AST SERPL-CCNC: 18 U/L (ref 1–40)
BACTERIA #/AREA URNS HPF: NORMAL /HPF
BACTERIA UR CULT: NO GROWTH
BACTERIA UR CULT: NORMAL
BASOPHILS # BLD AUTO: 0.06 10*3/MM3 (ref 0–0.2)
BASOPHILS NFR BLD AUTO: 0.9 % (ref 0–1.5)
BILIRUB SERPL-MCNC: 0.4 MG/DL (ref 0.2–1.2)
BILIRUB UR QL STRIP: NEGATIVE
BUN SERPL-MCNC: 21 MG/DL (ref 8–23)
BUN/CREAT SERPL: 23.9 (ref 7–25)
CALCIUM SERPL-MCNC: 9.9 MG/DL (ref 8.6–10.5)
CASTS URNS MICRO: NORMAL
CHLORIDE SERPL-SCNC: 101 MMOL/L (ref 98–107)
CO2 SERPL-SCNC: 25.4 MMOL/L (ref 22–29)
COLOR UR: YELLOW
CREAT BLDA-MCNC: 0.9 MG/DL (ref 0.6–1.3)
CREAT SERPL-MCNC: 0.88 MG/DL (ref 0.76–1.27)
EOSINOPHIL # BLD AUTO: 0.07 10*3/MM3 (ref 0–0.4)
EOSINOPHIL NFR BLD AUTO: 1 % (ref 0.3–6.2)
EPI CELLS #/AREA URNS HPF: NORMAL /HPF
ERYTHROCYTE [DISTWIDTH] IN BLOOD BY AUTOMATED COUNT: 14.6 % (ref 12.3–15.4)
GLOBULIN SER CALC-MCNC: 2.8 GM/DL
GLUCOSE SERPL-MCNC: 110 MG/DL (ref 65–99)
GLUCOSE UR QL: NEGATIVE
HCT VFR BLD AUTO: 49.7 % (ref 37.5–51)
HGB BLD-MCNC: 16.4 G/DL (ref 13–17.7)
HGB UR QL STRIP: NEGATIVE
IMM GRANULOCYTES # BLD AUTO: 0.02 10*3/MM3 (ref 0–0.05)
IMM GRANULOCYTES NFR BLD AUTO: 0.3 % (ref 0–0.5)
KETONES UR QL STRIP: NEGATIVE
LEUKOCYTE ESTERASE UR QL STRIP: NEGATIVE
LYMPHOCYTES # BLD AUTO: 1.67 10*3/MM3 (ref 0.7–3.1)
LYMPHOCYTES NFR BLD AUTO: 25 % (ref 19.6–45.3)
MCH RBC QN AUTO: 28.3 PG (ref 26.6–33)
MCHC RBC AUTO-ENTMCNC: 33 G/DL (ref 31.5–35.7)
MCV RBC AUTO: 85.7 FL (ref 79–97)
MONOCYTES # BLD AUTO: 0.57 10*3/MM3 (ref 0.1–0.9)
MONOCYTES NFR BLD AUTO: 8.5 % (ref 5–12)
NEUTROPHILS # BLD AUTO: 4.3 10*3/MM3 (ref 1.7–7)
NEUTROPHILS NFR BLD AUTO: 64.3 % (ref 42.7–76)
NITRITE UR QL STRIP: NEGATIVE
NRBC BLD AUTO-RTO: 0 /100 WBC (ref 0–0.2)
PH UR STRIP: 5.5 [PH] (ref 5–8)
PLATELET # BLD AUTO: 215 10*3/MM3 (ref 140–450)
POTASSIUM SERPL-SCNC: 4.6 MMOL/L (ref 3.5–5.2)
PROT SERPL-MCNC: 7.3 G/DL (ref 6–8.5)
PROT UR QL STRIP: NEGATIVE
RBC # BLD AUTO: 5.8 10*6/MM3 (ref 4.14–5.8)
RBC #/AREA URNS HPF: NORMAL /HPF
SODIUM SERPL-SCNC: 139 MMOL/L (ref 136–145)
SP GR UR: 1.01 (ref 1–1.03)
UROBILINOGEN UR STRIP-MCNC: (no result) MG/DL
WBC # BLD AUTO: 6.69 10*3/MM3 (ref 3.4–10.8)
WBC #/AREA URNS HPF: NORMAL /HPF

## 2019-05-23 PROCEDURE — 74177 CT ABD & PELVIS W/CONTRAST: CPT

## 2019-05-23 PROCEDURE — 82565 ASSAY OF CREATININE: CPT

## 2019-05-23 PROCEDURE — 25010000002 IOPAMIDOL 61 % SOLUTION: Performed by: NURSE PRACTITIONER

## 2019-05-23 RX ADMIN — IOPAMIDOL 85 ML: 612 INJECTION, SOLUTION INTRAVENOUS at 15:11

## 2019-05-23 NOTE — PROGRESS NOTES
Please notify patient that his labs came back normal including CMP, CBC, urinalysis, urine culture.  I will let him know the results of his CT as soon as I have them.

## 2019-05-23 NOTE — PROGRESS NOTES
Please notify patient that his CT of the abdomen and pelvis came back negative for any acute abnormalities within the abdomen or pelvis.  He has a couple of small cysts on the kidneys.  Let me know if symptoms persist or worsen.

## 2019-06-25 DIAGNOSIS — I11.9 HYPERTENSION WITH HEART DISEASE: ICD-10-CM

## 2019-06-25 RX ORDER — RAMIPRIL 5 MG/1
5 CAPSULE ORAL DAILY
Qty: 90 CAPSULE | Refills: 0 | Status: SHIPPED | OUTPATIENT
Start: 2019-06-25 | End: 2019-09-26 | Stop reason: SDUPTHER

## 2019-08-15 DIAGNOSIS — I11.9 HYPERTENSION WITH HEART DISEASE: ICD-10-CM

## 2019-08-15 RX ORDER — ATORVASTATIN CALCIUM 20 MG/1
20 TABLET, FILM COATED ORAL DAILY
Qty: 90 TABLET | Refills: 0 | Status: SHIPPED | OUTPATIENT
Start: 2019-08-15 | End: 2019-11-26 | Stop reason: SDUPTHER

## 2019-09-26 DIAGNOSIS — I11.9 HYPERTENSION WITH HEART DISEASE: ICD-10-CM

## 2019-09-26 RX ORDER — RAMIPRIL 5 MG/1
5 CAPSULE ORAL DAILY
Qty: 90 CAPSULE | Refills: 0 | Status: SHIPPED | OUTPATIENT
Start: 2019-09-26 | End: 2019-12-26

## 2019-11-05 ENCOUNTER — OFFICE VISIT (OUTPATIENT)
Dept: CARDIOLOGY | Facility: CLINIC | Age: 74
End: 2019-11-05

## 2019-11-05 VITALS
SYSTOLIC BLOOD PRESSURE: 114 MMHG | BODY MASS INDEX: 27.4 KG/M2 | HEART RATE: 64 BPM | WEIGHT: 185 LBS | DIASTOLIC BLOOD PRESSURE: 70 MMHG | HEIGHT: 69 IN

## 2019-11-05 DIAGNOSIS — I79.8 OTHER DISORDERS OF ARTERIES, ARTERIOLES AND CAPILLARIES IN DISEASES CLASSIFIED ELSEWHERE (HCC): ICD-10-CM

## 2019-11-05 DIAGNOSIS — I77.9 BILATERAL CAROTID ARTERY DISEASE, UNSPECIFIED TYPE (HCC): ICD-10-CM

## 2019-11-05 DIAGNOSIS — R00.2 PALPITATIONS: ICD-10-CM

## 2019-11-05 DIAGNOSIS — I11.9 HYPERTENSION WITH HEART DISEASE: ICD-10-CM

## 2019-11-05 DIAGNOSIS — I25.10 CORONARY ARTERY DISEASE INVOLVING NATIVE CORONARY ARTERY OF NATIVE HEART WITHOUT ANGINA PECTORIS: Primary | ICD-10-CM

## 2019-11-05 PROCEDURE — 99213 OFFICE O/P EST LOW 20 MIN: CPT | Performed by: INTERNAL MEDICINE

## 2019-11-05 PROCEDURE — 93000 ELECTROCARDIOGRAM COMPLETE: CPT | Performed by: INTERNAL MEDICINE

## 2019-11-05 NOTE — PROGRESS NOTES
Date of Office Visit: 2019  Encounter Provider: Juliann Lagos MD  Place of Service: [facility]  Patient Name: Dennis Ryan  :1945    Chief complaint  Coronary artery disease and hypertension    History of Present Illness  The patient is a 74-year-old gentleman with history of hypertension and hyperlipidemia who was diagnosed in  with modest disease of the LAD.  He has been seen intermittently since then.  He had an echocardiogram in  which revealed normal systolic function with left ventricular hypertrophy and mild mitral regurgitation.  In  he also wore an event monitor which showed no arrhythmia.  When he saw Dr. Ríos last in 2017 a stress test was recommended; however, the patient deferred.  A stress echo and carotid Doppler were ordered in 2018 but he did not complete this.    Since last visit he is walking 2 miles a day 5 times a week.  He has palpitations that occur several times a week lasting 5 to 10 seconds occasionally with tachycardia though this occurs only every 6 weeks.  It may last up to 4 minutes in duration.  He denies any shortness of breath chest pain syncope near syncope.    Past Medical History:   Diagnosis Date   • Cardiovascular risk factor     No diabetes.  No hypertension.  Positive for hyperlipidemia.  Positive for family history of coronary artery disease with father with CAD age 60s, mother with heart problem at age 84, brother and sister x2 with hypertension   • Coronary artery disease    • Heart palpitations    • Hyperlipidemia    • Hypertension    • Kidney stone    • Left ventricular hypertrophy    • Palpitations      Past Surgical History:   Procedure Laterality Date   • APPENDECTOMY     • CORONARY ANGIOPLASTY WITH STENT PLACEMENT     • CYSTOSCOPY W/ URETERAL STENT PLACEMENT Left 2018    Procedure: CYSTOSCOPY, LEFT STENT PLACEMENT;  Surgeon: Jaydon Pereyra Jr., MD;  Location: [facility] OR;   Service: Urology   • HERNIA REPAIR     • OTHER SURGICAL HISTORY      groin rupture    • PROSTATE SURGERY       Outpatient Medications Prior to Visit   Medication Sig Dispense Refill   • aspirin 81 MG EC tablet Take 162 mg by mouth Daily.     • atorvastatin (LIPITOR) 20 MG tablet Take 1 tablet by mouth Daily. 90 tablet 0   • ramipril (ALTACE) 5 MG capsule Take 1 capsule by mouth Daily. 90 capsule 0     No facility-administered medications prior to visit.        Allergies as of 11/05/2019   • (No Known Allergies)     Social History     Socioeconomic History   • Marital status: Single     Spouse name: Not on file   • Number of children: Not on file   • Years of education: Not on file   • Highest education level: Not on file   Occupational History   • Occupation:      Comment: full time   Tobacco Use   • Smoking status: Former Smoker   • Smokeless tobacco: Never Used   Substance and Sexual Activity   • Alcohol use: No     Comment: No caffeine use   • Drug use: No     Family History   Problem Relation Age of Onset   • Other Mother         CARDIAC DISORDER   • Other Father    • Stroke Father         CEREBROVASCULAR   • Heart disease Father    • Diabetes Father    • Hypertension Other    • Cancer Sister      Review of Systems   Constitution: Negative for fever, malaise/fatigue, weight gain and weight loss.   HENT: Negative for ear pain, hearing loss, nosebleeds and sore throat.    Eyes: Negative for double vision, pain, vision loss in left eye and vision loss in right eye.   Cardiovascular: Positive for leg swelling.        See history of present illness.   Respiratory: Negative for cough, shortness of breath, sleep disturbances due to breathing, snoring and wheezing.    Endocrine: Negative for cold intolerance, heat intolerance and polyuria.   Skin: Negative for itching, poor wound healing and rash.   Musculoskeletal: Negative for joint pain, joint swelling and myalgias.   Gastrointestinal: Negative for abdominal  "pain, diarrhea, hematochezia, nausea and vomiting.   Genitourinary: Negative for hematuria and hesitancy.   Neurological: Negative for numbness, paresthesias and seizures.   Psychiatric/Behavioral: Negative for depression. The patient is not nervous/anxious.         Objective:     Vitals:    11/05/19 0943   BP: 114/70   Pulse: 64   Weight: 83.9 kg (185 lb)   Height: 175.3 cm (69\")     Body mass index is 27.32 kg/m².    Physical Exam   Constitutional: He is oriented to person, place, and time. He appears well-developed and well-nourished.   HENT:   Head: Normocephalic.   Nose: Nose normal.   Mouth/Throat: Oropharynx is clear and moist.   Eyes: Conjunctivae and EOM are normal. Pupils are equal, round, and reactive to light. Right eye exhibits no discharge. No scleral icterus.   Neck: Normal range of motion. Neck supple. No JVD present. No thyromegaly present.   Cardiovascular: Normal rate, regular rhythm, normal heart sounds and intact distal pulses. Exam reveals no gallop and no friction rub.   No murmur heard.  Pulses:       Carotid pulses are 2+ on the right side, and 2+ on the left side.       Radial pulses are 2+ on the right side, and 2+ on the left side.        Femoral pulses are 2+ on the right side, and 2+ on the left side.       Popliteal pulses are 2+ on the right side, and 2+ on the left side.        Dorsalis pedis pulses are 2+ on the right side, and 2+ on the left side.        Posterior tibial pulses are 2+ on the right side, and 2+ on the left side.   Pulmonary/Chest: Effort normal and breath sounds normal. No respiratory distress. He has no wheezes. He has no rales.   Abdominal: Soft. Bowel sounds are normal. He exhibits no distension. There is no hepatosplenomegaly. There is no tenderness. There is no rebound.   Musculoskeletal: Normal range of motion. He exhibits no edema or tenderness.   Neurological: He is alert and oriented to person, place, and time.   Skin: Skin is warm and dry. No rash noted. " No erythema.   Psychiatric: He has a normal mood and affect. His behavior is normal. Judgment and thought content normal.   Vitals reviewed.    Lab Review:     ECG 12 Lead  Date/Time: 11/5/2019 9:44 AM  Performed by: Juliann Lagos MD  Authorized by: Juliann Lagos MD   Comparison: compared with previous ECG   Similar to previous ECG  Rhythm: sinus rhythm  Conduction: 1st degree AV block  Other findings: poor R wave progression    Clinical impression: abnormal EKG          Assessment:       Diagnosis Plan   1. Coronary artery disease involving native coronary artery of native heart without angina pectoris  ECG 12 Lead    Adult Stress Echo W/ Cont or Stress Agent if Necessary Per Protocol   2. Hypertension with heart disease  ECG 12 Lead   3. Palpitations  Adult Stress Echo W/ Cont or Stress Agent if Necessary Per Protocol   4. Bilateral carotid artery disease, unspecified type (CMS/[facility])  Duplex Carotid Ultrasound CAR   5. Other disorders of arteries, arterioles and capillaries in diseases classified elsewhere (CMS/[facility])   Duplex Carotid Ultrasound CAR     Plan:       1.  Modest coronary artery disease.  Check a stress echo with concommittent palpitaitons.   2.  Hypertension.  Controlled  3.  Carotid artery disease(noted on a vascular screening study).  Did not do his carotid Doppler imaging last year has had recommended  4.  Palpitations with intermittent tachycardia.  No change in frequency and as long as echocardiogram does not show any major significant structural changes will not pursue further investigation unless symptoms change in frequency or symptomatology.  5.  Hypertension, as above.   6.  Elevated glucose, nonfasting in 5/2018  7.  Hyperlipidemia, controlled on labs dated 2018       Your medication list           Accurate as of 11/5/19 11:59 PM. If you have any questions, ask your nurse or doctor.               CONTINUE taking these medications      Instructions Last Dose Given Next Dose Due   aspirin 81 MG  EC tablet      Take 162 mg by mouth Daily.       atorvastatin 20 MG tablet  Commonly known as:  LIPITOR      Take 1 tablet by mouth Daily.       ramipril 5 MG capsule  Commonly known as:  ALTACE      Take 1 capsule by mouth Daily.              Patient is no longer taking -.  I corrected the med list to reflect this.  I did not stop these medications.    Dictated utilizing Dragon dictation

## 2019-11-21 ENCOUNTER — HOSPITAL ENCOUNTER (OUTPATIENT)
Dept: CARDIOLOGY | Facility: HOSPITAL | Age: 74
Discharge: HOME | End: 2019-11-21
Admitting: INTERNAL MEDICINE

## 2019-11-21 ENCOUNTER — HOSPITAL ENCOUNTER (OUTPATIENT)
Dept: CARDIOLOGY | Facility: HOSPITAL | Age: 74
Discharge: HOME | End: 2019-11-21

## 2019-11-21 DIAGNOSIS — I79.8 OTHER DISORDERS OF ARTERIES, ARTERIOLES AND CAPILLARIES IN DISEASES CLASSIFIED ELSEWHERE (HCC): ICD-10-CM

## 2019-11-21 DIAGNOSIS — I77.9 BILATERAL CAROTID ARTERY DISEASE, UNSPECIFIED TYPE (HCC): ICD-10-CM

## 2019-11-21 DIAGNOSIS — I25.10 CORONARY ARTERY DISEASE INVOLVING NATIVE CORONARY ARTERY OF NATIVE HEART WITHOUT ANGINA PECTORIS: ICD-10-CM

## 2019-11-21 DIAGNOSIS — R00.2 PALPITATIONS: ICD-10-CM

## 2019-11-21 LAB
BH CV ECHO MEAS - ACS: 1.8 CM
BH CV ECHO MEAS - AO MAX PG: 7.5 MMHG
BH CV ECHO MEAS - AO ROOT AREA (BSA CORRECTED): 1.9
BH CV ECHO MEAS - AO ROOT AREA: 10.6 CM^2
BH CV ECHO MEAS - AO ROOT DIAM: 3.7 CM
BH CV ECHO MEAS - AO V2 MAX: 137.1 CM/SEC
BH CV ECHO MEAS - BSA(HAYCOCK): 2 M^2
BH CV ECHO MEAS - BSA: 2 M^2
BH CV ECHO MEAS - BZI_BMI: 26.6 KILOGRAMS/M^2
BH CV ECHO MEAS - BZI_METRIC_HEIGHT: 175.3 CM
BH CV ECHO MEAS - BZI_METRIC_WEIGHT: 81.6 KG
BH CV ECHO MEAS - EDV(MOD-SP4): 88 ML
BH CV ECHO MEAS - EDV(TEICH): 96 ML
BH CV ECHO MEAS - EF(CUBED): 64.5 %
BH CV ECHO MEAS - EF(MOD-BP): 58 %
BH CV ECHO MEAS - EF(MOD-SP4): 58 %
BH CV ECHO MEAS - EF(TEICH): 56.1 %
BH CV ECHO MEAS - ESV(MOD-SP4): 37 ML
BH CV ECHO MEAS - ESV(TEICH): 42.1 ML
BH CV ECHO MEAS - FS: 29.2 %
BH CV ECHO MEAS - IVS/LVPW: 1
BH CV ECHO MEAS - IVSD: 1.1 CM
BH CV ECHO MEAS - LAT PEAK E' VEL: 10 CM/SEC
BH CV ECHO MEAS - LV DIASTOLIC VOL/BSA (35-75): 44.6 ML/M^2
BH CV ECHO MEAS - LV MASS(C)D: 185.8 GRAMS
BH CV ECHO MEAS - LV MASS(C)DI: 94.1 GRAMS/M^2
BH CV ECHO MEAS - LV SYSTOLIC VOL/BSA (12-30): 18.7 ML/M^2
BH CV ECHO MEAS - LVIDD: 4.6 CM
BH CV ECHO MEAS - LVIDS: 3.2 CM
BH CV ECHO MEAS - LVLD AP4: 7.7 CM
BH CV ECHO MEAS - LVLS AP4: 5.9 CM
BH CV ECHO MEAS - LVPWD: 1.1 CM
BH CV ECHO MEAS - MED PEAK E' VEL: 7 CM/SEC
BH CV ECHO MEAS - MV A DUR: 0.12 SEC
BH CV ECHO MEAS - MV A MAX VEL: 83.1 CM/SEC
BH CV ECHO MEAS - MV DEC SLOPE: 514 CM/SEC^2
BH CV ECHO MEAS - MV DEC TIME: 0.17 SEC
BH CV ECHO MEAS - MV E MAX VEL: 78.4 CM/SEC
BH CV ECHO MEAS - MV E/A: 0.94
BH CV ECHO MEAS - MV MAX PG: 3.5 MMHG
BH CV ECHO MEAS - MV MEAN PG: 1.9 MMHG
BH CV ECHO MEAS - MV P1/2T MAX VEL: 95 CM/SEC
BH CV ECHO MEAS - MV P1/2T: 54.1 MSEC
BH CV ECHO MEAS - MV V2 MAX: 93.7 CM/SEC
BH CV ECHO MEAS - MV V2 MEAN: 64.2 CM/SEC
BH CV ECHO MEAS - MV V2 VTI: 22.9 CM
BH CV ECHO MEAS - MVA P1/2T LCG: 2.3 CM^2
BH CV ECHO MEAS - MVA(P1/2T): 4.1 CM^2
BH CV ECHO MEAS - PULM A REVS DUR: 0.09 SEC
BH CV ECHO MEAS - PULM A REVS VEL: 26.7 CM/SEC
BH CV ECHO MEAS - PULM DIAS VEL: 61.9 CM/SEC
BH CV ECHO MEAS - PULM S/D: 1.1
BH CV ECHO MEAS - PULM SYS VEL: 66.6 CM/SEC
BH CV ECHO MEAS - RAP SYSTOLE: 3 MMHG
BH CV ECHO MEAS - RVSP: 28 MMHG
BH CV ECHO MEAS - SI(CUBED): 31.2 ML/M^2
BH CV ECHO MEAS - SI(MOD-SP4): 25.8 ML/M^2
BH CV ECHO MEAS - SI(TEICH): 27.2 ML/M^2
BH CV ECHO MEAS - SUP REN AO DIAM: 1.8 CM
BH CV ECHO MEAS - SV(CUBED): 61.6 ML
BH CV ECHO MEAS - SV(MOD-SP4): 51 ML
BH CV ECHO MEAS - SV(TEICH): 53.8 ML
BH CV ECHO MEAS - TAPSE (>1.6): 2.2 CM2
BH CV ECHO MEAS - TR MAX VEL: 249.8 CM/SEC
BH CV ECHO MEASUREMENTS AVERAGE E/E' RATIO: 9.22
BH CV STRESS BP STAGE 1: NORMAL
BH CV STRESS BP STAGE 2: NORMAL
BH CV STRESS DURATION MIN STAGE 1: 3
BH CV STRESS DURATION MIN STAGE 2: 3
BH CV STRESS DURATION MIN STAGE 3: 1
BH CV STRESS DURATION SEC STAGE 1: 0
BH CV STRESS DURATION SEC STAGE 2: 0
BH CV STRESS DURATION SEC STAGE 3: 0
BH CV STRESS ECHO POST STRESS EJECTION FRACTION EF: 72 %
BH CV STRESS GRADE STAGE 1: 10
BH CV STRESS GRADE STAGE 2: 12
BH CV STRESS GRADE STAGE 3: 14
BH CV STRESS HR STAGE 1: 100
BH CV STRESS HR STAGE 2: 126
BH CV STRESS HR STAGE 3: 136
BH CV STRESS METS STAGE 1: 5
BH CV STRESS METS STAGE 2: 7.5
BH CV STRESS METS STAGE 3: 10
BH CV STRESS PROTOCOL 1: NORMAL
BH CV STRESS SPEED STAGE 1: 1.7
BH CV STRESS SPEED STAGE 2: 2.5
BH CV STRESS SPEED STAGE 3: 3.4
BH CV STRESS STAGE 1: 1
BH CV STRESS STAGE 2: 2
BH CV STRESS STAGE 3: 3
BH CV XLRA - RV BASE: 3.2 CM
BH CV XLRA - RV LENGTH: 6.6 CM
BH CV XLRA - RV MID: 2.8 CM
BH CV XLRA - TDI S': 15 CM/SEC
BH CV XLRA MEAS LEFT DIST CCA EDV: -19.9 CM/SEC
BH CV XLRA MEAS LEFT DIST CCA PSV: -79.7 CM/SEC
BH CV XLRA MEAS LEFT DIST ICA EDV: -22.7 CM/SEC
BH CV XLRA MEAS LEFT DIST ICA PSV: -67.6 CM/SEC
BH CV XLRA MEAS LEFT ICA/CCA RATIO: 1.2
BH CV XLRA MEAS LEFT MID CCA EDV: 23.4 CM/SEC
BH CV XLRA MEAS LEFT MID CCA PSV: 88.4 CM/SEC
BH CV XLRA MEAS LEFT MID ICA EDV: -21 CM/SEC
BH CV XLRA MEAS LEFT MID ICA PSV: -70 CM/SEC
BH CV XLRA MEAS LEFT PROX CCA EDV: 26 CM/SEC
BH CV XLRA MEAS LEFT PROX CCA PSV: 104.8 CM/SEC
BH CV XLRA MEAS LEFT PROX ECA PSV: -128.2 CM/SEC
BH CV XLRA MEAS LEFT PROX ICA EDV: -24.3 CM/SEC
BH CV XLRA MEAS LEFT PROX ICA PSV: -93.6 CM/SEC
BH CV XLRA MEAS LEFT PROX SCLA PSV: 98.3 CM/SEC
BH CV XLRA MEAS LEFT VERTEBRAL A PSV: -48.5 CM/SEC
BH CV XLRA MEAS RIGHT DIST CCA EDV: -17.4 CM/SEC
BH CV XLRA MEAS RIGHT DIST CCA PSV: -75.8 CM/SEC
BH CV XLRA MEAS RIGHT DIST ICA EDV: -19.2 CM/SEC
BH CV XLRA MEAS RIGHT DIST ICA PSV: -65.3 CM/SEC
BH CV XLRA MEAS RIGHT ICA/CCA RATIO: 1
BH CV XLRA MEAS RIGHT MID CCA EDV: -18.6 CM/SEC
BH CV XLRA MEAS RIGHT MID CCA PSV: -91.9 CM/SEC
BH CV XLRA MEAS RIGHT MID ICA EDV: -27.3 CM/SEC
BH CV XLRA MEAS RIGHT MID ICA PSV: -77.7 CM/SEC
BH CV XLRA MEAS RIGHT PROX CCA EDV: 18 CM/SEC
BH CV XLRA MEAS RIGHT PROX CCA PSV: 95.1 CM/SEC
BH CV XLRA MEAS RIGHT PROX ECA PSV: -125.6 CM/SEC
BH CV XLRA MEAS RIGHT PROX ICA EDV: -19.3 CM/SEC
BH CV XLRA MEAS RIGHT PROX ICA PSV: -52.2 CM/SEC
BH CV XLRA MEAS RIGHT PROX SCLA PSV: 61.3 CM/SEC
BH CV XLRA MEAS RIGHT VERTEBRAL A PSV: -44.2 CM/SEC
LEFT ATRIUM VOLUME INDEX: 28 ML/M2
LEFT ATRIUM VOLUME: 56 CM3
MAXIMAL PREDICTED HEART RATE: 145 BPM
PERCENT MAX PREDICTED HR: 93.79 %
STRESS BASELINE BP: NORMAL MMHG
STRESS BASELINE HR: 71 BPM
STRESS PERCENT HR: 110 %
STRESS POST ESTIMATED WORKLOAD: 8 METS
STRESS POST EXERCISE DUR MIN: 7 MIN
STRESS POST EXERCISE DUR SEC: 0 SEC
STRESS POST PEAK BP: NORMAL MMHG
STRESS POST PEAK HR: 136 BPM
STRESS TARGET HR: 123 BPM

## 2019-11-21 PROCEDURE — 93016 CV STRESS TEST SUPVJ ONLY: CPT | Performed by: INTERNAL MEDICINE

## 2019-11-21 PROCEDURE — 93350 STRESS TTE ONLY: CPT

## 2019-11-21 PROCEDURE — 93325 DOPPLER ECHO COLOR FLOW MAPG: CPT | Performed by: INTERNAL MEDICINE

## 2019-11-21 PROCEDURE — 93350 STRESS TTE ONLY: CPT | Performed by: INTERNAL MEDICINE

## 2019-11-21 PROCEDURE — 93880 EXTRACRANIAL BILAT STUDY: CPT | Performed by: INTERNAL MEDICINE

## 2019-11-21 PROCEDURE — 93018 CV STRESS TEST I&R ONLY: CPT | Performed by: INTERNAL MEDICINE

## 2019-11-21 PROCEDURE — 93017 CV STRESS TEST TRACING ONLY: CPT

## 2019-11-21 PROCEDURE — 93880 EXTRACRANIAL BILAT STUDY: CPT

## 2019-11-21 PROCEDURE — 93320 DOPPLER ECHO COMPLETE: CPT

## 2019-11-21 PROCEDURE — 93325 DOPPLER ECHO COLOR FLOW MAPG: CPT

## 2019-11-21 PROCEDURE — 93320 DOPPLER ECHO COMPLETE: CPT | Performed by: INTERNAL MEDICINE

## 2019-11-24 ENCOUNTER — TELEPHONE (OUTPATIENT)
Dept: CARDIOLOGY | Facility: CLINIC | Age: 74
End: 2019-11-24

## 2019-11-25 NOTE — TELEPHONE ENCOUNTER
Patient called office back given results. No questions as this time. To repeat carotid US and dopplers in 2 years    Margot Hill RN  [facility] Triage Nurse

## 2019-11-25 NOTE — TELEPHONE ENCOUNTER
Called and left a VM. Will go over results when pt calls us back.    Hayley Mcmullen, RN  Triage MG

## 2019-11-25 NOTE — TELEPHONE ENCOUNTER
Patient know that stress echocardiogram did not show any evidence of blockages his heart is strong there is some minor changes on the heart valves consistent with aging.  The ultrasound showed plaque in his neck vessels disease previously present) right greater than left.  Overall no major blockages.  On repeating the carotid ultrasound in 2 years as long as no other major symptoms.  Plan on repeating a carotid Doppler in 2 years.  Also aim to keep LDL less than 70, HDL greater than 40, triglycerides less than 150, glucose less than 100 and blood pressure less than 130/80 mmH. waqas

## 2019-11-26 DIAGNOSIS — I11.9 HYPERTENSION WITH HEART DISEASE: ICD-10-CM

## 2019-11-26 RX ORDER — ATORVASTATIN CALCIUM 20 MG/1
TABLET, FILM COATED ORAL
Qty: 90 TABLET | Refills: 0 | Status: SHIPPED | OUTPATIENT
Start: 2019-11-26 | End: 2020-03-10

## 2019-12-05 ENCOUNTER — TELEPHONE (OUTPATIENT)
Dept: CARDIOLOGY | Facility: CLINIC | Age: 74
End: 2019-12-05

## 2019-12-05 NOTE — TELEPHONE ENCOUNTER
----- Message from Janeth Chávez sent at 12/5/2019  8:50 AM EST -----  Please arrange one year follow up with [facility]D.      ----- Message -----  From: Juliann Lagos MD  Sent: 11/12/2019   9:36 PM  To: Janeth Chávez    With me  ----- Message -----  From: Janeth Chávez  Sent: 11/11/2019   3:40 PM  To: Juliann Lagos MD    Do  You want that with you or the APRN?    Thanks, Yoanna  ----- Message -----  From: Juliann Lagos MD  Sent: 11/10/2019  11:15 PM  To: Taya Schultz    Please arrange for follow-up in 1 year.

## 2019-12-26 DIAGNOSIS — I11.9 HYPERTENSION WITH HEART DISEASE: ICD-10-CM

## 2019-12-26 RX ORDER — RAMIPRIL 5 MG/1
CAPSULE ORAL
Qty: 90 CAPSULE | Refills: 2 | Status: SHIPPED | OUTPATIENT
Start: 2019-12-26 | End: 2020-10-07

## 2020-03-10 DIAGNOSIS — I11.9 HYPERTENSION WITH HEART DISEASE: ICD-10-CM

## 2020-03-10 RX ORDER — ATORVASTATIN CALCIUM 20 MG/1
TABLET, FILM COATED ORAL
Qty: 90 TABLET | Refills: 0 | Status: SHIPPED | OUTPATIENT
Start: 2020-03-10 | End: 2020-06-17

## 2020-06-10 DIAGNOSIS — I11.9 HYPERTENSION WITH HEART DISEASE: ICD-10-CM

## 2020-06-10 RX ORDER — ATORVASTATIN CALCIUM 20 MG/1
TABLET, FILM COATED ORAL
Qty: 90 TABLET | Refills: 0 | OUTPATIENT
Start: 2020-06-10

## 2020-06-14 DIAGNOSIS — I11.9 HYPERTENSION WITH HEART DISEASE: ICD-10-CM

## 2020-06-15 RX ORDER — ATORVASTATIN CALCIUM 20 MG/1
TABLET, FILM COATED ORAL
Qty: 90 TABLET | Refills: 0 | OUTPATIENT
Start: 2020-06-15

## 2020-06-17 DIAGNOSIS — I11.9 HYPERTENSION WITH HEART DISEASE: ICD-10-CM

## 2020-06-17 RX ORDER — ATORVASTATIN CALCIUM 20 MG/1
TABLET, FILM COATED ORAL
Qty: 90 TABLET | Refills: 1 | Status: SHIPPED | OUTPATIENT
Start: 2020-06-17 | End: 2020-12-30

## 2020-06-23 ENCOUNTER — TELEPHONE (OUTPATIENT)
Dept: INTERNAL MEDICINE | Facility: CLINIC | Age: 75
End: 2020-06-23

## 2020-06-23 NOTE — TELEPHONE ENCOUNTER
----- Message from SEPIDEH Jones sent at 6/20/2020  1:05 PM EDT -----  Regarding: needs apt  I am refilling the med request you sent me but he hasn't been seen since May 2019, please make him an appointment for follow up with me.

## 2020-10-07 DIAGNOSIS — I11.9 HYPERTENSION WITH HEART DISEASE: ICD-10-CM

## 2020-10-07 RX ORDER — RAMIPRIL 5 MG/1
CAPSULE ORAL
Qty: 90 CAPSULE | Refills: 1 | Status: SHIPPED | OUTPATIENT
Start: 2020-10-07 | End: 2021-04-01 | Stop reason: SDUPTHER

## 2020-12-29 DIAGNOSIS — I11.9 HYPERTENSION WITH HEART DISEASE: ICD-10-CM

## 2020-12-30 RX ORDER — ATORVASTATIN CALCIUM 20 MG/1
TABLET, FILM COATED ORAL
Qty: 90 TABLET | Refills: 0 | Status: SHIPPED | OUTPATIENT
Start: 2020-12-30 | End: 2021-04-01 | Stop reason: SDUPTHER

## 2021-03-09 ENCOUNTER — TELEPHONE (OUTPATIENT)
Dept: INTERNAL MEDICINE | Facility: CLINIC | Age: 76
End: 2021-03-09

## 2021-03-09 NOTE — TELEPHONE ENCOUNTER
Caller: Dennis Ryan    Relationship to patient: Self    Best call back number: [phone]  Patient is needing: LAB WORK SCHEDULED

## 2021-03-09 NOTE — TELEPHONE ENCOUNTER
Informed patient he needs an office visit he has not been seen since 2019. He stated there is nothing wrong but I stated to him that to continue medication refills he will need to be seen. At his request appointment has been made after lunch

## 2021-04-01 ENCOUNTER — OFFICE VISIT (OUTPATIENT)
Dept: INTERNAL MEDICINE | Facility: CLINIC | Age: 76
End: 2021-04-01

## 2021-04-01 VITALS
OXYGEN SATURATION: 98 % | BODY MASS INDEX: 27.4 KG/M2 | DIASTOLIC BLOOD PRESSURE: 84 MMHG | WEIGHT: 185 LBS | TEMPERATURE: 97.8 F | HEIGHT: 69 IN | RESPIRATION RATE: 17 BRPM | SYSTOLIC BLOOD PRESSURE: 166 MMHG | HEART RATE: 67 BPM

## 2021-04-01 DIAGNOSIS — I11.9 HYPERTENSION WITH HEART DISEASE: Primary | ICD-10-CM

## 2021-04-01 DIAGNOSIS — E78.2 MIXED HYPERLIPIDEMIA: ICD-10-CM

## 2021-04-01 DIAGNOSIS — I25.10 CORONARY ARTERY DISEASE INVOLVING NATIVE CORONARY ARTERY OF NATIVE HEART WITHOUT ANGINA PECTORIS: ICD-10-CM

## 2021-04-01 DIAGNOSIS — I10 ESSENTIAL HYPERTENSION: ICD-10-CM

## 2021-04-01 DIAGNOSIS — Z00.00 HEALTHCARE MAINTENANCE: ICD-10-CM

## 2021-04-01 PROCEDURE — 99214 OFFICE O/P EST MOD 30 MIN: CPT | Performed by: NURSE PRACTITIONER

## 2021-04-01 RX ORDER — RAMIPRIL 5 MG/1
5 CAPSULE ORAL DAILY
Qty: 90 CAPSULE | Refills: 1 | Status: SHIPPED | OUTPATIENT
Start: 2021-04-01 | End: 2021-10-01

## 2021-04-01 RX ORDER — ATORVASTATIN CALCIUM 20 MG/1
20 TABLET, FILM COATED ORAL DAILY
Qty: 90 TABLET | Refills: 1 | Status: SHIPPED | OUTPATIENT
Start: 2021-04-01 | End: 2021-10-01

## 2021-04-01 NOTE — ASSESSMENT & PLAN NOTE
He denies any recent cardiac symptoms.  I encouraged him to make an appointment for cardiac follow-up with his cardiologist, Dr. Lagos.  He acknowledges the importance of this and will call to make a follow-up appointment.  He will continue with 81 mg aspirin daily.

## 2021-04-01 NOTE — PATIENT INSTRUCTIONS
Please check the blood pressure at home 2-3 times weekly and let me know if it is consistently higher than goal of 130/80.    Please call for a follow-up appointment with Dr. Lagos, cardiology.

## 2021-04-01 NOTE — ASSESSMENT & PLAN NOTE
Blood pressure is elevated in the office today at 166/84.  I have asked him to begin consistently monitoring his blood pressure at home, goal of 130/80 or lower is discussed.  DASH diet as well as regular exercise, 20 to 30 minutes/day 4 to 5 days/week.  Continue ramipril 5 mg daily, refill is provided.  I have asked him to keep a log of daily home blood pressures and in 2 weeks to send me these readings.

## 2021-04-02 ENCOUNTER — TELEPHONE (OUTPATIENT)
Dept: INTERNAL MEDICINE | Facility: CLINIC | Age: 76
End: 2021-04-02

## 2021-04-02 LAB
ALBUMIN SERPL-MCNC: 4.5 G/DL (ref 3.5–5.2)
ALBUMIN/GLOB SERPL: 1.8 G/DL
ALP SERPL-CCNC: 68 U/L (ref 39–117)
ALT SERPL-CCNC: 27 U/L (ref 1–41)
AST SERPL-CCNC: 20 U/L (ref 1–40)
BILIRUB SERPL-MCNC: 0.7 MG/DL (ref 0–1.2)
BUN SERPL-MCNC: 16 MG/DL (ref 8–23)
BUN/CREAT SERPL: 19 (ref 7–25)
CALCIUM SERPL-MCNC: 9.1 MG/DL (ref 8.6–10.5)
CHLORIDE SERPL-SCNC: 105 MMOL/L (ref 98–107)
CHOLEST SERPL-MCNC: 143 MG/DL (ref 0–200)
CO2 SERPL-SCNC: 22.7 MMOL/L (ref 22–29)
CREAT SERPL-MCNC: 0.84 MG/DL (ref 0.76–1.27)
ERYTHROCYTE [DISTWIDTH] IN BLOOD BY AUTOMATED COUNT: 13.3 % (ref 12.3–15.4)
GLOBULIN SER CALC-MCNC: 2.5 GM/DL
GLUCOSE SERPL-MCNC: 92 MG/DL (ref 65–99)
HCT VFR BLD AUTO: 47.5 % (ref 37.5–51)
HCV AB S/CO SERPL IA: <0.1 S/CO RATIO (ref 0–0.9)
HDLC SERPL-MCNC: 48 MG/DL (ref 40–60)
HGB BLD-MCNC: 15.8 G/DL (ref 13–17.7)
LDLC SERPL CALC-MCNC: 81 MG/DL (ref 0–100)
MCH RBC QN AUTO: 27.7 PG (ref 26.6–33)
MCHC RBC AUTO-ENTMCNC: 33.3 G/DL (ref 31.5–35.7)
MCV RBC AUTO: 83.3 FL (ref 79–97)
PLATELET # BLD AUTO: 229 10*3/MM3 (ref 140–450)
POTASSIUM SERPL-SCNC: 4.5 MMOL/L (ref 3.5–5.2)
PROT SERPL-MCNC: 7 G/DL (ref 6–8.5)
RBC # BLD AUTO: 5.7 10*6/MM3 (ref 4.14–5.8)
SODIUM SERPL-SCNC: 140 MMOL/L (ref 136–145)
TRIGL SERPL-MCNC: 72 MG/DL (ref 0–150)
TSH SERPL DL<=0.005 MIU/L-ACNC: 1.96 UIU/ML (ref 0.27–4.2)
VLDLC SERPL CALC-MCNC: 14 MG/DL (ref 5–40)
WBC # BLD AUTO: 6.18 10*3/MM3 (ref 3.4–10.8)

## 2021-04-02 NOTE — PROGRESS NOTES
Please call pt-blood count is normal, no anemia, normal platelets and white blood cells.  Metabolic panel looks perfect including kidney function, liver enzymes and electrolytes.  Cholesterol panel looks good.  Thyroid labs are normal.

## 2021-04-02 NOTE — TELEPHONE ENCOUNTER
Caller: Dennis Ryan    Relationship to patient: Self    Best call back number: [phone]    Patient is needing: PATIENT RETURNED CALL TO [facility]. HUB ATTEMPTED TO WARM TRANSFER, UNSUCCESSFUL    PLEASE CALL PATIENT

## 2021-04-02 NOTE — TELEPHONE ENCOUNTER
----- Message from SEPIDEH Mathew sent at 4/2/2021  8:21 AM EDT -----  Please call pt-blood count is normal, no anemia, normal platelets and white blood cells.  Metabolic panel looks perfect including kidney function, liver enzymes and electrolytes.  Cholesterol panel looks good.  Thyroid labs are normal.

## 2021-04-05 ENCOUNTER — TELEPHONE (OUTPATIENT)
Dept: INTERNAL MEDICINE | Facility: CLINIC | Age: 76
End: 2021-04-05

## 2021-09-29 ENCOUNTER — OFFICE VISIT (OUTPATIENT)
Dept: CARDIOLOGY | Facility: CLINIC | Age: 76
End: 2021-09-29

## 2021-09-29 ENCOUNTER — TRANSCRIBE ORDERS (OUTPATIENT)
Dept: PHYSICAL THERAPY | Facility: CLINIC | Age: 76
End: 2021-09-29

## 2021-09-29 VITALS
DIASTOLIC BLOOD PRESSURE: 70 MMHG | BODY MASS INDEX: 27.88 KG/M2 | SYSTOLIC BLOOD PRESSURE: 140 MMHG | WEIGHT: 188.2 LBS | HEIGHT: 69 IN | HEART RATE: 69 BPM

## 2021-09-29 DIAGNOSIS — I65.23 CAROTID ARTERY PLAQUE, BILATERAL: ICD-10-CM

## 2021-09-29 DIAGNOSIS — I10 ESSENTIAL HYPERTENSION: Chronic | ICD-10-CM

## 2021-09-29 DIAGNOSIS — I49.3 PVC (PREMATURE VENTRICULAR CONTRACTION): Primary | ICD-10-CM

## 2021-09-29 DIAGNOSIS — I25.10 CORONARY ARTERY DISEASE INVOLVING NATIVE CORONARY ARTERY OF NATIVE HEART WITHOUT ANGINA PECTORIS: Primary | Chronic | ICD-10-CM

## 2021-09-29 DIAGNOSIS — I49.3 PVC'S (PREMATURE VENTRICULAR CONTRACTIONS): ICD-10-CM

## 2021-09-29 PROCEDURE — 93000 ELECTROCARDIOGRAM COMPLETE: CPT | Performed by: NURSE PRACTITIONER

## 2021-09-29 PROCEDURE — 99214 OFFICE O/P EST MOD 30 MIN: CPT | Performed by: NURSE PRACTITIONER

## 2021-09-29 NOTE — PROGRESS NOTES
Date of Office Visit: 2021  Encounter Provider: Viki Romero, KEMI, APRN  Place of Service: [facility] CARDIOLOGY  Patient Name: Dennis Ryan  :1945        Subjective:     Chief Complaint:  Coronary artery disease, carotid artery disease, hypertension      History of Present Illness:  Dennis Ryan is a 76 y.o. male patient of Dr. Lagos.  I am seeing this patient in the office today and I have reviewed his records.    Patient has a history of coronary artery disease, carotid artery disease, hypertension, hyperlipidemia.    Patient has a history of coronary artery disease diagnosed in  with modest disease of the LAD.  He has been followed intermittently since then.  Echo in  showed normal LV systolic function with LVH and mild mitral regurgitation.  He also wore an event monitor in  without evidence of arrhythmia.  He saw Dr. Ríos 2017 at which point stress test was recommended but patient deferred.  Stress echo and carotid ultrasound were ordered 2018 but he did not complete them.  He was seen by Dr. Lagos 2019 at which point he was walking 2 miles 5 days a week and he was having some intermittent palpitations.  Denied shortness of breath or chest discomfort.  Stress echo 2019 showed normal LV systolic function with EF of 58%, borderline concentric LVH, trace valvular regurgitation, mild anterior mitral valve leaflet thickening, normal RVSP.  No evidence of ischemia was noted on stress test or stress echo and he was able to exercise for 7 minutes at 8 METS.  Carotid ultrasound 2019 showed mild plaquing to the left CCA and bulb but no significant stenosis.  He had extensive complex plaque to the right carotid bulb but no significant stenosis.  Plan was to repeat carotid ultrasound in 2 years.  LDL goal less than 70.      Patient presents to office today for follow-up appointment.  Patient reports he is doing really well since last visit.   He retired March 2019 and since then he has gotten into a brisk walking routine.  He is walking 4 to 5 days a week and does a 20-minute mile and does 4 miles each time for a total of 80 minutes each time he goes out and walks at [employer].  He denies any chest pain or discomfort or shortness of breath symptoms.  He feels like his palpitations are much improved with a walking routine.  He might still get palpitations sporadically some days but other days he does not have any.  He has not noticed any tachycardia or sustained episodes.  Denies any edema, dizziness, syncope, near syncope, falls, fatigue, or abnormal bleeding.  Blood pressure little high in the office today, not monitoring at home.        Past Medical History:   Diagnosis Date   • Cardiovascular risk factor     No diabetes.  No hypertension.  Positive for hyperlipidemia.  Positive for family history of coronary artery disease with father with CAD age 60s, mother with heart problem at age 84, brother and sister x2 with hypertension   • Coronary artery disease    • Heart palpitations    • Hyperlipidemia    • Hypertension    • Kidney stone    • Left ventricular hypertrophy    • Palpitations      Past Surgical History:   Procedure Laterality Date   • APPENDECTOMY     • CORONARY ANGIOPLASTY WITH STENT PLACEMENT     • CYSTOSCOPY W/ URETERAL STENT PLACEMENT Left 6/17/2018    Procedure: CYSTOSCOPY, LEFT STENT PLACEMENT;  Surgeon: Jaydon Pereyra Jr., MD;  Location: [facility];  Service: Urology   • HERNIA REPAIR     • OTHER SURGICAL HISTORY      groin rupture    • PROSTATE SURGERY       Outpatient Medications Prior to Visit   Medication Sig Dispense Refill   • aspirin 81 MG EC tablet Take 162 mg by mouth Daily.     • atorvastatin (LIPITOR) 20 MG tablet Take 1 tablet by mouth Daily. 90 tablet 1   • ramipril (ALTACE) 5 MG capsule Take 1 capsule by mouth Daily. 90 capsule 1     No facility-administered medications prior to visit.       Allergies as of  "09/29/2021   • (No Known Allergies)     Social History     Socioeconomic History   • Marital status: Single     Spouse name: Not on file   • Number of children: Not on file   • Years of education: Not on file   • Highest education level: Not on file   Tobacco Use   • Smoking status: Former Smoker   • Smokeless tobacco: Never Used   Substance and Sexual Activity   • Alcohol use: No     Comment: No caffeine use   • Drug use: No     Family History   Problem Relation Age of Onset   • Other Mother         CARDIAC DISORDER   • Other Father    • Stroke Father         CEREBROVASCULAR   • Heart disease Father    • Diabetes Father    • Hypertension Other    • Cancer Sister        Review of Systems   Constitutional: Negative for malaise/fatigue.   Cardiovascular:        SEE HPI   Respiratory: Negative for shortness of breath.    Hematologic/Lymphatic: Negative for bleeding problem.   Musculoskeletal: Negative for falls.   Gastrointestinal: Negative for melena.   Genitourinary: Negative for hematuria.   Neurological: Negative for dizziness.   Psychiatric/Behavioral: Negative for altered mental status.          Objective:     Vitals:    09/29/21 1309   BP: 140/70   Pulse: 69   Weight: 85.4 kg (188 lb 3.2 oz)   Height: 175.3 cm (69\")     Body mass index is 27.79 kg/m².      PHYSICAL EXAM:  Constitutional:       General: Not in acute distress.     Appearance: Well-developed. Not diaphoretic.   Eyes:      Pupils: Pupils are equal, round, and reactive to light.   HENT:      Head: Normocephalic and atraumatic.   Neck:      Vascular: No JVD.   Pulmonary:      Effort: Pulmonary effort is normal. No respiratory distress.      Breath sounds: Normal breath sounds. No wheezing. No rales.   Cardiovascular:      Normal rate. Occasional ectopic beats. Regular rhythm.      Murmurs: There is no murmur.      No gallop. No click. No rub.   Edema:     Peripheral edema absent.   Abdominal:      General: Bowel sounds are normal. There is no " distension.      Palpations: Abdomen is soft.   Musculoskeletal: Normal range of motion.         General: No tenderness or deformity.      Cervical back: Neck supple. Skin:     General: Skin is warm and dry.      Findings: No erythema or rash.   Neurological:      Mental Status: Alert and oriented to person, place, and time.   Psychiatric:         Behavior: Behavior normal.         Judgment: Judgment normal.             ECG 12 Lead    Date/Time: 9/29/2021 1:20 PM  Performed by: Viki Romero DNP, APRN  Authorized by: Viki Romero DNP, SEPIDEH   Comparison: compared with previous ECG from 11/5/2019  Rhythm: sinus rhythm  Ectopy comments: PVCs  BPM: 69  Conduction: 1st degree AV block  Comments: PVCs present on EKG today.  Otherwise no significant changes from previous EKG.              Assessment:       Diagnosis Plan   1. Coronary artery disease involving native coronary artery of native heart without angina pectoris     2. PVC's (premature ventricular contractions)  Holter Monitor - 24 Hour   3. Carotid artery plaque, bilateral  Duplex Carotid Ultrasound CAR   4. Essential hypertension           Plan:     1. Hypertension: Blood pressure little high in the office today.  Patient monitor at home and call if staying greater than 130/80 on average.  2. PVCs: Noted on EKG today.  He reports a history of chronic intermittent palpitations but much improved with walking routine.  Will check 24-hour Holter based on results we will consider additional testing if indicated.  3. Bilateral carotid artery plaque: We will look at rechecking carotid ultrasounds.  Order placed.  LDL goal less than 70.  4. Coronary artery disease: On low-dose aspirin and statin.  With normal stress test in 2019.  Denies anginal symptoms.  5. Dyslipidemia: PCP managing.  On statin therapy.  LDL goal less than 70.    Patient follow-up with Dr. Lagos in 6 months or sooner if needed for any new, recurrent, or worsening symptoms or other issues or  concerns.  Discussed in detail signs/symptoms that warrant sooner call or follow-up to the office or ER visit.        Records reviewed including but not limited to 11/2019 stress echo, 11/2019 carotid ultrasound, 11/2019 EKG.           Your medication list          Accurate as of September 29, 2021  1:35 PM. If you have any questions, ask your nurse or doctor.            CONTINUE taking these medications      Instructions Last Dose Given Next Dose Due   aspirin 81 MG EC tablet      Take 162 mg by mouth Daily.       atorvastatin 20 MG tablet  Commonly known as: LIPITOR      Take 1 tablet by mouth Daily.       ramipril 5 MG capsule  Commonly known as: ALTACE      Take 1 capsule by mouth Daily.              The above medication changes may not have been made by this provider.  Patient's medication list was updated to reflect medications they are currently taking including medication changes made by other providers.            Thanks,    Viki Romero, DNP, APRN  09/29/2021         Dictated utilizing Dragon dictation

## 2021-10-01 DIAGNOSIS — E78.2 MIXED HYPERLIPIDEMIA: ICD-10-CM

## 2021-10-01 DIAGNOSIS — I11.9 HYPERTENSION WITH HEART DISEASE: ICD-10-CM

## 2021-10-01 RX ORDER — ATORVASTATIN CALCIUM 20 MG/1
TABLET, FILM COATED ORAL
Qty: 90 TABLET | Refills: 0 | Status: SHIPPED | OUTPATIENT
Start: 2021-10-01 | End: 2022-01-02

## 2021-10-01 RX ORDER — RAMIPRIL 5 MG/1
CAPSULE ORAL
Qty: 90 CAPSULE | Refills: 0 | Status: SHIPPED | OUTPATIENT
Start: 2021-10-01 | End: 2022-01-02

## 2021-10-14 ENCOUNTER — HOSPITAL ENCOUNTER (OUTPATIENT)
Dept: CARDIOLOGY | Facility: HOSPITAL | Age: 76
Discharge: HOME | End: 2021-10-14
Admitting: NURSE PRACTITIONER

## 2021-10-14 DIAGNOSIS — I65.23 CAROTID ARTERY PLAQUE, BILATERAL: ICD-10-CM

## 2021-10-14 PROCEDURE — 93880 EXTRACRANIAL BILAT STUDY: CPT

## 2021-10-14 PROCEDURE — 93880 EXTRACRANIAL BILAT STUDY: CPT | Performed by: INTERNAL MEDICINE

## 2021-10-18 LAB
BH CV XLRA MEAS LEFT DIST CCA EDV: -18.8 CM/SEC
BH CV XLRA MEAS LEFT DIST CCA PSV: -75.2 CM/SEC
BH CV XLRA MEAS LEFT DIST ICA EDV: -24.4 CM/SEC
BH CV XLRA MEAS LEFT DIST ICA PSV: -72.5 CM/SEC
BH CV XLRA MEAS LEFT ICA/CCA RATIO: 1
BH CV XLRA MEAS LEFT MID CCA EDV: -30.1 CM/SEC
BH CV XLRA MEAS LEFT MID CCA PSV: -116 CM/SEC
BH CV XLRA MEAS LEFT MID ICA EDV: -20.9 CM/SEC
BH CV XLRA MEAS LEFT MID ICA PSV: -66.9 CM/SEC
BH CV XLRA MEAS LEFT PROX ECA PSV: -127 CM/SEC
BH CV XLRA MEAS LEFT PROX ICA EDV: -21.7 CM/SEC
BH CV XLRA MEAS LEFT PROX ICA PSV: -70.1 CM/SEC
BH CV XLRA MEAS LEFT PROX SCLA PSV: 133 CM/SEC
BH CV XLRA MEAS LEFT VERTEBRAL A PSV: -36.5 CM/SEC
BH CV XLRA MEAS RIGHT DIST CCA EDV: -26.7 CM/SEC
BH CV XLRA MEAS RIGHT DIST CCA PSV: -87.8 CM/SEC
BH CV XLRA MEAS RIGHT DIST ICA EDV: -26.8 CM/SEC
BH CV XLRA MEAS RIGHT DIST ICA PSV: -64.8 CM/SEC
BH CV XLRA MEAS RIGHT ICA/CCA RATIO: 0.78
BH CV XLRA MEAS RIGHT MID CCA EDV: -26.7 CM/SEC
BH CV XLRA MEAS RIGHT MID CCA PSV: -90.9 CM/SEC
BH CV XLRA MEAS RIGHT MID ICA EDV: -21.7 CM/SEC
BH CV XLRA MEAS RIGHT MID ICA PSV: -69 CM/SEC
BH CV XLRA MEAS RIGHT PROX ECA PSV: -58.5 CM/SEC
BH CV XLRA MEAS RIGHT PROX ICA EDV: -18 CM/SEC
BH CV XLRA MEAS RIGHT PROX ICA PSV: -44.7 CM/SEC
BH CV XLRA MEAS RIGHT PROX SCLA PSV: 143 CM/SEC
BH CV XLRA MEAS RIGHT VERTEBRAL A PSV: -31.7 CM/SEC
MAXIMAL PREDICTED HEART RATE: 143 BPM
STRESS TARGET HR: 122 BPM

## 2021-10-19 ENCOUNTER — TELEPHONE (OUTPATIENT)
Dept: CARDIOLOGY | Facility: CLINIC | Age: 76
End: 2021-10-19

## 2021-10-19 NOTE — TELEPHONE ENCOUNTER
----- Message from Viki Romero, KEMI, APRN sent at 10/19/2021  8:37 AM EDT -----  Please let patient know that carotid ultrasounds continue to show some mild carotid plaque bilaterally.  No significant changes from 2019 study.  Would keep January follow-up with Dr. Lagos as scheduled or call sooner for issues or concerns.  Would follow-up with PCP to ensure cholesterol has improved, want LDL less than 70.  Also work on healthy diet and exercise and lifestyle.

## 2022-01-02 DIAGNOSIS — I11.9 HYPERTENSION WITH HEART DISEASE: ICD-10-CM

## 2022-01-02 DIAGNOSIS — E78.2 MIXED HYPERLIPIDEMIA: ICD-10-CM

## 2022-01-02 RX ORDER — RAMIPRIL 5 MG/1
CAPSULE ORAL
Qty: 90 CAPSULE | Refills: 0 | Status: SHIPPED | OUTPATIENT
Start: 2022-01-02 | End: 2022-01-27 | Stop reason: SDUPTHER

## 2022-01-02 RX ORDER — ATORVASTATIN CALCIUM 20 MG/1
TABLET, FILM COATED ORAL
Qty: 90 TABLET | Refills: 0 | Status: SHIPPED | OUTPATIENT
Start: 2022-01-02 | End: 2022-04-04

## 2022-01-27 ENCOUNTER — OFFICE VISIT (OUTPATIENT)
Dept: CARDIOLOGY | Facility: CLINIC | Age: 77
End: 2022-01-27

## 2022-01-27 VITALS
HEART RATE: 61 BPM | HEIGHT: 69 IN | SYSTOLIC BLOOD PRESSURE: 150 MMHG | BODY MASS INDEX: 27.85 KG/M2 | WEIGHT: 188 LBS | DIASTOLIC BLOOD PRESSURE: 94 MMHG

## 2022-01-27 DIAGNOSIS — E78.2 MIXED HYPERLIPIDEMIA: Chronic | ICD-10-CM

## 2022-01-27 DIAGNOSIS — I11.9 HYPERTENSION WITH HEART DISEASE: ICD-10-CM

## 2022-01-27 DIAGNOSIS — I25.10 CORONARY ARTERY DISEASE INVOLVING NATIVE CORONARY ARTERY OF NATIVE HEART WITHOUT ANGINA PECTORIS: Primary | ICD-10-CM

## 2022-01-27 DIAGNOSIS — I51.7 LEFT VENTRICULAR HYPERTROPHY: ICD-10-CM

## 2022-01-27 DIAGNOSIS — I10 ESSENTIAL HYPERTENSION: ICD-10-CM

## 2022-01-27 PROCEDURE — 93000 ELECTROCARDIOGRAM COMPLETE: CPT | Performed by: INTERNAL MEDICINE

## 2022-01-27 PROCEDURE — 99213 OFFICE O/P EST LOW 20 MIN: CPT | Performed by: INTERNAL MEDICINE

## 2022-01-27 RX ORDER — RAMIPRIL 10 MG/1
CAPSULE ORAL
Qty: 90 CAPSULE | Refills: 3 | Status: SHIPPED | OUTPATIENT
Start: 2022-01-27 | End: 2022-04-06 | Stop reason: SDUPTHER

## 2022-02-22 ENCOUNTER — OFFICE VISIT (OUTPATIENT)
Dept: INTERNAL MEDICINE | Facility: CLINIC | Age: 77
End: 2022-02-22

## 2022-02-22 VITALS
HEIGHT: 69 IN | OXYGEN SATURATION: 99 % | DIASTOLIC BLOOD PRESSURE: 82 MMHG | SYSTOLIC BLOOD PRESSURE: 138 MMHG | BODY MASS INDEX: 27.99 KG/M2 | TEMPERATURE: 97.8 F | HEART RATE: 71 BPM | WEIGHT: 189 LBS

## 2022-02-22 DIAGNOSIS — H66.90 ACUTE OTITIS MEDIA, UNSPECIFIED OTITIS MEDIA TYPE: Primary | ICD-10-CM

## 2022-02-22 PROCEDURE — 99213 OFFICE O/P EST LOW 20 MIN: CPT | Performed by: NURSE PRACTITIONER

## 2022-02-22 RX ORDER — AMOXICILLIN AND CLAVULANATE POTASSIUM 875; 125 MG/1; MG/1
1 TABLET, FILM COATED ORAL 2 TIMES DAILY
Qty: 20 TABLET | Refills: 0 | Status: SHIPPED | OUTPATIENT
Start: 2022-02-22 | End: 2022-03-04

## 2022-02-22 NOTE — PROGRESS NOTES
"Chief Complaint  Ear Problem    Subjective          Dennis Ryan presents to [facility]  History of Present Illness    Patient is a pleasant 76-year-old male who typically sees SEPIDEH Mathew here in the office.  Patient is new to me and he presents today with complaints of left ear discomfort x4 days. Patient c/o \"water in L ear x 4 days\"  Home remedies nothing else is working.  Denies chills, fever, or pain at this time.  Patient has not been using any Tylenol or ibuprofen at this time.  No other complaints on today's office visit.    Objective   Vital Signs:   /82 (BP Location: Left arm, Patient Position: Sitting, Cuff Size: Adult)   Pulse 71   Temp 97.8 °F (36.6 °C)   Ht 175.3 cm (69\")   Wt 85.7 kg (189 lb)   SpO2 99%   BMI 27.91 kg/m²     Physical Exam  Vitals and nursing note reviewed.   Constitutional:       Appearance: Normal appearance.   HENT:      Head: Normocephalic.      Right Ear: Tympanic membrane, ear canal and external ear normal. There is no impacted cerumen.      Left Ear: External ear normal. There is no impacted cerumen.      Ears:      Comments: Redness/swelling noted to TM on the Left side  Denies pain     Nose: Nose normal.      Mouth/Throat:      Mouth: Mucous membranes are moist.   Eyes:      Pupils: Pupils are equal, round, and reactive to light.   Cardiovascular:      Rate and Rhythm: Normal rate.      Pulses: Normal pulses.      Comments: No peripheral edema noted.   Pulmonary:      Effort: Pulmonary effort is normal. No respiratory distress.      Breath sounds: Normal breath sounds. No stridor. No wheezing, rhonchi or rales.   Chest:      Chest wall: No tenderness.   Musculoskeletal:         General: Normal range of motion.      Cervical back: Normal range of motion.   Skin:     General: Skin is warm.      Capillary Refill: Capillary refill takes less than 2 seconds.   Neurological:      Mental Status: He is alert and oriented to " person, place, and time.   Psychiatric:         Behavior: Behavior normal.        Result Review :            Office Visit with Shy Baltazar APRN (04/01/2021)  CBC No Differential (04/01/2021 1:29 PM)  Comprehensive metabolic panel (04/01/2021 1:29 PM)       Assessment and Plan    Diagnoses and all orders for this visit:    1. Acute otitis media, unspecified otitis media type (Primary)    Other orders  -     amoxicillin-clavulanate (Augmentin) 875-125 MG per tablet; Take 1 tablet by mouth 2 (Two) Times a Day for 10 days.  Dispense: 20 tablet; Refill: 0    Patient will stop at the pharmacy and  his prescription and take as directed for his acute otitis media of the left ear.  He will also take Tylenol or ibuprofen as needed for any pain or discomfort he may experience.  Patient can also take a daily allergy tablet along with Mucinex as needed for any signs and symptoms of congestion.  Patient will come back in 2 weeks for a recheck with Shy.  Patient verbalizes treatment plan at this time.  If patient develops any worsening symptoms such as chills, fever, or worsening pain he knows to contact the office immediately.      Follow Up   Return in about 2 weeks (around 3/8/2022) for Recheck.  Patient was given instructions and counseling regarding his condition or for health maintenance advice. Please see specific information pulled into the AVS if appropriate.

## 2022-02-22 NOTE — PATIENT INSTRUCTIONS
I would take a daily Claritin tablet   Mucinex (plain/blue kind) daily or as needed for any signs/symptoms of fluid   prescription of Augmentin and take one pill by mouth twice daily for 10 days.   Otitis Media, Adult    Otitis media is a condition in which the middle ear is red and swollen (inflamed) and full of fluid. The middle ear is the part of the ear that contains bones for hearing as well as air that helps send sounds to the brain. The condition usually goes away on its own.  What are the causes?  This condition is caused by a blockage in the eustachian tube. The eustachian tube connects the middle ear to the back of the nose. It normally allows air into the middle ear. The blockage is caused by fluid or swelling. Problems that can cause blockage include:  · A cold or infection that affects the nose, mouth, or throat.  · Allergies.  · An irritant, such as tobacco smoke.  · Adenoids that have become large. The adenoids are soft tissue located in the back of the throat, behind the nose and the roof of the mouth.  · Growth or swelling in the upper part of the throat, just behind the nose (nasopharynx).  · Damage to the ear caused by change in pressure. This is called barotrauma.  What are the signs or symptoms?  Symptoms of this condition include:  · Ear pain.  · Fever.  · Problems with hearing.  · Being tired.  · Fluid leaking from the ear.  · Ringing in the ear.  How is this treated?  This condition can go away on its own within 3-5 days. But if the condition is caused by bacteria or does not go away on its own, or if it keeps coming back, your doctor may:  · Give you antibiotic medicines.  · Give you medicines for pain.  Follow these instructions at home:  · Take over-the-counter and prescription medicines only as told by your doctor.  · If you were prescribed an antibiotic medicine, take it as told by your doctor. Do not stop taking the antibiotic even if you start to feel better.  · Keep all  follow-up visits as told by your doctor. This is important.  Contact a doctor if:  · You have bleeding from your nose.  · There is a lump on your neck.  · You are not feeling better in 5 days.  · You feel worse instead of better.  Get help right away if:  · You have pain that is not helped with medicine.  · You have swelling, redness, or pain around your ear.  · You get a stiff neck.  · You cannot move part of your face (paralysis).  · You notice that the bone behind your ear hurts when you touch it.  · You get a very bad headache.  Summary  · Otitis media means that the middle ear is red, swollen, and full of fluid.  · This condition usually goes away on its own.  · If the problem does not go away, treatment may be needed. You may be given medicines to treat the infection or to treat your pain.  · If you were prescribed an antibiotic medicine, take it as told by your doctor. Do not stop taking the antibiotic even if you start to feel better.  · Keep all follow-up visits as told by your doctor. This is important.  This information is not intended to replace advice given to you by your health care provider. Make sure you discuss any questions you have with your health care provider.  Document Revised: 11/19/2020 Document Reviewed: 11/19/2020  Elsevier Patient Education © 2021 [employer]

## 2022-02-25 ENCOUNTER — OFFICE VISIT (OUTPATIENT)
Dept: INTERNAL MEDICINE | Facility: CLINIC | Age: 77
End: 2022-02-25

## 2022-02-25 VITALS
HEIGHT: 69 IN | BODY MASS INDEX: 28.61 KG/M2 | TEMPERATURE: 97.8 F | WEIGHT: 193.2 LBS | HEART RATE: 113 BPM | DIASTOLIC BLOOD PRESSURE: 90 MMHG | SYSTOLIC BLOOD PRESSURE: 145 MMHG | OXYGEN SATURATION: 99 %

## 2022-02-25 DIAGNOSIS — H65.92 LEFT OTITIS MEDIA WITH EFFUSION: Primary | ICD-10-CM

## 2022-02-25 PROCEDURE — 99213 OFFICE O/P EST LOW 20 MIN: CPT | Performed by: NURSE PRACTITIONER

## 2022-02-25 RX ORDER — FLUTICASONE PROPIONATE 50 MCG
2 SPRAY, SUSPENSION (ML) NASAL DAILY
Qty: 15.8 ML | Refills: 1 | Status: SHIPPED | OUTPATIENT
Start: 2022-02-25 | End: 2022-11-29

## 2022-02-25 NOTE — PROGRESS NOTES
"Chief Complaint  Follow-up (F/u on ear infection.) and Hypertension    Subjective          Dennis Ryan presents to [facility]  History of Present Illness  This is a 77 y/o male presenting to office with complaints of f/u with ear infection. Patient reports he was diagnosed with left ear infection. Patient reports taking augmentin-- he started this on 2/22/22. Patient reports he noticed some blood on his wash cloth this morning after cleaning the exterior of his ear. Patient reports he is not experiencing any pain at this time. Patient reports he has not used any q-tips or stuck any items in his ear.     Objective   Vital Signs:   /90 (BP Location: Left arm, Patient Position: Sitting, Cuff Size: Adult)   Pulse 113   Temp 97.8 °F (36.6 °C) (Temporal)   Ht 175.3 cm (69.02\")   Wt 87.6 kg (193 lb 3.2 oz)   SpO2 99%   BMI 28.52 kg/m²     Physical Exam  Constitutional:       Appearance: Normal appearance. He is normal weight.   HENT:      Head: Normocephalic.      Right Ear: Hearing, tympanic membrane, ear canal and external ear normal.      Left Ear: Tenderness present. A middle ear effusion is present. Tympanic membrane is erythematous.      Nose: Nose normal.      Mouth/Throat:      Mouth: Mucous membranes are moist.   Eyes:      Extraocular Movements: Extraocular movements intact.      Conjunctiva/sclera: Conjunctivae normal.      Pupils: Pupils are equal, round, and reactive to light.   Cardiovascular:      Rate and Rhythm: Normal rate and regular rhythm.      Pulses: Normal pulses.      Heart sounds: Normal heart sounds. No murmur heard.  No friction rub. No gallop.    Pulmonary:      Effort: Pulmonary effort is normal. No respiratory distress.      Breath sounds: Normal breath sounds. No stridor. No wheezing, rhonchi or rales.   Musculoskeletal:         General: No swelling or deformity. Normal range of motion.   Skin:     General: Skin is warm and dry.      Capillary " Refill: Capillary refill takes less than 2 seconds.   Neurological:      General: No focal deficit present.      Mental Status: He is alert and oriented to person, place, and time. Mental status is at baseline.   Psychiatric:         Mood and Affect: Mood normal.         Behavior: Behavior normal.         Thought Content: Thought content normal.         Judgment: Judgment normal.        Result Review :   The following data was reviewed by: SEPIDEH Kang on 02/25/2022:  Common labs    Common Labsle 4/1/21 4/1/21 4/1/21    1329 1329 1329   Glucose  92    BUN  16    Creatinine  0.84    eGFR Non  Am  89    eGFR African Am  108    Sodium  140    Potassium  4.5    Chloride  105    Calcium  9.1    Total Protein  7.0    Albumin  4.50    Total Bilirubin  0.7    Alkaline Phosphatase  68    AST (SGOT)  20    ALT (SGPT)  27    WBC 6.18     Hemoglobin 15.8     Hematocrit 47.5     Platelets 229     Total Cholesterol   143   Triglycerides   72   HDL Cholesterol   48   LDL Cholesterol    81      Comments are available for some flowsheets but are not being displayed.                Assessment and Plan    Diagnoses and all orders for this visit:    1. Left otitis media with effusion (Primary)  Assessment & Plan:  Continue on augmentin.   Flonase 1 spray each nostril daily x 10 days.   Continue to avoid q-tip use.     Orders:  -     fluticasone (Flonase) 50 MCG/ACT nasal spray; 2 sprays into the nostril(s) as directed by provider Daily.  Dispense: 15.8 mL; Refill: 1      Follow Up   Return if symptoms worsen or fail to improve.  Patient was given instructions and counseling regarding his condition or for health maintenance advice. Please see specific information pulled into the AVS if appropriate.

## 2022-02-25 NOTE — ASSESSMENT & PLAN NOTE
Continue on augmentin.   Flonase 1 spray each nostril daily x 10 days.   Continue to avoid q-tip use.

## 2022-03-08 ENCOUNTER — OFFICE VISIT (OUTPATIENT)
Dept: INTERNAL MEDICINE | Facility: CLINIC | Age: 77
End: 2022-03-08

## 2022-03-08 VITALS
SYSTOLIC BLOOD PRESSURE: 138 MMHG | BODY MASS INDEX: 28.58 KG/M2 | TEMPERATURE: 97.5 F | OXYGEN SATURATION: 99 % | HEIGHT: 69 IN | DIASTOLIC BLOOD PRESSURE: 86 MMHG | HEART RATE: 65 BPM | WEIGHT: 193 LBS

## 2022-03-08 DIAGNOSIS — H65.92 MEE (MIDDLE EAR EFFUSION), LEFT: Primary | ICD-10-CM

## 2022-03-08 PROCEDURE — 99213 OFFICE O/P EST LOW 20 MIN: CPT | Performed by: NURSE PRACTITIONER

## 2022-03-08 NOTE — PROGRESS NOTES
"Chief Complaint  Middle Ear effusion    Subjective          Dennis Ryan presents to [facility]  Patient presents for 2 week follow up on otitis media. This is a 76 YOM. He was seen by SEPIDEH Parekh on 2/22/22 and prescribed Augmentin for a left OM, then seen by SEPIDEH Kang on 2/25/22 for persistent symptoms who added Flonase daily. He states that he has never had any pain associated with this, but has now finished the antibiotic and is having persistent left plugged ear sensation, sensation of fluid shift and \"popping\" in the ear with changes in head position, and 50% decreased hearing of the left ear. Right ear has remained unaffected. He finished out the Augmentin and has been using the Flonase daily consisently. No headaches, vision changes or associated congestion/cough. No drainage. Symptoms began 2/17/22. Denies development of other new issues today.      Objective   Vital Signs:   /86 (BP Location: Left arm, Patient Position: Sitting, Cuff Size: Adult)   Pulse 65   Temp 97.5 °F (36.4 °C)   Ht 175.3 cm (69\")   Wt 87.5 kg (193 lb) Comment: full dressed  SpO2 99%   BMI 28.50 kg/m²     Physical Exam  Vitals and nursing note reviewed.   Constitutional:       General: He is not in acute distress.     Appearance: Normal appearance. He is well-developed. He is not ill-appearing, toxic-appearing or diaphoretic.   HENT:      Head: Normocephalic and atraumatic.      Right Ear: Tympanic membrane, ear canal and external ear normal. There is no impacted cerumen.      Left Ear: Ear canal and external ear normal. A middle ear effusion is present. There is no impacted cerumen. Tympanic membrane is erythematous. Tympanic membrane is not retracted or bulging.   Eyes:      Pupils: Pupils are equal, round, and reactive to light.   Cardiovascular:      Rate and Rhythm: Normal rate and regular rhythm.      Pulses: Normal pulses.      Heart sounds: Normal heart sounds. "   Pulmonary:      Effort: Pulmonary effort is normal.      Breath sounds: Normal breath sounds.   Abdominal:      Palpations: Abdomen is soft.   Musculoskeletal:         General: Normal range of motion.      Cervical back: Normal range of motion and neck supple.   Skin:     General: Skin is warm and dry.      Capillary Refill: Capillary refill takes less than 2 seconds.   Neurological:      General: No focal deficit present.      Mental Status: He is alert and oriented to person, place, and time. Mental status is at baseline.   Psychiatric:         Mood and Affect: Mood normal.         Behavior: Behavior normal.         Thought Content: Thought content normal.         Judgment: Judgment normal.        Result Review :   The following data was reviewed by: SEPIDEH Mathew on 03/08/2022:  Common labs    Common Labsle 4/1/21 4/1/21 4/1/21    1329 1329 1329   Glucose  92    BUN  16    Creatinine  0.84    eGFR Non  Am  89    eGFR African Am  108    Sodium  140    Potassium  4.5    Chloride  105    Calcium  9.1    Total Protein  7.0    Albumin  4.50    Total Bilirubin  0.7    Alkaline Phosphatase  68    AST (SGOT)  20    ALT (SGPT)  27    WBC 6.18     Hemoglobin 15.8     Hematocrit 47.5     Platelets 229     Total Cholesterol   143   Triglycerides   72   HDL Cholesterol   48   LDL Cholesterol    81      Comments are available for some flowsheets but are not being displayed.           Office Visit with Sarita Hugo APRN (02/25/2022)  Office Visit with Landy Louis APRN (02/22/2022)    Current outpatient and discharge medications have been reconciled for the patient.  Reviewed by: SEPIDEH Mathew           Assessment and Plan    Diagnoses and all orders for this visit:    1. ADALID (middle ear effusion), left (Primary)  -     Ambulatory Referral to ENT (Otolaryngology)      Continue Flonase, add daily Xyzal. Referral to ENT placed for further evaluation of persistent left middle ear effusion and  decreased hearing associated.     Follow up PRN and in about 4 months for AWV.    Follow Up   Return in about 4 months (around 7/8/2022).  Patient was given instructions and counseling regarding his condition or for health maintenance advice. Please see specific information pulled into the AVS if appropriate.

## 2022-03-08 NOTE — PATIENT INSTRUCTIONS
Over the counter please start Xyzal once nightly as well as continue with Flonase in the mornings.

## 2022-03-09 ENCOUNTER — TELEPHONE (OUTPATIENT)
Dept: INTERNAL MEDICINE | Facility: CLINIC | Age: 77
End: 2022-03-09

## 2022-03-09 NOTE — TELEPHONE ENCOUNTER
Caller: Dennis Ryan    Relationship: Self    Best call back number: [phone]    What was the call regarding: PATIENT CALLED STATING THAT ENT CAN NOT GET HIM IN UNTIL NEXT WEEK. PLEASE ADVISE IF THERE IS AN ENT HE CAN SEE THIS WEEK, HE IS SCARED ABOUT RE-INFECTION.     Do you require a callback: YES

## 2022-03-09 NOTE — TELEPHONE ENCOUNTER
I do not think re-infection will be an issue based on what his ears looked like on exam yesterday. I asked Mariano to look for the first available provider to see him, possibly ask for cancellation list?

## 2022-04-04 DIAGNOSIS — E78.2 MIXED HYPERLIPIDEMIA: ICD-10-CM

## 2022-04-04 RX ORDER — ATORVASTATIN CALCIUM 20 MG/1
TABLET, FILM COATED ORAL
Qty: 90 TABLET | Refills: 0 | Status: SHIPPED | OUTPATIENT
Start: 2022-04-04 | End: 2022-07-05

## 2022-04-04 RX ORDER — RAMIPRIL 5 MG/1
CAPSULE ORAL
Qty: 90 CAPSULE | Refills: 1 | OUTPATIENT
Start: 2022-04-04

## 2022-04-05 RX ORDER — RAMIPRIL 5 MG/1
CAPSULE ORAL
Qty: 90 CAPSULE | OUTPATIENT
Start: 2022-04-05

## 2022-04-06 DIAGNOSIS — I11.9 HYPERTENSION WITH HEART DISEASE: ICD-10-CM

## 2022-04-06 RX ORDER — RAMIPRIL 10 MG/1
CAPSULE ORAL
Qty: 90 CAPSULE | Refills: 1 | Status: SHIPPED | OUTPATIENT
Start: 2022-04-06 | End: 2022-04-07 | Stop reason: SINTOL

## 2022-04-06 NOTE — TELEPHONE ENCOUNTER
Caller: Dennis Ryan    Relationship: Self    Best call back number: [phone]    Requested Prescriptions:   Requested Prescriptions     Pending Prescriptions Disp Refills   • ramipril (ALTACE) 10 MG capsule 90 capsule 3     Sig: Take one a day        Pharmacy where request should be sent: [facility] [address] [place] [address] [address] [facility] [address]  - [phone]  - [phone] FX     Additional details provided by patient: PATIENT HAS 2 DOSES LEFT. PATIENT STATED THE 10 MG CONSTIPATES HIM AND HE WOULD LIKE TO GO BACK TO THE 5MG. PLEASE CALL PATIENT TO DISCUSS CHANGE BACK.     Does the patient have less than a 3 day supply:  [x] Yes  [] No    Heidy Blevins Rep   04/06/22 09:38 EDT

## 2022-04-07 DIAGNOSIS — I11.9 HYPERTENSION WITH HEART DISEASE: Primary | ICD-10-CM

## 2022-04-07 DIAGNOSIS — I10 ESSENTIAL HYPERTENSION: ICD-10-CM

## 2022-04-07 RX ORDER — RAMIPRIL 5 MG/1
CAPSULE ORAL
Qty: 90 CAPSULE | OUTPATIENT
Start: 2022-04-07

## 2022-04-07 RX ORDER — RAMIPRIL 5 MG/1
5 CAPSULE ORAL DAILY
Qty: 90 CAPSULE | Refills: 1 | Status: SHIPPED | OUTPATIENT
Start: 2022-04-07 | End: 2022-10-03

## 2022-07-02 DIAGNOSIS — E78.2 MIXED HYPERLIPIDEMIA: ICD-10-CM

## 2022-07-05 RX ORDER — ATORVASTATIN CALCIUM 20 MG/1
TABLET, FILM COATED ORAL
Qty: 90 TABLET | Refills: 0 | Status: SHIPPED | OUTPATIENT
Start: 2022-07-05 | End: 2022-10-03

## 2022-10-02 DIAGNOSIS — I10 ESSENTIAL HYPERTENSION: ICD-10-CM

## 2022-10-02 DIAGNOSIS — E78.2 MIXED HYPERLIPIDEMIA: ICD-10-CM

## 2022-10-03 RX ORDER — RAMIPRIL 5 MG/1
CAPSULE ORAL
Qty: 90 CAPSULE | Refills: 0 | Status: SHIPPED | OUTPATIENT
Start: 2022-10-03 | End: 2022-12-30 | Stop reason: SDUPTHER

## 2022-10-03 RX ORDER — ATORVASTATIN CALCIUM 20 MG/1
TABLET, FILM COATED ORAL
Qty: 90 TABLET | Refills: 0 | Status: SHIPPED | OUTPATIENT
Start: 2022-10-03 | End: 2022-12-30 | Stop reason: SDUPTHER

## 2022-11-29 ENCOUNTER — OFFICE VISIT (OUTPATIENT)
Dept: INTERNAL MEDICINE | Facility: CLINIC | Age: 77
End: 2022-11-29

## 2022-11-29 VITALS
WEIGHT: 192.4 LBS | HEIGHT: 69 IN | HEART RATE: 70 BPM | DIASTOLIC BLOOD PRESSURE: 80 MMHG | SYSTOLIC BLOOD PRESSURE: 122 MMHG | TEMPERATURE: 96.9 F | OXYGEN SATURATION: 97 % | BODY MASS INDEX: 28.5 KG/M2

## 2022-11-29 DIAGNOSIS — E78.2 MIXED HYPERLIPIDEMIA: Chronic | ICD-10-CM

## 2022-11-29 DIAGNOSIS — I65.23 BILATERAL CAROTID ARTERY STENOSIS: Primary | ICD-10-CM

## 2022-11-29 DIAGNOSIS — I25.10 CORONARY ARTERY DISEASE INVOLVING NATIVE CORONARY ARTERY OF NATIVE HEART WITHOUT ANGINA PECTORIS: Chronic | ICD-10-CM

## 2022-11-29 DIAGNOSIS — I83.91 VARICOSE VEINS OF RIGHT LOWER EXTREMITY, UNSPECIFIED WHETHER COMPLICATED: Chronic | ICD-10-CM

## 2022-11-29 DIAGNOSIS — I11.9 HYPERTENSION WITH HEART DISEASE: ICD-10-CM

## 2022-11-29 PROBLEM — H65.92 LEFT OTITIS MEDIA WITH EFFUSION: Status: RESOLVED | Noted: 2022-02-25 | Resolved: 2022-11-29

## 2022-11-29 PROBLEM — I65.29 STENOSIS OF CAROTID ARTERY: Chronic | Status: ACTIVE | Noted: 2018-06-23

## 2022-11-29 PROCEDURE — 99214 OFFICE O/P EST MOD 30 MIN: CPT | Performed by: NURSE PRACTITIONER

## 2022-11-29 NOTE — PROGRESS NOTES
Chief Complaint  Hypertension (Establish care)     Subjective:      History of Present Illness {CC  Problem List  Visit  Diagnosis   Encounters  Notes  Medications  Labs  Result Review Imaging  Media :23}     Dennis Ryan presents to [facility] for:  Hypertension     He is a former patient of A Giovanny who is leaving this practice and here to establish with me.     He chronically has hypertension, hyperlipidemia, CAD (2003: LAD 50% - chart states had stent but looking at procedure note: no stent was placed), mild bl carotid plaque, hx kidney stones (left ESWL 2018), varicose vein in right (wears compression sock right for over 20 years: Dr Hernandez in the past), BPH     He states he feels he is doing great for his age.     He states urology checks his PSA: and he has routine follow up there once a year.     Retired  from [employer].   No children.     Avid reader.   Exercise: brisk walks 4 times a week. No SOA, CP     I have reviewed patient's medical history, any new submitted information provided by patient or medical assistant and updated medical record.      Objective:      Physical Exam  Vitals reviewed.   Constitutional:       Appearance: Normal appearance. He is well-developed.   HENT:      Head:      Comments: Wearing mask due to COVID   Neck:      Thyroid: No thyromegaly.   Cardiovascular:      Rate and Rhythm: Normal rate and regular rhythm.      Pulses: Normal pulses.      Heart sounds: Normal heart sounds.   Pulmonary:      Effort: Pulmonary effort is normal.      Breath sounds: Normal breath sounds.      Comments: E/U   Abdominal:      General: Bowel sounds are normal.      Palpations: Abdomen is soft.   Musculoskeletal:         General: Normal range of motion.      Cervical back: Normal range of motion and neck supple.      Comments: Compression sock R lower extremity    Lymphadenopathy:      Cervical: No cervical adenopathy.   Skin:      "General: Skin is warm and dry.      Capillary Refill: Capillary refill takes 2 to 3 seconds.   Neurological:      Mental Status: He is alert and oriented to person, place, and time.   Psychiatric:         Mood and Affect: Mood normal.         Behavior: Behavior normal. Behavior is cooperative.         Thought Content: Thought content normal.         Judgment: Judgment normal.        Result Review  Data Reviewed:{ Labs  Result Review  Imaging  Med Tab  Media :23}                Vital Signs:   /80 (BP Location: Left arm, Patient Position: Sitting, Cuff Size: Adult)   Pulse 70   Temp 96.9 °F (36.1 °C) (Temporal)   Ht 175.3 cm (69\")   Wt 87.3 kg (192 lb 6.4 oz)   SpO2 97%   BMI 28.41 kg/m²         Requested Prescriptions      No prescriptions requested or ordered in this encounter       Routine medications provided by this office will also be refilled via pharmacy request.       Current Outpatient Medications:   •  aspirin 81 MG EC tablet, Take 162 mg by mouth Daily., Disp: , Rfl:   •  atorvastatin (LIPITOR) 20 MG tablet, TAKE ONE TABLET BY MOUTH DAILY, Disp: 90 tablet, Rfl: 0  •  ramipril (ALTACE) 5 MG capsule, TAKE ONE CAPSULE BY MOUTH DAILY, Disp: 90 capsule, Rfl: 0     Assessment and Plan:      Assessment and Plan {CC Problem List  Visit Diagnosis  ROS  Review (Popup)  Health Maintenance  Quality  BestPractice  Medications  SmartSets  SnapShot Encounters  Media :23}     Problem List Items Addressed This Visit        Cardiac and Vasculature    Coronary artery disease involving native coronary artery of native heart without angina pectoris (Chronic)    Overview     2003: heart cath: 50% LAD: medication management only   RF: former smoker   FH: Mother: heart disease         Relevant Orders    Comprehensive Metabolic Panel    Lipid Panel With LDL / HDL Ratio    Hyperlipidemia (Chronic)    Overview     Lipitor since 2003          Current Assessment & Plan     Lipid abnormalities are improving " with treatment.  Pharmacotherapy as ordered.  Lipids will be reassessed in 6 months.    Lab Results   Component Value Date    CHLPL 143 04/01/2021    TRIG 72 04/01/2021    HDL 48 04/01/2021    LDL 81 04/01/2021            Relevant Orders    Comprehensive Metabolic Panel    Lipid Panel With LDL / HDL Ratio    Hypertension with heart disease (Chronic)    Current Assessment & Plan     Hypertension is improving with treatment.  Continue current treatment regimen.  Dietary sodium restriction.  Regular aerobic exercise.  Continue current medications.  Blood pressure will be reassessed at the next regular appointment.         Relevant Orders    Comprehensive Metabolic Panel    CBC (No Diff)    Stenosis of carotid artery - Primary (Chronic)    Overview     • 10/14/2021: Mild bilateral internal carotid artery plaque without any significant stenosis.           Varicose veins of right lower extremity (Chronic)    Overview     Evaluated by Dr Mai: continues compression garment right lower extremity             Follow Up {Instructions Charge Capture  Follow-up Communications :23}     Return in about 6 months (around 5/29/2023) for Medicare Wellness.      Patient was given instructions and counseling regarding his condition or for health maintenance advice. Please see specific information pulled into the AVS if appropriate.    Sil disclaimer:   Much of this encounter note is an electronic transcription/translation of spoken language to printed text. The electronic translation of spoken language may permit erroneous, or at times, nonsensical words or phrases to be inadvertently transcribed; Although I have reviewed the note for such errors, some may still exist.     Additional Patient Counseling:       Patient Instructions   Diet:    • Eat vegetables, fruits, whole grain, low-fat dairy, poultry, fish, beans, nontropical vegetable oils, and nuts, but avoid red meat (i.e., Mediterranean-style diet, DASH [Dietary Approaches  to Stop Hypertension] diet).  • Limit sugary drinks and sweets.  • Limit saturated and trans fat to 5% to 6% of calories.  • Limit sodium intake to 2,400 mg daily (about one teaspoon table salt [kosher/sea salt have less sodium per teaspoon]).    Weight loss / Calorie Counting Apps:    • Lose It!   • [employer] Pal     Exercise:   • Engage in moderate-to-vigorous aerobic activity for at least 40 minutes (on average) three to four times each week.    Wearables:   • Activity tracker   • Step tracker     Skin Care:   • Use sun screen SPF >30 daily  • Dermatologist for skin check regularly    Bone Health:   • [email or web address]    Mental Health:       CDC recommends Flu vaccines for everyone 6 months and older EVERY season with rare exceptions.

## 2022-11-29 NOTE — ASSESSMENT & PLAN NOTE
Lipid abnormalities are improving with treatment.  Pharmacotherapy as ordered.  Lipids will be reassessed in 6 months.    Lab Results   Component Value Date    CHLPL 143 04/01/2021    TRIG 72 04/01/2021    HDL 48 04/01/2021    LDL 81 04/01/2021

## 2022-11-29 NOTE — PATIENT INSTRUCTIONS
Diet:    Eat vegetables, fruits, whole grain, low-fat dairy, poultry, fish, beans, nontropical vegetable oils, and nuts, but avoid red meat (i.e., Mediterranean-style diet, DASH [Dietary Approaches to Stop Hypertension] diet).  Limit sugary drinks and sweets.  Limit saturated and trans fat to 5% to 6% of calories.  Limit sodium intake to 2,400 mg daily (about one teaspoon table salt [kosher/sea salt have less sodium per teaspoon]).    Weight loss / Calorie Counting Apps:    Lose It!   [employer] Pal     Exercise:   Engage in moderate-to-vigorous aerobic activity for at least 40 minutes (on average) three to four times each week.    Wearables:   Activity tracker   Step tracker     Skin Care:   Use sun screen SPF >30 daily  Dermatologist for skin check regularly    Bone Health:   [email or web address]    Mental Health:       CDC recommends Flu vaccines for everyone 6 months and older EVERY season with rare exceptions.

## 2022-11-30 ENCOUNTER — TELEPHONE (OUTPATIENT)
Dept: INTERNAL MEDICINE | Facility: CLINIC | Age: 77
End: 2022-11-30

## 2022-11-30 LAB
ALBUMIN SERPL-MCNC: 4.4 G/DL (ref 3.5–5.2)
ALBUMIN/GLOB SERPL: 1.7 G/DL
ALP SERPL-CCNC: 73 U/L (ref 39–117)
ALT SERPL-CCNC: 23 U/L (ref 1–41)
AST SERPL-CCNC: 21 U/L (ref 1–40)
BILIRUB SERPL-MCNC: 0.4 MG/DL (ref 0–1.2)
BUN SERPL-MCNC: 18 MG/DL (ref 8–23)
BUN/CREAT SERPL: 19.4 (ref 7–25)
CALCIUM SERPL-MCNC: 9.5 MG/DL (ref 8.6–10.5)
CHLORIDE SERPL-SCNC: 104 MMOL/L (ref 98–107)
CHOLEST SERPL-MCNC: 151 MG/DL (ref 0–200)
CO2 SERPL-SCNC: 23.8 MMOL/L (ref 22–29)
CREAT SERPL-MCNC: 0.93 MG/DL (ref 0.76–1.27)
EGFRCR SERPLBLD CKD-EPI 2021: 84 ML/MIN/1.73
ERYTHROCYTE [DISTWIDTH] IN BLOOD BY AUTOMATED COUNT: 13.2 % (ref 12.3–15.4)
GLOBULIN SER CALC-MCNC: 2.6 GM/DL
GLUCOSE SERPL-MCNC: 105 MG/DL (ref 65–99)
HCT VFR BLD AUTO: 50.2 % (ref 37.5–51)
HDLC SERPL-MCNC: 47 MG/DL (ref 40–60)
HGB BLD-MCNC: 16.3 G/DL (ref 13–17.7)
LDLC SERPL CALC-MCNC: 89 MG/DL (ref 0–100)
LDLC/HDLC SERPL: 1.89 {RATIO}
MCH RBC QN AUTO: 28.2 PG (ref 26.6–33)
MCHC RBC AUTO-ENTMCNC: 32.5 G/DL (ref 31.5–35.7)
MCV RBC AUTO: 86.7 FL (ref 79–97)
PLATELET # BLD AUTO: 201 10*3/MM3 (ref 140–450)
POTASSIUM SERPL-SCNC: 4.8 MMOL/L (ref 3.5–5.2)
PROT SERPL-MCNC: 7 G/DL (ref 6–8.5)
RBC # BLD AUTO: 5.79 10*6/MM3 (ref 4.14–5.8)
SODIUM SERPL-SCNC: 139 MMOL/L (ref 136–145)
TRIGL SERPL-MCNC: 75 MG/DL (ref 0–150)
VLDLC SERPL CALC-MCNC: 15 MG/DL (ref 5–40)
WBC # BLD AUTO: 7.07 10*3/MM3 (ref 3.4–10.8)

## 2022-11-30 NOTE — TELEPHONE ENCOUNTER
KEILA DURON TO READ    LEFT PATIENT A VOICEMAIL REGARDING LAB RESULTS.     YESSICA STEWART      ----- Message from FRANCO Hubbard III sent at 11/30/2022  7:54 AM EST -----  Please call and notify  Mr. Ryan,     Recent lab work is normal.    Cholesterol is controlled.  No medication changes needed.       WCN

## 2022-12-30 DIAGNOSIS — I10 ESSENTIAL HYPERTENSION: ICD-10-CM

## 2022-12-30 DIAGNOSIS — E78.2 MIXED HYPERLIPIDEMIA: ICD-10-CM

## 2022-12-30 RX ORDER — ASPIRIN 81 MG/1
162 TABLET ORAL DAILY
Qty: 180 TABLET | Refills: 1 | Status: SHIPPED | OUTPATIENT
Start: 2022-12-30

## 2022-12-30 RX ORDER — ATORVASTATIN CALCIUM 20 MG/1
20 TABLET, FILM COATED ORAL DAILY
Qty: 90 TABLET | Refills: 1 | Status: SHIPPED | OUTPATIENT
Start: 2022-12-30

## 2022-12-30 RX ORDER — RAMIPRIL 5 MG/1
5 CAPSULE ORAL DAILY
Qty: 90 CAPSULE | Refills: 1 | Status: SHIPPED | OUTPATIENT
Start: 2022-12-30

## 2022-12-30 NOTE — TELEPHONE ENCOUNTER
Rx Refill Note  Requested Prescriptions     Pending Prescriptions Disp Refills   • ramipril (ALTACE) 5 MG capsule 90 capsule 0     Sig: Take 1 capsule by mouth Daily.   • atorvastatin (LIPITOR) 20 MG tablet 90 tablet 0     Sig: Take 1 tablet by mouth Daily.   • aspirin 81 MG EC tablet       Sig: Take 2 tablets by mouth Daily.      Last office visit with prescribing clinician: 11/29/2022   Last telemedicine visit with prescribing clinician: 5/31/2023   Next office visit with prescribing clinician: 5/31/2023         Zulma Hernández MA  12/30/22, 14:39 EST

## 2022-12-30 NOTE — TELEPHONE ENCOUNTER
Caller: Dennis Ryan RC    Relationship: Self    Best call back number: [phone]    Requested Prescriptions:   Requested Prescriptions     Pending Prescriptions Disp Refills   • ramipril (ALTACE) 5 MG capsule 90 capsule 0     Sig: Take 1 capsule by mouth Daily.   • atorvastatin (LIPITOR) 20 MG tablet 90 tablet 0     Sig: Take 1 tablet by mouth Daily.   • aspirin 81 MG EC tablet       Sig: Take 2 tablets by mouth Daily.        Pharmacy where request should be sent: [facility] [id number] [place] [address] [address] AT [facility] [address]  - [phone] [facility] [phone] FX     Additional details provided by patient: PATIENT HAS THREE DAYS LEFT    Does the patient have less than a 3 day supply:  [x] Yes  [] No    Would you like a call back once the refill request has been completed: [] Yes [x] No    If the office needs to give you a call back, can they leave a voicemail: [] Yes [x] No    Heidy Akers Rep   12/30/22 14:05 EST

## 2023-01-31 ENCOUNTER — OFFICE VISIT (OUTPATIENT)
Dept: CARDIOLOGY | Facility: CLINIC | Age: 78
End: 2023-01-31
Payer: MEDICARE

## 2023-01-31 VITALS
DIASTOLIC BLOOD PRESSURE: 70 MMHG | SYSTOLIC BLOOD PRESSURE: 120 MMHG | WEIGHT: 190 LBS | HEART RATE: 69 BPM | HEIGHT: 69 IN | BODY MASS INDEX: 28.14 KG/M2

## 2023-01-31 DIAGNOSIS — I10 ESSENTIAL HYPERTENSION: ICD-10-CM

## 2023-01-31 DIAGNOSIS — I25.10 CORONARY ARTERY DISEASE INVOLVING NATIVE CORONARY ARTERY OF NATIVE HEART WITHOUT ANGINA PECTORIS: Primary | ICD-10-CM

## 2023-01-31 DIAGNOSIS — I65.23 CAROTID ARTERY PLAQUE, BILATERAL: ICD-10-CM

## 2023-01-31 DIAGNOSIS — R00.2 PALPITATION: ICD-10-CM

## 2023-01-31 PROCEDURE — 99214 OFFICE O/P EST MOD 30 MIN: CPT | Performed by: INTERNAL MEDICINE

## 2023-01-31 PROCEDURE — 93000 ELECTROCARDIOGRAM COMPLETE: CPT | Performed by: INTERNAL MEDICINE

## 2023-03-16 ENCOUNTER — HOSPITAL ENCOUNTER (OUTPATIENT)
Dept: CARDIOLOGY | Facility: HOSPITAL | Age: 78
Discharge: HOME | End: 2023-03-16
Admitting: INTERNAL MEDICINE
Payer: MEDICARE

## 2023-03-16 DIAGNOSIS — I65.23 CAROTID ARTERY PLAQUE, BILATERAL: ICD-10-CM

## 2023-03-16 PROCEDURE — 93880 EXTRACRANIAL BILAT STUDY: CPT

## 2023-03-16 PROCEDURE — 93880 EXTRACRANIAL BILAT STUDY: CPT | Performed by: INTERNAL MEDICINE

## 2023-03-17 LAB
BH CV XLRA MEAS LEFT DIST CCA EDV: -19.6 CM/SEC
BH CV XLRA MEAS LEFT DIST CCA PSV: -80.6 CM/SEC
BH CV XLRA MEAS LEFT DIST ICA EDV: -23.1 CM/SEC
BH CV XLRA MEAS LEFT DIST ICA PSV: -74.5 CM/SEC
BH CV XLRA MEAS LEFT ICA/CCA RATIO: 0.94
BH CV XLRA MEAS LEFT MID CCA EDV: -20.3 CM/SEC
BH CV XLRA MEAS LEFT MID CCA PSV: -90.4 CM/SEC
BH CV XLRA MEAS LEFT MID ICA EDV: -19.3 CM/SEC
BH CV XLRA MEAS LEFT MID ICA PSV: -74 CM/SEC
BH CV XLRA MEAS LEFT PROX CCA EDV: 18.9 CM/SEC
BH CV XLRA MEAS LEFT PROX CCA PSV: 103.7 CM/SEC
BH CV XLRA MEAS LEFT PROX ECA PSV: -183 CM/SEC
BH CV XLRA MEAS LEFT PROX ICA EDV: -14.7 CM/SEC
BH CV XLRA MEAS LEFT PROX ICA PSV: -75.7 CM/SEC
BH CV XLRA MEAS LEFT PROX SCLA PSV: 123.2 CM/SEC
BH CV XLRA MEAS LEFT VERTEBRAL A PSV: -59.1 CM/SEC
BH CV XLRA MEAS RIGHT DIST CCA EDV: -15.5 CM/SEC
BH CV XLRA MEAS RIGHT DIST CCA PSV: -67.7 CM/SEC
BH CV XLRA MEAS RIGHT DIST ICA EDV: -19.5 CM/SEC
BH CV XLRA MEAS RIGHT DIST ICA PSV: -70.2 CM/SEC
BH CV XLRA MEAS RIGHT ICA/CCA RATIO: -1.08
BH CV XLRA MEAS RIGHT MID CCA EDV: 19.3 CM/SEC
BH CV XLRA MEAS RIGHT MID CCA PSV: 81.4 CM/SEC
BH CV XLRA MEAS RIGHT MID ICA EDV: -18 CM/SEC
BH CV XLRA MEAS RIGHT MID ICA PSV: -66.6 CM/SEC
BH CV XLRA MEAS RIGHT PROX CCA EDV: 18 CM/SEC
BH CV XLRA MEAS RIGHT PROX CCA PSV: 95.1 CM/SEC
BH CV XLRA MEAS RIGHT PROX ECA PSV: -112.2 CM/SEC
BH CV XLRA MEAS RIGHT PROX ICA EDV: 22 CM/SEC
BH CV XLRA MEAS RIGHT PROX ICA PSV: 72.9 CM/SEC
BH CV XLRA MEAS RIGHT PROX SCLA PSV: 124.1 CM/SEC
BH CV XLRA MEAS RIGHT VERTEBRAL A PSV: -43.1 CM/SEC
MAXIMAL PREDICTED HEART RATE: 142 BPM
STRESS TARGET HR: 121 BPM

## 2023-03-19 ENCOUNTER — TELEPHONE (OUTPATIENT)
Dept: CARDIOLOGY | Facility: CLINIC | Age: 78
End: 2023-03-19
Payer: MEDICARE

## 2023-03-19 NOTE — TELEPHONE ENCOUNTER
Please let patient know that the ultrasound of the neck showed less than 50% blockage bilaterally.  Needs aggressive Esterle receptor modification.  LDL remains high on blood work 11/2022.  Please have him see PCP regarding next

## 2023-03-20 NOTE — TELEPHONE ENCOUNTER
Called and left VM. Will continue to try to reach patient.     Betzaida Galvan RN  Triage [facility]

## 2023-03-20 NOTE — TELEPHONE ENCOUNTER
Notified patient of results/recommendations. Patient verbalized understanding.    Betzaida Galvan RN  Triage [facility] [place] [facility]

## 2023-05-31 ENCOUNTER — OFFICE VISIT (OUTPATIENT)
Dept: INTERNAL MEDICINE | Facility: CLINIC | Age: 78
End: 2023-05-31

## 2023-05-31 VITALS
BODY MASS INDEX: 28.68 KG/M2 | HEIGHT: 69 IN | SYSTOLIC BLOOD PRESSURE: 128 MMHG | OXYGEN SATURATION: 96 % | WEIGHT: 193.6 LBS | DIASTOLIC BLOOD PRESSURE: 82 MMHG | HEART RATE: 65 BPM

## 2023-05-31 DIAGNOSIS — E78.2 MIXED HYPERLIPIDEMIA: Chronic | ICD-10-CM

## 2023-05-31 DIAGNOSIS — I25.10 CORONARY ARTERY DISEASE INVOLVING NATIVE CORONARY ARTERY OF NATIVE HEART WITHOUT ANGINA PECTORIS: Chronic | ICD-10-CM

## 2023-05-31 DIAGNOSIS — I83.91 ASYMPTOMATIC VARICOSE VEINS OF RIGHT LOWER EXTREMITY: Chronic | ICD-10-CM

## 2023-05-31 DIAGNOSIS — Z23 NEED FOR PNEUMOCOCCAL VACCINE: ICD-10-CM

## 2023-05-31 DIAGNOSIS — I10 ESSENTIAL HYPERTENSION: ICD-10-CM

## 2023-05-31 DIAGNOSIS — Z87.442 HISTORY OF KIDNEY STONES: ICD-10-CM

## 2023-05-31 DIAGNOSIS — R00.2 PALPITATIONS: ICD-10-CM

## 2023-05-31 DIAGNOSIS — I65.23 BILATERAL CAROTID ARTERY STENOSIS: Chronic | ICD-10-CM

## 2023-05-31 DIAGNOSIS — I49.9 IRREGULAR HEART RHYTHM: ICD-10-CM

## 2023-05-31 DIAGNOSIS — Z00.00 MEDICARE ANNUAL WELLNESS VISIT, SUBSEQUENT: Primary | ICD-10-CM

## 2023-05-31 PROBLEM — N20.0 KIDNEY STONE ON LEFT SIDE: Status: RESOLVED | Noted: 2018-06-17 | Resolved: 2023-05-31

## 2023-05-31 NOTE — ASSESSMENT & PLAN NOTE
Patient states [facility] did not want to increase statin.     Last note from Dr Lagos - work on reduction.     If LDL not less than 70 - he agrees to increase lipitor to 40 mg daily.

## 2023-05-31 NOTE — PATIENT INSTRUCTIONS
Medicare Wellness  Personal Prevention Plan of Service     Date of Office Visit:    Encounter Provider:  Steve Tyler III, NP-C  Place of Service:  [facility]  Patient Name: Dennis Ryan  :  1945    As part of the Medicare Wellness portion of your visit today, we are providing you with this personalized preventive plan of services (PPPS). This plan is based upon recommendations of the [place] Preventive Services Task Force (USPSTF) and the Advisory Committee on Immunization Practices (ACIP).    This lists the preventive care services that should be considered, and provides dates of when you are due. Items listed as completed are up-to-date and do not require any further intervention.    Health Maintenance   Topic Date Due    Pneumococcal Vaccine 65+ (1 - PCV) Never done    ANNUAL WELLNESS VISIT  Never done    COVID-19 Vaccine (5 - Booster for Pfizer series) 2022    INFLUENZA VACCINE  2023    LIPID PANEL  2023    COLORECTAL CANCER SCREENING  2028    HEPATITIS C SCREENING  Completed    ZOSTER VACCINE  Completed    TDAP/TD VACCINES  Discontinued       Orders Placed This Encounter   Procedures    Pneumococcal Conjugate Vaccine 20-Valent All    Comprehensive Metabolic Panel     Order Specific Question:   Release to patient     Answer:   Routine Release    Lipid Panel With LDL / HDL Ratio     Order Specific Question:   Release to patient     Answer:   Routine Release    CBC (No Diff)     Order Specific Question:   Release to patient     Answer:   Routine Release    Thyroid Panel With TSH     Order Specific Question:   Release to patient     Answer:   Routine Release    Magnesium     Order Specific Question:   Release to patient     Answer:   Routine Release       Return in about 6 months (around 2023).

## 2023-05-31 NOTE — PROGRESS NOTES
The ABCs of the Annual Wellness Visit  Subsequent Medicare Wellness Visit    Subjective    Dennis Ryan is a 77 y.o. male who presents for a Subsequent Medicare Wellness Visit.    The following portions of the patient's history were reviewed and   updated as appropriate: allergies, current medications, past family history, past medical history, past social history, past surgical history and problem list.    Wt Readings from Last 4 Encounters:   05/31/23 87.8 kg (193 lb 9.6 oz)   01/31/23 86.2 kg (190 lb)   11/29/22 87.3 kg (192 lb 6.4 oz)   03/08/22 87.5 kg (193 lb)       Weight trend is stable.    Mobility    [x] Ambulates independently  [] Ambulates with cane   [] Ambulates with quad cane  [] Ambulates with rollator   [] Ambulates with walker   [] Mobile with wheelchair   [] Mobile with electric wheelchair     Continence    [x] Continent of bowel and bladder  [] Incontinent bowel and bladder   [] Incontinent bladder    [] Incontinent bowel      Compared to one year ago, the patient feels his physical   health is the same.  States doing very well.     Compared to one year ago, the patient feels his mental   health is the same.    Recent Hospitalizations:  He was not admitted to the hospital during the last year.       Current Medical Providers:  Patient Care Team:  Steve Tyler III, NP-C as PCP - General (Internal Medicine)  Juliann Lagos MD as Consulting Physician (Cardiology)  Russ León MD as Consulting Physician (Urology)    Outpatient Medications Prior to Visit   Medication Sig Dispense Refill   • aspirin 81 MG EC tablet Take 2 tablets by mouth Daily. 180 tablet 1   • atorvastatin (LIPITOR) 20 MG tablet Take 1 tablet by mouth Daily. 90 tablet 1   • ramipril (ALTACE) 5 MG capsule Take 1 capsule by mouth Daily. 90 capsule 1     No facility-administered medications prior to visit.       No opioid medication identified on active medication list. I have reviewed chart for other potential   "high risk medication/s and harmful drug interactions in the elderly.          Aspirin is on active medication list. Aspirin use is indicated based on review of current medical condition/s. Pros and cons of this therapy have been discussed today. Benefits of this medication outweigh potential harm.  Patient has been encouraged to continue taking this medication.  .      Patient Active Problem List   Diagnosis   • Coronary artery disease involving native coronary artery of native heart without angina pectoris   • White coat hypertension   • Hyperlipidemia   • Hypertension with heart disease   • Left ventricular hypertrophy   • Palpitations   • Irregular heart rhythm   • Stenosis of carotid artery   • Acute right hip pain   • Varicose veins of right lower extremity   • Essential hypertension   • History of kidney stones     Advance Care Planning  Advance Directive is not on file.  ACP discussion was held with the patient during this visit. Patient has an advance directive (not in EMR), copy requested.     Objective    Vitals:    23 1307   BP: 128/82   BP Location: Left arm   Patient Position: Sitting   Cuff Size: Adult   Pulse: 65   SpO2: 96%   Weight: 87.8 kg (193 lb 9.6 oz)   Height: 175.3 cm (69\")   PainSc: 0-No pain     Estimated body mass index is 28.59 kg/m² as calculated from the following:    Height as of this encounter: 175.3 cm (69\").    Weight as of this encounter: 87.8 kg (193 lb 9.6 oz).    BMI is >= 25 and <30. (Overweight) The following options were offered after discussion;: nutrition counseling/recommendations      Does the patient have evidence of cognitive impairment? No          HEALTH RISK ASSESSMENT    Smoking Status:  Social History     Tobacco Use   Smoking Status Former   • Packs/day: 1.00   • Years: 10.00   • Pack years: 10.00   • Types: Cigarettes   • Start date: 1958   • Quit date: 1967   • Years since quittin.2   Smokeless Tobacco Never     Alcohol Consumption:  Social " History     Substance and Sexual Activity   Alcohol Use No    Comment: No caffeine use     Fall Risk Screen:    JOSEE Fall Risk Assessment was completed, and patient is at LOW risk for falls.Assessment completed on:2023    Depression Screenin/31/2023     1:09 PM   PHQ-2/PHQ-9 Depression Screening   Little Interest or Pleasure in Doing Things 0-->not at all   Feeling Down, Depressed or Hopeless 0-->not at all   PHQ-9: Brief Depression Severity Measure Score 0       Health Habits and Functional and Cognitive Screenin/31/2023     1:08 PM   Functional & Cognitive Status   Do you have difficulty preparing food and eating? No   Do you have difficulty bathing yourself, getting dressed or grooming yourself? No   Do you have difficulty using the toilet? No   Do you have difficulty moving around from place to place? No   Do you have trouble with steps or getting out of a bed or a chair? No   Current Diet Well Balanced Diet   Dental Exam Up to date   Eye Exam Not up to date   Exercise (times per week) 4 times per week   Current Exercises Include Walking   Do you need help using the phone?  No   Are you deaf or do you have serious difficulty hearing?  No   Do you need help with transportation? No   Do you need help shopping? No   Do you need help preparing meals?  No   Do you need help with housework?  No   Do you need help with laundry? No   Do you need help taking your medications? No   Do you need help managing money? No   Do you ever drive or ride in a car without wearing a seat belt? No   Have you felt unusual stress, anger or loneliness in the last month? No   Who do you live with? Alone   If you need help, do you have trouble finding someone available to you? No   Have you been bothered in the last four weeks by sexual problems? No   Do you have difficulty concentrating, remembering or making decisions? Yes       Age-appropriate Screening Schedule:  Refer to the list below for future screening  recommendations based on patient's age, sex and/or medical conditions. Orders for these recommended tests are listed in the plan section. The patient has been provided with a written plan.    Health Maintenance   Topic Date Due   • ANNUAL WELLNESS VISIT  Never done   • COVID-19 Vaccine (5 - Booster for Pfizer series) 09/13/2022   • INFLUENZA VACCINE  08/01/2023   • LIPID PANEL  11/29/2023   • COLORECTAL CANCER SCREENING  04/02/2028   • HEPATITIS C SCREENING  Completed   • Pneumococcal Vaccine 65+  Completed   • ZOSTER VACCINE  Completed   • TDAP/TD VACCINES  Discontinued                CMS Preventative Services Quick Reference  Risk Factors Identified During Encounter  Immunizations Discussed/Encouraged: Prevnar 20 (Pneumococcal 20-valent conjugate)      The above risks/problems have been discussed with the patient.  Pertinent information has been shared with the patient in the After Visit Summary.  An After Visit Summary and PPPS were made available to the patient.    Follow Up:   Next Medicare Wellness visit to be scheduled in 1 year.       Additional E&M Note during same encounter follows:  Patient has multiple medical problems which are significant and separately identifiable that require additional work above and beyond the Medicare Wellness Visit.      Chief Complaint  Medicare Wellness-subsequent and Hypertension    Subjective        Dennis Ryan is also being seen today for AWV and follow up chronic conditions: hypertension, hyperlipidemia, CAD (2003: modest LAD), carotid stenosis (last doppler: 3/16/23: less than 50% stenosis), hx kidney stones (continues yearly follow up with urology - next will be with ISAÍAS León as his father has retired)     Hypertension: chronic and continues ramipril   No CP, SOA    CAD: continues statin and ASA  Hyperlipidemia: last , LDL 89, TG 75     Irregular heart rhythm: states only feels it at night.  Asymptomatic.   States Dr Lagos asked for me to add thyroid panel with  "labs.       Objective   Vital Signs:  /82 (BP Location: Left arm, Patient Position: Sitting, Cuff Size: Adult)   Pulse 65   Ht 175.3 cm (69\")   Wt 87.8 kg (193 lb 9.6 oz)   SpO2 96%   BMI 28.59 kg/m²     Physical Exam  Vitals reviewed.   Constitutional:       Appearance: Normal appearance. He is well-developed.   Eyes:      Conjunctiva/sclera: Conjunctivae normal.   Neck:      Thyroid: No thyromegaly.   Cardiovascular:      Rate and Rhythm: Normal rate. Rhythm irregular.      Pulses: Normal pulses.      Heart sounds: Normal heart sounds.   Pulmonary:      Effort: Pulmonary effort is normal.      Breath sounds: Normal breath sounds.      Comments: E/U   Abdominal:      General: Bowel sounds are normal.      Palpations: Abdomen is soft.   Musculoskeletal:         General: Normal range of motion.      Cervical back: Normal range of motion and neck supple.      Right lower leg: No edema.      Left lower leg: No edema.      Comments: Wearing compression garment right lower extremity    Lymphadenopathy:      Cervical: No cervical adenopathy.   Skin:     General: Skin is warm and dry.      Capillary Refill: Capillary refill takes 2 to 3 seconds.   Neurological:      Mental Status: He is alert and oriented to person, place, and time.   Psychiatric:         Mood and Affect: Mood normal.         Behavior: Behavior normal. Behavior is cooperative.         Thought Content: Thought content normal.         Judgment: Judgment normal.          The following data was reviewed by: Steve Tyler III, NP-C on 05/31/2023:  Common labs        11/29/2022    14:36   Common Labs   Glucose 105     BUN 18     Creatinine 0.93     Sodium 139     Potassium 4.8     Chloride 104     Calcium 9.5     Total Protein 7.0     Albumin 4.40     Total Bilirubin 0.4     Alkaline Phosphatase 73     AST (SGOT) 21     ALT (SGPT) 23     WBC 7.07     Hemoglobin 16.3     Hematocrit 50.2     Platelets 201     Total Cholesterol 151   "   Triglycerides 75     HDL Cholesterol 47     LDL Cholesterol  89                  Assessment and Plan     Problem List Items Addressed This Visit        Cardiac and Vasculature    Varicose veins of right lower extremity (Chronic)    Overview     Evaluated by Dr Mai: continues compression garment right lower extremity          Coronary artery disease involving native coronary artery of native heart without angina pectoris (Chronic)    Overview     2003: heart cath: 50% LAD: medication management only   RF: former smoker   FH: Mother: heart disease         Current Assessment & Plan     Continue asa and statin          Relevant Orders    Comprehensive Metabolic Panel    Lipid Panel With LDL / HDL Ratio    CBC (No Diff)    Essential hypertension (Chronic)    Current Assessment & Plan     Hypertension is improving with treatment.  Continue current treatment regimen.  Dietary sodium restriction.  Regular aerobic exercise.  Continue current medications.  Blood pressure will be reassessed at the next regular appointment.         Relevant Orders    Comprehensive Metabolic Panel    Hyperlipidemia (Chronic)    Overview     Lipitor since 2003          Current Assessment & Plan     Patient states Ivon did not want to increase statin.     Last note from Dr Lagos - work on reduction.     If LDL not less than 70 - he agrees to increase lipitor to 40 mg daily.          Relevant Orders    Comprehensive Metabolic Panel    Lipid Panel With LDL / HDL Ratio    Stenosis of carotid artery (Chronic)    Overview     • 10/14/2021: Mild bilateral internal carotid artery plaque without any significant stenosis.  • 3/16/2023: less than 50% stenosis bl            Palpitations    Relevant Orders    Thyroid Panel With TSH    Magnesium    Irregular heart rhythm    Overview     States only notices when he goes to bed.   Asymptomatic             Genitourinary and Reproductive     History of kidney stones (Chronic)    Overview     Followed by  Dr León         Other Visit Diagnoses     Medicare annual wellness visit, subsequent    -  Primary    Need for pneumococcal vaccine        Relevant Orders    Pneumococcal Conjugate Vaccine 20-Valent All (Completed)                    Follow Up     Return in about 6 months (around 11/30/2023).    Patient was given instructions and counseling regarding his condition or for health maintenance advice. Please see specific information pulled into the AVS if appropriate.

## 2023-06-01 ENCOUNTER — DOCUMENTATION (OUTPATIENT)
Dept: INTERNAL MEDICINE | Facility: CLINIC | Age: 78
End: 2023-06-01

## 2023-06-01 LAB
ALBUMIN SERPL-MCNC: 4.5 G/DL (ref 3.5–5.2)
ALBUMIN/GLOB SERPL: 1.6 G/DL
ALP SERPL-CCNC: 76 U/L (ref 39–117)
ALT SERPL-CCNC: 27 U/L (ref 1–41)
AST SERPL-CCNC: 24 U/L (ref 1–40)
BILIRUB SERPL-MCNC: 0.4 MG/DL (ref 0–1.2)
BUN SERPL-MCNC: 26 MG/DL (ref 8–23)
BUN/CREAT SERPL: 25.5 (ref 7–25)
CALCIUM SERPL-MCNC: 9.9 MG/DL (ref 8.6–10.5)
CHLORIDE SERPL-SCNC: 104 MMOL/L (ref 98–107)
CHOLEST SERPL-MCNC: 145 MG/DL (ref 0–200)
CO2 SERPL-SCNC: 26 MMOL/L (ref 22–29)
CREAT SERPL-MCNC: 1.02 MG/DL (ref 0.76–1.27)
EGFRCR SERPLBLD CKD-EPI 2021: 75.2 ML/MIN/1.73
ERYTHROCYTE [DISTWIDTH] IN BLOOD BY AUTOMATED COUNT: 12.9 % (ref 12.3–15.4)
FT4I SERPL CALC-MCNC: 2.5 (ref 1.2–4.9)
GLOBULIN SER CALC-MCNC: 2.8 GM/DL
GLUCOSE SERPL-MCNC: 105 MG/DL (ref 65–99)
HCT VFR BLD AUTO: 48.8 % (ref 37.5–51)
HDLC SERPL-MCNC: 49 MG/DL (ref 40–60)
HGB BLD-MCNC: 16.4 G/DL (ref 13–17.7)
LDLC SERPL CALC-MCNC: 85 MG/DL (ref 0–100)
LDLC/HDLC SERPL: 1.74 {RATIO}
MAGNESIUM SERPL-MCNC: 2.3 MG/DL (ref 1.6–2.4)
MCH RBC QN AUTO: 28.2 PG (ref 26.6–33)
MCHC RBC AUTO-ENTMCNC: 33.6 G/DL (ref 31.5–35.7)
MCV RBC AUTO: 84 FL (ref 79–97)
PLATELET # BLD AUTO: 211 10*3/MM3 (ref 140–450)
POTASSIUM SERPL-SCNC: 5.5 MMOL/L (ref 3.5–5.2)
PROT SERPL-MCNC: 7.3 G/DL (ref 6–8.5)
RBC # BLD AUTO: 5.81 10*6/MM3 (ref 4.14–5.8)
SODIUM SERPL-SCNC: 140 MMOL/L (ref 136–145)
T3RU NFR SERPL: 27 % (ref 24–39)
T4 SERPL-MCNC: 9.1 UG/DL (ref 4.5–12)
TRIGL SERPL-MCNC: 53 MG/DL (ref 0–150)
TSH SERPL DL<=0.005 MIU/L-ACNC: 1.89 UIU/ML (ref 0.45–4.5)
VLDLC SERPL CALC-MCNC: 11 MG/DL (ref 5–40)
WBC # BLD AUTO: 6.51 10*3/MM3 (ref 3.4–10.8)

## 2023-06-01 RX ORDER — ATORVASTATIN CALCIUM 40 MG/1
40 TABLET, FILM COATED ORAL DAILY
Qty: 90 TABLET | Refills: 1 | Status: SHIPPED | OUTPATIENT
Start: 2023-06-01

## 2023-06-01 NOTE — PROGRESS NOTES
Please call and notify  Mr. Ryan,     Recent lab work is normal.      Thyroid level was normal.     Your LDL is 85: we can optimize your cholesterol level by increasing your lipitor to 40 mg daily.   I will send it to your pharmacy. Let me know if you have any issues.     WCN

## 2023-06-01 NOTE — PROGRESS NOTES
"  Advised patient to increase lipitor to 40 mg daily:     Lab Results   Component Value Date    CHLPL 145 05/31/2023    TRIG 53 05/31/2023    HDL 49 05/31/2023    LDL 85 05/31/2023       Patient declined: \"STATES THAT HIS CARDIOLOGIST DIDN'T WANT HIM TO GO ON A HIGHER DOSE\"    I had discussed with him Dr aLgos' note:   Telephone Encounter by Juliann Lagos MD (03/19/2023 6:46 PM)        "

## 2023-06-08 ENCOUNTER — OFFICE VISIT (OUTPATIENT)
Dept: INTERNAL MEDICINE | Facility: CLINIC | Age: 78
End: 2023-06-08
Payer: MEDICARE

## 2023-06-08 VITALS
OXYGEN SATURATION: 96 % | BODY MASS INDEX: 28.76 KG/M2 | SYSTOLIC BLOOD PRESSURE: 130 MMHG | HEART RATE: 52 BPM | DIASTOLIC BLOOD PRESSURE: 82 MMHG | WEIGHT: 194.2 LBS | HEIGHT: 69 IN

## 2023-06-08 DIAGNOSIS — H66.90 ACUTE OTITIS MEDIA, UNSPECIFIED OTITIS MEDIA TYPE: Primary | ICD-10-CM

## 2023-06-08 PROCEDURE — 1159F MED LIST DOCD IN RCRD: CPT | Performed by: NURSE PRACTITIONER

## 2023-06-08 PROCEDURE — 3075F SYST BP GE 130 - 139MM HG: CPT | Performed by: NURSE PRACTITIONER

## 2023-06-08 PROCEDURE — 3079F DIAST BP 80-89 MM HG: CPT | Performed by: NURSE PRACTITIONER

## 2023-06-08 PROCEDURE — 99213 OFFICE O/P EST LOW 20 MIN: CPT | Performed by: NURSE PRACTITIONER

## 2023-06-08 PROCEDURE — 1160F RVW MEDS BY RX/DR IN RCRD: CPT | Performed by: NURSE PRACTITIONER

## 2023-06-08 RX ORDER — AMOXICILLIN 500 MG/1
1000 CAPSULE ORAL 2 TIMES DAILY
Qty: 28 CAPSULE | Refills: 0 | Status: SHIPPED | OUTPATIENT
Start: 2023-06-08 | End: 2023-06-15

## 2023-06-08 NOTE — PROGRESS NOTES
"        Chief Complaint  Ear Problem (Patient presents here today for ear popping in his left ear 2-3 weeks )     Subjective:      History of Present Illness {CC  Problem List  Visit  Diagnosis   Encounters  Notes  Medications  Labs  Result Review Imaging  Media :23}     Dennis Ryan presents to [facility] for:  left ear popping     Earache   There is pain in the left ear. This is a new problem. The current episode started 1 to 4 weeks ago. The problem occurs every few minutes. The problem has been unchanged. There has been no fever. The pain is at a severity of 0/10. The patient is experiencing no pain. Associated symptoms include hearing loss. Pertinent negatives include no coughing, ear discharge, headaches, neck pain, rhinorrhea or sore throat. He has tried nothing for the symptoms. His past medical history is significant for a tympanostomy tube.      Left ear popping on and off for the past 3 weeks, diminished hearing but denies pain. History of ear infection in Feb 2022 and saw ENT. Dr Manley placed tube in left ear on March 14, 2022. He was prescribed Ofloxacin \"to prevent reinfection\" but has not been taking since April 2022. Denies fever, chills, dizziness or headache.    I have reviewed patient's medical history, any new submitted information provided by patient or medical assistant and updated medical record.      Objective:      Physical Exam  Vitals reviewed.   Constitutional:       Appearance: Normal appearance. He is well-developed.   HENT:      Left Ear: Decreased hearing noted. Drainage and swelling present. No tenderness. There is no impacted cerumen.   Neck:      Thyroid: No thyromegaly.   Cardiovascular:      Rate and Rhythm: Normal rate and regular rhythm.      Pulses: Normal pulses.      Heart sounds: Normal heart sounds.   Pulmonary:      Effort: Pulmonary effort is normal.      Breath sounds: Normal breath sounds.      Comments: E/U "   Musculoskeletal:         General: Normal range of motion.      Cervical back: Normal range of motion and neck supple.   Lymphadenopathy:      Cervical: Cervical adenopathy present.      Left cervical: Superficial cervical adenopathy present.   Skin:     General: Skin is warm and dry.      Capillary Refill: Capillary refill takes 2 to 3 seconds.   Neurological:      Mental Status: He is alert and oriented to person, place, and time.   Psychiatric:         Behavior: Behavior normal. Behavior is cooperative.      Result Review  Data Reviewed:{ Labs  Result Review  Imaging  Med Tab  Media :23}     The following data was reviewed by: Steve Tyler III, NP-C on 06/08/2023  Common labs          11/29/2022    14:36 5/31/2023    13:58   Common Labs   Glucose 105  105    BUN 18  26    Creatinine 0.93  1.02    Sodium 139  140    Potassium 4.8  5.5    Chloride 104  104    Calcium 9.5  9.9    Total Protein 7.0  7.3    Albumin 4.40  4.5    Total Bilirubin 0.4  0.4    Alkaline Phosphatase 73  76    AST (SGOT) 21  24    ALT (SGPT) 23  27    WBC 7.07  6.51    Hemoglobin 16.3  16.4    Hematocrit 50.2  48.8    Platelets 201  211    Total Cholesterol 151  145    Triglycerides 75  53    HDL Cholesterol 47  49    LDL Cholesterol  89  85      Lab Results - Last 18 Months   Lab Units 05/31/23  1358 11/29/22  1436   BUN mg/dL 26* 18   CREATININE mg/dL 1.02 0.93   SODIUM mmol/L 140 139   POTASSIUM mmol/L 5.5* 4.8   CHLORIDE mmol/L 104 104   CALCIUM mg/dL 9.9 9.5   ALBUMIN g/dL 4.5 4.40   BILIRUBIN mg/dL 0.4 0.4   ALK PHOS U/L 76 73   AST (SGOT) U/L 24 21   ALT (SGPT) U/L 27 23   CHOLESTEROL mg/dL 145 151   TRIGLYCERIDES mg/dL 53 75   HDL CHOL mg/dL 49 47   VLDL CHOLESTEROL SCARLETT mg/dL 11 15   LDL CHOL mg/dL 85 89   LDL/HDL RATIO  1.74 1.89   WBC 10*3/mm3 6.51 7.07   RBC 10*6/mm3 5.81* 5.79   HEMATOCRIT % 48.8 50.2   MCV fL 84.0 86.7   MCH pg 28.2 28.2   TSH uIU/mL 1.890  --           Vital Signs:   /82 (BP Location:  "Left arm, Patient Position: Sitting, Cuff Size: Adult)   Pulse 52   Ht 175.3 cm (69\")   Wt 88.1 kg (194 lb 3.2 oz)   SpO2 96%   BMI 28.68 kg/m²         Requested Prescriptions     Signed Prescriptions Disp Refills    amoxicillin (AMOXIL) 500 MG capsule 28 capsule 0     Sig: Take 2 capsules by mouth 2 (Two) Times a Day for 7 days.       Routine medications provided by this office will also be refilled via pharmacy request.       Current Outpatient Medications:     aspirin 81 MG EC tablet, Take 2 tablets by mouth Daily., Disp: 180 tablet, Rfl: 1    atorvastatin (Lipitor) 40 MG tablet, Take 1 tablet by mouth Daily., Disp: 90 tablet, Rfl: 1    ramipril (ALTACE) 5 MG capsule, Take 1 capsule by mouth Daily., Disp: 90 capsule, Rfl: 1    amoxicillin (AMOXIL) 500 MG capsule, Take 2 capsules by mouth 2 (Two) Times a Day for 7 days., Disp: 28 capsule, Rfl: 0     Assessment and Plan:      Assessment and Plan {CC Problem List  Visit Diagnosis  ROS  Review (Popup)  Health Maintenance  Quality  BestPractice  Medications  SmartSets  SnapShot Encounters  Media :23}     Problem List Items Addressed This Visit    None  Visit Diagnoses       Acute otitis media, unspecified otitis media type    -  Primary    Popping and decreased hearing x3 weeks. Will start Amoxicillin.    Relevant Medications    amoxicillin (AMOXIL) 500 MG capsule            Advised if worsen or doesn't improve: follow up with ENT     Follow Up {Instructions Charge Capture  Follow-up Communications :23}     Return if symptoms worsen or fail to improve.      Patient was given instructions and counseling regarding his condition or for health maintenance advice. Please see specific information pulled into the AVS if appropriate.    Sil disclaimer:   Much of this encounter note is an electronic transcription/translation of spoken language to printed text. The electronic translation of spoken language may permit erroneous, or at times, nonsensical " words or phrases to be inadvertently transcribed; Although I have reviewed the note for such errors, some may still exist.     Additional Patient Counseling:       There are no Patient Instructions on file for this visit.

## 2023-12-24 DIAGNOSIS — I10 ESSENTIAL HYPERTENSION: ICD-10-CM

## 2023-12-26 RX ORDER — ATORVASTATIN CALCIUM 20 MG/1
20 TABLET, FILM COATED ORAL DAILY
Qty: 90 TABLET | Refills: 0 | Status: SHIPPED | OUTPATIENT
Start: 2023-12-26

## 2023-12-26 RX ORDER — RAMIPRIL 5 MG/1
5 CAPSULE ORAL DAILY
Qty: 90 CAPSULE | Refills: 0 | Status: SHIPPED | OUTPATIENT
Start: 2023-12-26

## 2024-02-06 ENCOUNTER — OFFICE VISIT (OUTPATIENT)
Dept: CARDIOLOGY | Facility: CLINIC | Age: 79
End: 2024-02-06
Payer: MEDICARE

## 2024-02-06 ENCOUNTER — TELEPHONE (OUTPATIENT)
Dept: CARDIOLOGY | Facility: CLINIC | Age: 79
End: 2024-02-06

## 2024-02-06 VITALS
WEIGHT: 193 LBS | HEART RATE: 62 BPM | HEIGHT: 69 IN | BODY MASS INDEX: 28.58 KG/M2 | SYSTOLIC BLOOD PRESSURE: 144 MMHG | DIASTOLIC BLOOD PRESSURE: 84 MMHG

## 2024-02-06 DIAGNOSIS — I25.10 CORONARY ARTERY DISEASE INVOLVING NATIVE CORONARY ARTERY OF NATIVE HEART WITHOUT ANGINA PECTORIS: Chronic | ICD-10-CM

## 2024-02-06 DIAGNOSIS — I51.7 LVH (LEFT VENTRICULAR HYPERTROPHY): Primary | ICD-10-CM

## 2024-02-06 DIAGNOSIS — I11.9 HYPERTENSION WITH HEART DISEASE: Chronic | ICD-10-CM

## 2024-02-06 PROCEDURE — 99214 OFFICE O/P EST MOD 30 MIN: CPT | Performed by: INTERNAL MEDICINE

## 2024-02-06 PROCEDURE — 3079F DIAST BP 80-89 MM HG: CPT | Performed by: INTERNAL MEDICINE

## 2024-02-06 PROCEDURE — 3077F SYST BP >= 140 MM HG: CPT | Performed by: INTERNAL MEDICINE

## 2024-02-06 PROCEDURE — 1159F MED LIST DOCD IN RCRD: CPT | Performed by: INTERNAL MEDICINE

## 2024-02-06 PROCEDURE — 93000 ELECTROCARDIOGRAM COMPLETE: CPT | Performed by: INTERNAL MEDICINE

## 2024-02-06 PROCEDURE — 1160F RVW MEDS BY RX/DR IN RCRD: CPT | Performed by: INTERNAL MEDICINE

## 2024-02-06 NOTE — PROGRESS NOTES
Date of Office Visit: 2024  Encounter Provider: Juliann Lagos MD  Place of Service: [facility] CARDIOLOGY  Patient Name: Dennis Ryan  :1945    Chief complaint  CAD, LVH    History of Present Illness  The patient is a 78-year-old gentleman with history of hypertension and hyperlipidemia who was diagnosed in  with modest disease of the LAD.  He has been seen intermittently since then.  He had an echocardiogram in  which revealed normal systolic function with left ventricular hypertrophy and mild mitral regurgitation.  In  he also wore an event monitor which showed no arrhythmia he had a stress echocardiogram on 2019 that showed normal systolic function mild left ventricular hypertrophy, mild valve regurgitation with aortic valve calcification and no significant ischemia at 8 METS.  Doppler ultrasound 10/2021 showed mild bilateral carotid plaque unchanged from 2019.    Since last visit he is walking 4 miles in 1 hour and 20 minutes.  He occasionally has palpitations in the evening.  These are mild.  He denies any shortness of breath edema dizziness chest pain.  He continues to have edema of his right leg and is seen by vascular surgery for this.  Blood pressure at home checked sporadically has been lower typically in the 1 teens over 60s.    Past Medical History:   Diagnosis Date    Cardiovascular risk factor     No diabetes.  No hypertension.  Positive for hyperlipidemia.  Positive for family history of coronary artery disease with father with CAD age 60s, mother with heart problem at age 84, brother and sister x2 with hypertension    Coronary artery disease     Heart palpitations     Hyperlipidemia     Hypertension     Kidney stone     Left ventricular hypertrophy     Palpitations      Past Surgical History:   Procedure Laterality Date    APPENDECTOMY      CORONARY ANGIOPLASTY WITH STENT PLACEMENT      Per procedure note: stent not placed.    LAD 50%  occlusion    CYSTOSCOPY W/ URETERAL STENT PLACEMENT Left 2018    Procedure: CYSTOSCOPY, LEFT STENT PLACEMENT;  Surgeon: Jaydon Pereyra Jr., MD;  Location: [facility];  Service: Urology    HERNIA REPAIR      OTHER SURGICAL HISTORY      groin rupture     PROSTATE SURGERY       Outpatient Medications Prior to Visit   Medication Sig Dispense Refill    aspirin 81 MG EC tablet Take 2 tablets by mouth Daily. 180 tablet 1    atorvastatin (LIPITOR) 20 MG tablet TAKE 1 TABLET BY MOUTH DAILY 90 tablet 0    ramipril (ALTACE) 5 MG capsule TAKE 1 CAPSULE BY MOUTH DAILY 90 capsule 0     No facility-administered medications prior to visit.       Allergies as of 2024    (No Known Allergies)     Social History     Socioeconomic History    Marital status: Single   Tobacco Use    Smoking status: Former     Packs/day: 1.00     Years: 10.00     Additional pack years: 0.00     Total pack years: 10.00     Types: Cigarettes     Start date: 1958     Quit date: 1967     Years since quittin.0     Passive exposure: Never    Smokeless tobacco: Never   Vaping Use    Vaping Use: Never used   Substance and Sexual Activity    Alcohol use: No     Comment: No caffeine use    Drug use: No     Family History   Problem Relation Age of Onset    Other Mother         CARDIAC DISORDER    Other Father     Stroke Father         CEREBROVASCULAR    Heart disease Father     Diabetes Father     Hypertension Other     Cancer Sister      Review of Systems   Constitutional: Negative for chills, fever and weight loss.   Cardiovascular:  Negative for leg swelling.   Respiratory:  Negative for cough, snoring and wheezing.    Hematologic/Lymphatic: Negative for bleeding problem. Does not bruise/bleed easily.   Skin:  Negative for color change.   Musculoskeletal:  Negative for falls, joint pain and myalgias.   Gastrointestinal:  Negative for melena.   Genitourinary:  Negative for hematuria.   Neurological:  Negative for excessive daytime  "sleepiness.   Psychiatric/Behavioral:  Negative for depression. The patient is not nervous/anxious.         Objective:     Vitals:    02/06/24 1250   BP: 144/84   Pulse: 62   Weight: 87.5 kg (193 lb)   Height: 175.3 cm (69\")     Body mass index is 28.5 kg/m².    Vitals reviewed.   Constitutional:       Appearance: Well-developed.   Eyes:      General: No scleral icterus.        Right eye: No discharge.      Conjunctiva/sclera: Conjunctivae normal.      Pupils: Pupils are equal, round, and reactive to light.   HENT:      Head: Normocephalic.      Nose: Nose normal.   Neck:      Thyroid: No thyromegaly.      Vascular: No JVD.   Pulmonary:      Effort: Pulmonary effort is normal. No respiratory distress.      Breath sounds: Normal breath sounds. No wheezing. No rales.   Cardiovascular:      Normal rate. Regular rhythm. Normal S1. Normal S2.       Murmurs: There is no murmur.      No gallop.    Pulses:     Intact distal pulses.      Carotid: 2+ bilaterally.     Radial: 2+ bilaterally.     Femoral: 2+ bilaterally.     Popliteal: 2+ bilaterally.     Dorsalis pedis: 2+ bilaterally.     Posterior tibial: 2+ bilaterally.  Edema:     Peripheral edema absent.   Abdominal:      General: Bowel sounds are normal. There is no distension.      Palpations: Abdomen is soft.      Tenderness: There is no abdominal tenderness. There is no rebound.   Musculoskeletal: Normal range of motion.         General: No tenderness.      Cervical back: Normal range of motion and neck supple. Skin:     General: Skin is warm and dry.      Findings: No erythema or rash.   Neurological:      Mental Status: Alert and oriented to person, place, and time.   Psychiatric:         Behavior: Behavior normal.         Thought Content: Thought content normal.         Judgment: Judgment normal.       Lab Review:   Lab Results - Last 18 Months   Lab Units 05/31/23  1358 11/29/22  1436   WBC 10*3/mm3 6.51 7.07   RBC 10*6/mm3 5.81* 5.79   HEMOGLOBIN g/dL 16.4 16.3 "   HEMATOCRIT % 48.8 50.2   MCV fL 84.0 86.7   MCH pg 28.2 28.2   MCHC g/dL 33.6 32.5   RDW % 12.9 13.2   PLATELETS 10*3/mm3 211 201       Lab Results - Last 18 Months   Lab Units 05/31/23  1358 11/29/22  1436   GLUCOSE mg/dL 105* 105*   BUN mg/dL 26* 18   CREATININE mg/dL 1.02 0.93   SODIUM mmol/L 140 139   POTASSIUM mmol/L 5.5* 4.8   CHLORIDE mmol/L 104 104   CO2 mmol/L 26.0 23.8   CALCIUM mg/dL 9.9 9.5   ALBUMIN g/dL 4.5 4.40   ALT (SGPT) U/L 27 23   AST (SGOT) U/L 24 21   ALK PHOS U/L 76 73   BILIRUBIN mg/dL 0.4 0.4   BUN / CREAT RATIO  25.5* 19.4     Lab Results - Last 18 Months   Lab Units 05/31/23  1358 11/29/22  1436   TRIGLYCERIDES mg/dL 53 75   HDL CHOL mg/dL 49 47   LDL CHOL mg/dL 85 89   VLDL CHOLESTEROL SCARLETT mg/dL 11 15   LDL/HDL RATIO  1.74 1.89       Lab Results - Last 18 Months   Lab Units 05/31/23  1358   TSH uIU/mL 1.890         ECG 12 Lead    Date/Time: 2/6/2024 1:04 PM  Performed by: Juliann Lagos MD    Authorized by: Juliann Lagos MD  Comparison: compared with previous ECG   Similar to previous ECG  Rhythm: sinus rhythm  Conduction: 1st degree AV block    Clinical impression: abnormal EKG            Diagnosis Plan   1. LVH (left ventricular hypertrophy)  ECG 12 Lead    Adult Transthoracic Echo Complete W/ Cont if Necessary Per Protocol      2. Hypertension with heart disease        3. Coronary artery disease involving native coronary artery of native heart without angina pectoris          Plan:       1.  Modest coronary artery disease.  Negative stress echo 2019 and no anginal symptoms.   Continue risk factor modification and follow-up in 6 months.  2.  Hypertension with hypertensive heart disease.  Will check an echocardiogram  3.  Carotid artery disease with plaque noted bilaterally 10/2021.  Check follow-up carotid Doppler ultrasound  4.  Palpitations.  Patient had a 30-day monitor without arrhythmia in 2016.  Multiple orders for Holter's have been placed since then though not completed.  Will  have him check his blood pressure with these events.  He will monitor how long they last.  Can consider repeat monitoring accordingly.  5.  Elevated glucose,.  Recommendations per SEPIDEH Tyler  6.  Hyperlipidemia.  LDL slightly elevated.  He will make additional dietary changes.      Time Spent: I spent 35 minutes caring for Dennis on this date of service. This time includes time spent by me in the following activities: preparing for the visit, reviewing tests, obtaining and/or reviewing a separately obtained history, performing a medically appropriate examination and/or evaluation, counseling and educating the patient/family/caregiver, ordering medications, tests, or procedures, documenting information in the medical record, and independently interpreting results and communicating that information with the patient/family/caregiver.   I spent 1 minutes on the separately reported service of ECG. This time is not included in the time used to support the E/M service also reported today.        Your medication list            Accurate as of February 6, 2024 11:59 PM. If you have any questions, ask your nurse or doctor.                CONTINUE taking these medications        Instructions Last Dose Given Next Dose Due   aspirin 81 MG EC tablet      Take 2 tablets by mouth Daily.       atorvastatin 20 MG tablet  Commonly known as: LIPITOR      TAKE 1 TABLET BY MOUTH DAILY       ramipril 5 MG capsule  Commonly known as: ALTACE      TAKE 1 CAPSULE BY MOUTH DAILY                Patient is no longer taking -.  I corrected the med list to reflect this.  I did not stop these medications.      Dictated utilizing Dragon dictation

## 2024-02-06 NOTE — TELEPHONE ENCOUNTER
Please remind patient to check his blood pressure in the evening hours when he is having palpitations.  Please find out how often and how long are these events.

## 2024-02-07 NOTE — TELEPHONE ENCOUNTER
Pt called back in.  States that he has the irregular heartbeat/palpitations throughout the day too, but he's just more aware of them in the evening.  I asked that he please monitor and log HR/BP in the evenings when these symptoms occur.  Pt verbalized understanding.    Garima Galarza, RN  Triage RN  02/07/24 09:08 EST

## 2024-02-07 NOTE — TELEPHONE ENCOUNTER
I tried to call Dennis Ryan but there was no answer.  Left a voicemail asking patient to call back.  Will continue to try to reach pt.    HUB- if pt calls back, please transfer through to triage.    Thank you,    Candis FONTANEZ RN  Triage [facility] [place]  02/07/24 08:38 EST

## 2024-03-20 ENCOUNTER — HOSPITAL ENCOUNTER (OUTPATIENT)
Dept: CARDIOLOGY | Facility: HOSPITAL | Age: 79
Discharge: HOME | End: 2024-03-20
Admitting: INTERNAL MEDICINE
Payer: MEDICARE

## 2024-03-20 VITALS
BODY MASS INDEX: 28.58 KG/M2 | WEIGHT: 193 LBS | SYSTOLIC BLOOD PRESSURE: 152 MMHG | HEART RATE: 75 BPM | DIASTOLIC BLOOD PRESSURE: 72 MMHG | HEIGHT: 69 IN

## 2024-03-20 DIAGNOSIS — I51.7 LVH (LEFT VENTRICULAR HYPERTROPHY): ICD-10-CM

## 2024-03-20 LAB
AORTIC ARCH: 2.6 CM
ASCENDING AORTA: 3.2 CM
BH CV ECHO MEAS - ACS: 2.5 CM
BH CV ECHO MEAS - AI P1/2T: 481 MSEC
BH CV ECHO MEAS - AO MAX PG: 4.8 MMHG
BH CV ECHO MEAS - AO MEAN PG: 3 MMHG
BH CV ECHO MEAS - AO ROOT DIAM: 3.7 CM
BH CV ECHO MEAS - AO V2 MAX: 110 CM/SEC
BH CV ECHO MEAS - AO V2 VTI: 21.7 CM
BH CV ECHO MEAS - AVA(I,D): 4.7 CM2
BH CV ECHO MEAS - EDV(CUBED): 74.1 ML
BH CV ECHO MEAS - EDV(MOD-SP2): 126 ML
BH CV ECHO MEAS - EDV(MOD-SP4): 114 ML
BH CV ECHO MEAS - EF(MOD-BP): 56.3 %
BH CV ECHO MEAS - EF(MOD-SP2): 54 %
BH CV ECHO MEAS - EF(MOD-SP4): 57.9 %
BH CV ECHO MEAS - ESV(CUBED): 17.6 ML
BH CV ECHO MEAS - ESV(MOD-SP2): 58 ML
BH CV ECHO MEAS - ESV(MOD-SP4): 48 ML
BH CV ECHO MEAS - FS: 38.1 %
BH CV ECHO MEAS - IVS/LVPW: 0.86 CM
BH CV ECHO MEAS - IVSD: 1.2 CM
BH CV ECHO MEAS - LAT PEAK E' VEL: 7.1 CM/SEC
BH CV ECHO MEAS - LV DIASTOLIC VOL/BSA (35-75): 56 CM2
BH CV ECHO MEAS - LV MASS(C)D: 200.6 GRAMS
BH CV ECHO MEAS - LV MAX PG: 4.2 MMHG
BH CV ECHO MEAS - LV MEAN PG: 2 MMHG
BH CV ECHO MEAS - LV SYSTOLIC VOL/BSA (12-30): 23.6 CM2
BH CV ECHO MEAS - LV V1 MAX: 103 CM/SEC
BH CV ECHO MEAS - LV V1 VTI: 19.6 CM
BH CV ECHO MEAS - LVIDD: 4.2 CM
BH CV ECHO MEAS - LVIDS: 2.6 CM
BH CV ECHO MEAS - LVOT AREA: 5.2 CM2
BH CV ECHO MEAS - LVOT DIAM: 2.6 CM
BH CV ECHO MEAS - LVPWD: 1.4 CM
BH CV ECHO MEAS - MED PEAK E' VEL: 3 CM/SEC
BH CV ECHO MEAS - MR MAX PG: 14.8 MMHG
BH CV ECHO MEAS - MR MAX VEL: 192.1 CM/SEC
BH CV ECHO MEAS - MV A DUR: 0.13 SEC
BH CV ECHO MEAS - MV A MAX VEL: 86.3 CM/SEC
BH CV ECHO MEAS - MV DEC SLOPE: 300.8 CM/SEC2
BH CV ECHO MEAS - MV DEC TIME: 0.25 SEC
BH CV ECHO MEAS - MV E MAX VEL: 72.3 CM/SEC
BH CV ECHO MEAS - MV E/A: 0.84
BH CV ECHO MEAS - MV MAX PG: 3 MMHG
BH CV ECHO MEAS - MV MEAN PG: 1.71 MMHG
BH CV ECHO MEAS - MV P1/2T: 74.5 MSEC
BH CV ECHO MEAS - MV V2 VTI: 22.1 CM
BH CV ECHO MEAS - MVA(P1/2T): 3 CM2
BH CV ECHO MEAS - MVA(VTI): 4.6 CM2
BH CV ECHO MEAS - PA ACC TIME: 0.07 SEC
BH CV ECHO MEAS - PA V2 MAX: 90 CM/SEC
BH CV ECHO MEAS - PULM A REVS DUR: 0.16 SEC
BH CV ECHO MEAS - PULM A REVS VEL: 44.6 CM/SEC
BH CV ECHO MEAS - PULM DIAS VEL: 53.8 CM/SEC
BH CV ECHO MEAS - PULM S/D: 0.96
BH CV ECHO MEAS - PULM SYS VEL: 51.4 CM/SEC
BH CV ECHO MEAS - RV MAX PG: 1.42 MMHG
BH CV ECHO MEAS - RV V1 MAX: 59.6 CM/SEC
BH CV ECHO MEAS - RV V1 VTI: 13.8 CM
BH CV ECHO MEAS - SI(MOD-SP2): 33.4 ML/M2
BH CV ECHO MEAS - SI(MOD-SP4): 32.4 ML/M2
BH CV ECHO MEAS - SUP REN AO DIAM: 2.4 CM
BH CV ECHO MEAS - SV(LVOT): 101.6 ML
BH CV ECHO MEAS - SV(MOD-SP2): 68 ML
BH CV ECHO MEAS - SV(MOD-SP4): 66 ML
BH CV ECHO MEAS - TAPSE (>1.6): 2.2 CM
BH CV ECHO MEASUREMENTS AVERAGE E/E' RATIO: 14.32
BH CV XLRA - RV BASE: 3.7 CM
BH CV XLRA - RV LENGTH: 7.2 CM
BH CV XLRA - RV MID: 3.2 CM
BH CV XLRA - TDI S': 11.6 CM/SEC
LEFT ATRIUM VOLUME INDEX: 28.1 ML/M2
SINUS: 3.7 CM
STJ: 2.5 CM

## 2024-03-20 PROCEDURE — 93306 TTE W/DOPPLER COMPLETE: CPT

## 2024-03-21 ENCOUNTER — TELEPHONE (OUTPATIENT)
Dept: CARDIOLOGY | Facility: CLINIC | Age: 79
End: 2024-03-21
Payer: MEDICARE

## 2024-03-21 DIAGNOSIS — I10 ESSENTIAL HYPERTENSION: ICD-10-CM

## 2024-03-21 NOTE — TELEPHONE ENCOUNTER
Pt called back in.  Results and recommendations reviewed with pt.  Instructed to call with any further questions or concerns.  Verbalized understanding.    Garima Galarza RN  Triage Nurse, [facility] [place]  03/21/24 11:13 EDT,

## 2024-03-21 NOTE — TELEPHONE ENCOUNTER
Please let him know that echo looks good.  His heart is strong and functioning well.  There is a mild amount of leakage from his aortic and mitral valves.  This is slightly worse since prior echo done almost 5 years ago, but nothing that needs intervention at this time.  Would continue to monitor blood pressure at home with goal BP less than 130/80.  Continue current medications and keep currently scheduled follow-up appointment

## 2024-03-21 NOTE — TELEPHONE ENCOUNTER
Called and left VM, will continue to try to reach pt.    HUB- please put patient straight through to triage    Garima Galarza, RN  Triage RN  03/21/24 08:48 EDT

## 2024-03-22 ENCOUNTER — TELEPHONE (OUTPATIENT)
Dept: INTERNAL MEDICINE | Facility: CLINIC | Age: 79
End: 2024-03-22
Payer: MEDICARE

## 2024-03-22 RX ORDER — RAMIPRIL 5 MG/1
5 CAPSULE ORAL DAILY
Qty: 90 CAPSULE | Refills: 0 | Status: SHIPPED | OUTPATIENT
Start: 2024-03-22

## 2024-03-22 RX ORDER — ATORVASTATIN CALCIUM 20 MG/1
20 TABLET, FILM COATED ORAL DAILY
Qty: 90 TABLET | Refills: 0 | Status: SHIPPED | OUTPATIENT
Start: 2024-03-22

## 2024-03-22 NOTE — TELEPHONE ENCOUNTER
Patient called and set up an appt and needs prescription filled. Can his medication be sent now that he set up the follow up? Thank you

## 2024-03-22 NOTE — TELEPHONE ENCOUNTER
Rx Refill Note  Requested Prescriptions     Pending Prescriptions Disp Refills    ramipril (ALTACE) 5 MG capsule [Pharmacy Med Name: RAMIPRIL 5 MG CAPSULE] 90 capsule 0     Sig: TAKE 1 CAPSULE BY MOUTH DAILY    atorvastatin (LIPITOR) 20 MG tablet [Pharmacy Med Name: ATORVASTATIN 20 MG TABLET] 90 tablet 0     Sig: TAKE 1 TABLET BY MOUTH DAILY      Last office visit with prescribing clinician: 6/22/2023  Next office visit with prescribing clinician: 3/22/2024         Zulma Hernández MA  03/22/24, 11:49 EDT

## 2024-03-22 NOTE — TELEPHONE ENCOUNTER
Rx Refill Note  Requested Prescriptions     Pending Prescriptions Disp Refills    ramipril (ALTACE) 5 MG capsule [Pharmacy Med Name: RAMIPRIL 5 MG CAPSULE] 90 capsule 0     Sig: TAKE 1 CAPSULE BY MOUTH DAILY    atorvastatin (LIPITOR) 20 MG tablet [Pharmacy Med Name: ATORVASTATIN 20 MG TABLET] 90 tablet 0     Sig: TAKE 1 TABLET BY MOUTH DAILY      Last office visit with prescribing clinician: 6/22/2023  Next office visit with prescribing clinician: Visit date not found         Zulma Hernández MA  03/22/24, 09:28 EDT      Left Voice Mail for patient to schedule a 6 month follow up.      Return in about 6 months (around 11/30/2023).    HUB OKAY TO READ

## 2024-04-03 ENCOUNTER — OFFICE VISIT (OUTPATIENT)
Dept: INTERNAL MEDICINE | Facility: CLINIC | Age: 79
End: 2024-04-03
Payer: MEDICARE

## 2024-04-03 VITALS
DIASTOLIC BLOOD PRESSURE: 82 MMHG | SYSTOLIC BLOOD PRESSURE: 144 MMHG | WEIGHT: 195 LBS | HEIGHT: 69 IN | HEART RATE: 73 BPM | BODY MASS INDEX: 28.88 KG/M2 | OXYGEN SATURATION: 99 %

## 2024-04-03 DIAGNOSIS — R73.09 ELEVATED GLUCOSE: ICD-10-CM

## 2024-04-03 DIAGNOSIS — E78.2 MIXED HYPERLIPIDEMIA: Chronic | ICD-10-CM

## 2024-04-03 DIAGNOSIS — I10 ESSENTIAL HYPERTENSION: Primary | Chronic | ICD-10-CM

## 2024-04-03 DIAGNOSIS — I25.10 CORONARY ARTERY DISEASE INVOLVING NATIVE CORONARY ARTERY OF NATIVE HEART WITHOUT ANGINA PECTORIS: Chronic | ICD-10-CM

## 2024-04-03 LAB
ALBUMIN SERPL-MCNC: 4.5 G/DL (ref 3.5–5.2)
ALBUMIN/GLOB SERPL: 1.7 G/DL
ALP SERPL-CCNC: 74 U/L (ref 39–117)
ALT SERPL-CCNC: 22 U/L (ref 1–41)
AST SERPL-CCNC: 20 U/L (ref 1–40)
BILIRUB SERPL-MCNC: 0.5 MG/DL (ref 0–1.2)
BUN SERPL-MCNC: 19 MG/DL (ref 8–23)
BUN/CREAT SERPL: 18.4 (ref 7–25)
CALCIUM SERPL-MCNC: 9.5 MG/DL (ref 8.6–10.5)
CHLORIDE SERPL-SCNC: 106 MMOL/L (ref 98–107)
CHOLEST SERPL-MCNC: 145 MG/DL (ref 0–200)
CO2 SERPL-SCNC: 25.4 MMOL/L (ref 22–29)
CREAT SERPL-MCNC: 1.03 MG/DL (ref 0.76–1.27)
EGFRCR SERPLBLD CKD-EPI 2021: 73.9 ML/MIN/1.73
GLOBULIN SER CALC-MCNC: 2.6 GM/DL
GLUCOSE SERPL-MCNC: 103 MG/DL (ref 65–99)
HBA1C MFR BLD: 5.8 % (ref 4.8–5.6)
HDLC SERPL-MCNC: 40 MG/DL (ref 40–60)
LDLC SERPL CALC-MCNC: 88 MG/DL (ref 0–100)
LDLC/HDLC SERPL: 2.18 {RATIO}
POTASSIUM SERPL-SCNC: 5 MMOL/L (ref 3.5–5.2)
PROT SERPL-MCNC: 7.1 G/DL (ref 6–8.5)
SODIUM SERPL-SCNC: 139 MMOL/L (ref 136–145)
TRIGL SERPL-MCNC: 90 MG/DL (ref 0–150)
VLDLC SERPL CALC-MCNC: 17 MG/DL (ref 5–40)

## 2024-04-03 NOTE — ASSESSMENT & PLAN NOTE
Patient states Ivon did not want to increase statin.   Last note from Dr Lagos - work on diet.     He declined increase in statin at last visit.   He is fasting today, has increased fiber.   Will recheck today.

## 2024-04-03 NOTE — PROGRESS NOTES
Chief Complaint  Hypertension (6 month follow up)     Subjective:      History of Present Illness {CC  Problem List  Visit  Diagnosis   Encounters  Notes  Medications  Labs  Result Review Imaging  Media :23}     Dennis Ryan presents to [facility] for:  hypertension, hyperlipidemia, CAD (2003: modest LAD), carotid stenosis (last doppler: 3/16/23: less than 50% stenosis), hx kidney stones (continues yearly follow up with urology - now ISAÍAS León)      Hypertension: chronic and continues ramipril   No CP, SOA    Today states BP average at home 120/65  States he feels he has white coat syndrome.      CAD: continues statin and ASA  He saw Dr Lagos on 2/6/24:     Hyperlipidemia:   LV: advised to increase statin to 40 mg daily and he declined stating cardiology did not want him to adjust dose.   , LDL 85      Followed by vascular for right lower extremity edema.   Varicose veins: thigh sleeve and compression garment.     Glucose elevated: 105  States he has been working on diet. Increased fiber and limiting carbs.     Exercise:   3-4 times a week: brisk walks 3-4 miles: no CP, SOA       I have reviewed patient's medical history, any new submitted information provided by patient or medical assistant and updated medical record.      Objective:      Physical Exam  Vitals reviewed.   Constitutional:       Appearance: Normal appearance. He is well-developed.   Neck:      Thyroid: No thyromegaly.   Cardiovascular:      Rate and Rhythm: Normal rate and regular rhythm.      Pulses: Normal pulses.      Heart sounds: Normal heart sounds.   Pulmonary:      Effort: Pulmonary effort is normal.      Breath sounds: Normal breath sounds.      Comments: E/U   Lymphadenopathy:      Cervical: No cervical adenopathy.   Skin:     General: Skin is warm and dry.   Neurological:      Mental Status: He is alert and oriented to person, place, and time.   Psychiatric:         Behavior: Behavior  "is cooperative.        Result Review  Data Reviewed:{ Labs  Result Review  Imaging  Med Tab  Media :23}     The following data was reviewed by: Steve Tyler III, NP-C on 04/03/2024  Common labs          5/31/2023    13:58   Common Labs   Glucose 105    BUN 26    Creatinine 1.02    Sodium 140    Potassium 5.5    Chloride 104    Calcium 9.9    Total Protein 7.3    Albumin 4.5    Total Bilirubin 0.4    Alkaline Phosphatase 76    AST (SGOT) 24    ALT (SGPT) 27    WBC 6.51    Hemoglobin 16.4    Hematocrit 48.8    Platelets 211    Total Cholesterol 145    Triglycerides 53    HDL Cholesterol 49    LDL Cholesterol  85             Vital Signs:   /82 (BP Location: Left arm, Patient Position: Sitting, Cuff Size: Adult)   Pulse 73   Ht 175.3 cm (69\")   Wt 88.5 kg (195 lb)   SpO2 99%   BMI 28.80 kg/m²         Requested Prescriptions      No prescriptions requested or ordered in this encounter       Routine medications provided by this office will also be refilled via pharmacy request.       Current Outpatient Medications:     aspirin 81 MG EC tablet, Take 2 tablets by mouth Daily., Disp: 180 tablet, Rfl: 1    atorvastatin (LIPITOR) 20 MG tablet, TAKE 1 TABLET BY MOUTH DAILY, Disp: 90 tablet, Rfl: 0    ramipril (ALTACE) 5 MG capsule, TAKE 1 CAPSULE BY MOUTH DAILY, Disp: 90 capsule, Rfl: 0     Assessment and Plan:      Assessment and Plan {CC Problem List  Visit Diagnosis  ROS  Review (Popup)  Health Maintenance  Quality  BestPractice  Medications  SmartSets  SnapShot Encounters  Media :23}     Problem List Items Addressed This Visit          Cardiac and Vasculature    Coronary artery disease involving native coronary artery of native heart without angina pectoris (Chronic)    Overview     2003: heart cath: 50% LAD: medication management only   RF: former smoker   FH: Mother: heart disease         Relevant Orders    Lipid Panel With LDL / HDL Ratio    Comprehensive Metabolic Panel    " Essential hypertension - Primary (Chronic)    Current Assessment & Plan     Bps elevated in office - patient states at home, well controlled.   No symptoms.   Continue routine exercise and low sodium diet.          Relevant Orders    Hemoglobin A1c    Hyperlipidemia (Chronic)    Overview     Lipitor since 2003          Current Assessment & Plan     Patient states Ivon did not want to increase statin.   Last note from Dr Lagos - work on diet.     He declined increase in statin at last visit.   He is fasting today, has increased fiber.   Will recheck today.             Relevant Orders    Lipid Panel With LDL / HDL Ratio    Comprehensive Metabolic Panel       Endocrine and Metabolic    Elevated glucose    Current Assessment & Plan     Continue diet modification.   Will check A1C today.          Relevant Orders    Hemoglobin A1c       Follow Up {Instructions Charge Capture  Follow-up Communications :23}     Return in about 6 months (around 10/3/2024) for Medicare Wellness.      Patient was given instructions and counseling regarding his condition or for health maintenance advice. Please see specific information pulled into the AVS if appropriate.    Jacquelineon disclaimer:   Much of this encounter note is an electronic transcription/translation of spoken language to printed text. The electronic translation of spoken language may permit erroneous, or at times, nonsensical words or phrases to be inadvertently transcribed; Although I have reviewed the note for such errors, some may still exist.     Additional Patient Counseling:       There are no Patient Instructions on file for this visit.

## 2024-04-03 NOTE — ASSESSMENT & PLAN NOTE
Bps elevated in office - patient states at home, well controlled.   No symptoms.   Continue routine exercise and low sodium diet.

## 2024-04-10 ENCOUNTER — TELEPHONE (OUTPATIENT)
Dept: INTERNAL MEDICINE | Facility: CLINIC | Age: 79
End: 2024-04-10
Payer: MEDICARE

## 2024-04-10 NOTE — TELEPHONE ENCOUNTER
Caller: Dennis Ryan    Relationship: Self    Best call back number: [phone]    Caller requesting test results: SELF    What test was performed: LABS    When was the test performed: 4/3/24    Where was the test performed: IN OFFICE    Additional notes: STATED THAT THEY HAVE NOT HEARD ANYTHING ABOUT THE LABS THEY HAD DONE. PLEASE CALL AND ADVISE

## 2024-04-29 ENCOUNTER — TELEPHONE (OUTPATIENT)
Dept: CARDIOLOGY | Facility: CLINIC | Age: 79
End: 2024-04-29
Payer: MEDICARE

## 2024-04-29 DIAGNOSIS — R42 DIZZINESS: Primary | ICD-10-CM

## 2024-04-29 NOTE — TELEPHONE ENCOUNTER
Caller: Dennis Ryan    Relationship to patient: Self    Best call back number: [phone]    Patient is needing: PT HAD  A FAINTING SPELL OVER THE WEEKEND. WANTS TO KNOW WHAT DR. SHETTY WOULD LIKE HIM TO DO FROM HERE. HAS HAD NO ISSUES OR OTHER SYMPTOMS SINCE.

## 2024-04-29 NOTE — TELEPHONE ENCOUNTER
Pt said he went on his usual walk on Saturday and did very well.    He came home and felt faint.      He did not take his BP at the time.    No palpitations (5 hard beats right before the episode)  No SOA  No Chest pain  No edema  No dizziness  No fatigue    Sundays BP was 114/66 with pulse 68.  Current BP is 137/80 with 71    Current BP Meds:  Ramipril 5mg in the PM

## 2024-04-30 NOTE — TELEPHONE ENCOUNTER
Multiple possible etiologies including low blood pressure or low blood sugar.  Most looks like blood pressure was lower.  Make sure he is staying hydrated when he goes for his walk and try to avoid going out when it is hot.  In the meanwhile he had recent blood work done that looked relatively normal with normal hemoglobin.  I had previously asked him to do monitoring test and this has not been completed.  Will place another monitor for 1 week and have him set up a follow-up appointment to see Genoveva or me in 2 weeks time.  Have him monitor his blood pressure heart rate.  Also find out if he felt faint while he was walking or after he returned home.  Tell him not to do a lot of exercise in the interim since oRss now on the appointment.  Taking do household activities but try to minimize exercise

## 2024-05-01 NOTE — TELEPHONE ENCOUNTER
I spoke with Dennis Ryan and gave them message from the provider.  They verbalized understanding & have no further questions at this time.  I scheduled his FU appt with SW on 5/16/24.    Dr. Lagos,  Pt says he only felt faint once he returned from his walk, but not during     Scheduling,  Please set up pt for his holter monitor.    Thank you,    Candis FONTANEZ , RN  Triage [facility] [place] [place]  05/01/24 09:46 EDT

## 2024-05-01 NOTE — TELEPHONE ENCOUNTER
I tried to call Dennis Ryan but there was no answer.  Left a voicemail asking patient to call back.  Will continue to try to reach pt.    HUB- if pt calls back, please transfer through to triage.    Thank you,    Candis FONTANEZ RN  Triage [facility] [place] [facility] [place]  05/01/24 08:39 EDT

## 2024-05-02 NOTE — TELEPHONE ENCOUNTER
5/2/24    Message sent to pre-cert team for authorization of monitor. Will call to schedule when it is approved.

## 2024-05-08 ENCOUNTER — TELEPHONE (OUTPATIENT)
Dept: CARDIOLOGY | Facility: CLINIC | Age: 79
End: 2024-05-08
Payer: MEDICARE

## 2024-05-31 ENCOUNTER — TELEPHONE (OUTPATIENT)
Dept: CARDIOLOGY | Facility: CLINIC | Age: 79
End: 2024-05-31
Payer: MEDICARE

## 2024-05-31 NOTE — TELEPHONE ENCOUNTER
Please let him know that Holter monitor looked good overall.  Average heart rate was 63 bpm.  There were 8 episodes of fast heartbeats from the top of the heart and 1 episode of fast heartbeats from the bottom of the heart but these were very brief.  Would make sure he is avoiding stimulants such as caffeine, alcohol, chocolate or stimulant medications such as decongestants and nasal sprays.  Would continue current medications and keep currently scheduled follow-up appointment.

## 2024-06-03 NOTE — TELEPHONE ENCOUNTER
Called and left VM, will continue to try to reach pt.    HUB- please put patient straight through to triage    Garima Galarza, RN  Triage RN  06/03/24 08:23 EDT

## 2024-06-03 NOTE — TELEPHONE ENCOUNTER
I spoke with Dennis Ryan and updated pt on results/recommendations from provider.  Pt verbalized understanding and has no further questions at this time.    Thank you,    Candis FONTANEZ, RN  [place] [facility] [place]  06/03/24 10:25 EDT

## 2024-06-06 ENCOUNTER — OFFICE VISIT (OUTPATIENT)
Dept: CARDIOLOGY | Facility: CLINIC | Age: 79
End: 2024-06-06
Payer: MEDICARE

## 2024-06-06 VITALS
HEART RATE: 68 BPM | BODY MASS INDEX: 28.68 KG/M2 | DIASTOLIC BLOOD PRESSURE: 84 MMHG | SYSTOLIC BLOOD PRESSURE: 142 MMHG | WEIGHT: 193.6 LBS | HEIGHT: 69 IN | OXYGEN SATURATION: 96 %

## 2024-06-06 DIAGNOSIS — I11.9 HYPERTENSION WITH HEART DISEASE: ICD-10-CM

## 2024-06-06 DIAGNOSIS — R42 DIZZINESS: Primary | ICD-10-CM

## 2024-06-06 DIAGNOSIS — R00.2 PALPITATIONS: ICD-10-CM

## 2024-06-06 DIAGNOSIS — I25.10 CORONARY ARTERY DISEASE INVOLVING NATIVE CORONARY ARTERY OF NATIVE HEART WITHOUT ANGINA PECTORIS: ICD-10-CM

## 2024-06-06 DIAGNOSIS — I51.7 LVH (LEFT VENTRICULAR HYPERTROPHY): ICD-10-CM

## 2024-06-06 RX ORDER — MIDODRINE HYDROCHLORIDE 2.5 MG/1
2.5 TABLET ORAL AS NEEDED
Qty: 90 TABLET | Refills: 3 | Status: SHIPPED | OUTPATIENT
Start: 2024-06-06

## 2024-06-06 NOTE — PROGRESS NOTES
Date of Office Visit: 2024  Encounter Provider: SEPIDEH Pratt  Place of Service: [facility] CARDIOLOGY  Patient Name: Dennis Ryan  :1945    Chief complaint  CAD, LVH     History of Present Illness  Patient is a 78 y.o. year old male  patient of Dr. Lagos. Past medical history includes hypertension and hyperlipidemia who was diagnosed in  with modest disease of the LAD.  He has been seen intermittently since then.  He had an echocardiogram in  which revealed normal systolic function with left ventricular hypertrophy and mild mitral regurgitation.  In  he also wore an event monitor which showed no arrhythmia he had a stress echocardiogram on 2019 that showed normal systolic function mild left ventricular hypertrophy, mild valve regurgitation with aortic valve calcification and no significant ischemia at 8 METS.  Doppler ultrasound 10/2021 showed mild bilateral carotid plaque unchanged from 2019.     On 2024 patient had an episode of acute weakness, dizziness, and presyncope.  This occurred after he returned home from a 4 mile walk and had prepared his meal.  He was sitting at the kitchen table.  The episode lasted for about 5 minutes.  He did not lose consciousness and he did not check his blood pressure at the time of the event.  He did not notice any palpitations, chest pain, nausea, or arm/jaw pain with this episode. Subsequent echocardiogram showed LVEF 56.3%, grade 1 diastolic dysfunction, mild calcification of the aortic valve with no stenosis and mild aortic regurgitation as well as mild mitral regurgitation with no stenosis.  Holter monitor showed 8 runs of supraventricular tachycardia and 1 episode of ventricular tachycardia that were brief in nature.  Average heart rate on the monitor was 63 bpm.    Interval history  Patient presents today for routine follow-up.  I will visit with him for the first time today and have reviewed  his medical record.  Since his episode in April, he has overall been doing well though he has not resumed his prior exercise.  He continues to report palpitations that occur once every 6 to 8 weeks and last only a few seconds.  He denies shortness of breath, edema, dizziness, chest pain or chest pressure, fatigue, syncope or presyncope.  He stopped his ramipril on his own about 3 weeks ago and has started checking blood pressure 3 times per day at home and notices that systolic blood pressure ranges from the high 90s to 120.  He drinks around 40 ounces of water per day.  He wears a compression stocking on his right leg per vascular surgery recommendations.    Past Medical History:   Diagnosis Date    Cardiovascular risk factor     No diabetes.  No hypertension.  Positive for hyperlipidemia.  Positive for family history of coronary artery disease with father with CAD age 60s, mother with heart problem at age 84, brother and sister x2 with hypertension    Coronary artery disease     Heart palpitations     Hyperlipidemia     Hypertension     Kidney stone     Left ventricular hypertrophy     Palpitations      Past Surgical History:   Procedure Laterality Date    APPENDECTOMY      CORONARY ANGIOPLASTY WITH STENT PLACEMENT  2003    Per procedure note: stent not placed.    LAD 50% occlusion    CYSTOSCOPY W/ URETERAL STENT PLACEMENT Left 06/17/2018    Procedure: CYSTOSCOPY, LEFT STENT PLACEMENT;  Surgeon: Jaydon Pereyra Jr., MD;  Location: [facility];  Service: Urology    HERNIA REPAIR      OTHER SURGICAL HISTORY      groin rupture     PROSTATE SURGERY       Outpatient Medications Prior to Visit   Medication Sig Dispense Refill    aspirin 81 MG EC tablet Take 2 tablets by mouth Daily. 180 tablet 1    atorvastatin (LIPITOR) 20 MG tablet TAKE 1 TABLET BY MOUTH DAILY 90 tablet 0    ramipril (ALTACE) 5 MG capsule TAKE 1 CAPSULE BY MOUTH DAILY (Patient not taking: Reported on 6/6/2024) 90 capsule 0     No  "facility-administered medications prior to visit.       Allergies as of 2024    (No Known Allergies)     Social History     Socioeconomic History    Marital status: Single   Tobacco Use    Smoking status: Former     Current packs/day: 0.00     Average packs/day: 1 pack/day for 10.0 years (10.0 ttl pk-yrs)     Types: Cigarettes     Start date: 1958     Quit date: 1967     Years since quittin.3     Passive exposure: Never    Smokeless tobacco: Never   Vaping Use    Vaping status: Never Used   Substance and Sexual Activity    Alcohol use: No     Comment: No caffeine use    Drug use: No    Sexual activity: Defer     Family History   Problem Relation Age of Onset    Other Mother         CARDIAC DISORDER    Other Father     Stroke Father         CEREBROVASCULAR    Heart disease Father     Diabetes Father     Hypertension Other     Cancer Sister      Review of Systems   Constitutional: Negative for malaise/fatigue.   Cardiovascular:  Positive for palpitations. Negative for chest pain, claudication, dyspnea on exertion, leg swelling, near-syncope, orthopnea, paroxysmal nocturnal dyspnea and syncope.   Respiratory:  Negative for shortness of breath.    Neurological:  Negative for brief paralysis, dizziness, headaches and light-headedness.   All other systems reviewed and are negative.       Objective:     Vitals:    24 1432   BP: 142/84   BP Location: Left arm   Patient Position: Sitting   Cuff Size: Adult   Pulse: 68   SpO2: 96%   Weight: 87.8 kg (193 lb 9.6 oz)   Height: 175.3 cm (69.02\")     Body mass index is 28.58 kg/m².    Vitals reviewed.   Constitutional:       General: Not in acute distress.     Appearance: Well-developed and not in distress. Not diaphoretic.   HENT:      Head: Normocephalic.   Pulmonary:      Effort: Pulmonary effort is normal. No respiratory distress.      Breath sounds: Normal breath sounds. No wheezing. No rhonchi. No rales.   Cardiovascular:      Normal rate. Regular " "rhythm.      Murmurs: There is no murmur.   Pulses:     Radial: 2+ bilaterally.  Edema:     Peripheral edema absent.   Skin:     General: Skin is warm and dry. There is no cyanosis.      Findings: No rash.   Neurological:      Mental Status: Alert and oriented to person, place, and time.   Psychiatric:         Behavior: Behavior normal. Behavior is cooperative.         Thought Content: Thought content normal.         Judgment: Judgment normal.       Lab Review:     Lab Results   Component Value Date     04/03/2024     05/31/2023    K 5.0 04/03/2024    K 5.5 (H) 05/31/2023     04/03/2024     05/31/2023    CO2 25.4 04/03/2024    CO2 26.0 05/31/2023    BUN 19 04/03/2024    BUN 26 (H) 05/31/2023    CREATININE 1.03 04/03/2024    CREATININE 1.02 05/31/2023    EGFRIFNONA 89 04/01/2021    EGFRIFNONA 85 05/21/2019    EGFRIFAFRI 108 04/01/2021    EGFRIFAFRI 103 05/21/2019    GLUCOSE 103 (H) 04/03/2024    GLUCOSE 105 (H) 05/31/2023    CALCIUM 9.5 04/03/2024    CALCIUM 9.9 05/31/2023    PROTENTOTREF 7.1 04/03/2024    PROTENTOTREF 7.3 05/31/2023    ALBUMIN 4.5 04/03/2024    ALBUMIN 4.5 05/31/2023    BILITOT 0.5 04/03/2024    BILITOT 0.4 05/31/2023    AST 20 04/03/2024    AST 24 05/31/2023    ALT 22 04/03/2024    ALT 27 05/31/2023     Lab Results   Component Value Date    WBC 6.51 05/31/2023    WBC 7.07 11/29/2022    HGB 16.4 05/31/2023    HGB 16.3 11/29/2022    HCT 48.8 05/31/2023    HCT 50.2 11/29/2022    MCV 84.0 05/31/2023    MCV 86.7 11/29/2022     05/31/2023     11/29/2022     No results found for: \"PROBNP\", \"BNP\"  Lab Results   Component Value Date    CKTOTAL 119 06/10/2015    TROPONINT <0.010 01/31/2017     Lab Results   Component Value Date    TSH 1.890 05/31/2023    TSH 1.960 04/01/2021      Lipid Panel          4/3/2024    14:25   Lipid Panel   Total Cholesterol 145    Triglycerides 90    HDL Cholesterol 40    VLDL Cholesterol 17    LDL Cholesterol  88    LDL/HDL Ratio 2.18     "       ECG 12 Lead    Date/Time: 6/6/2024 3:25 PM  Performed by: Genoveva Guerra APRN    Authorized by: Genoveva Guerra APRN  Comparison: compared with previous ECG   Similar to previous ECG  Rhythm: sinus rhythm  Rate: normal  BPM: 68  Conduction: 1st degree AV block  QRS axis: right  Comments: Similar to prior        Assessment:       Diagnosis Plan   1. Dizziness        2. LVH (left ventricular hypertrophy)        3. Hypertension with heart disease        4. Coronary artery disease involving native coronary artery of native heart without angina pectoris        5. Palpitations          Plan:       1.  Modest coronary artery disease.  Negative stress echo 2019 and no anginal symptoms.     2.  Hypertension with hypertensive heart disease with recent presyncope and weakness.  Has had low blood pressures recently off ramipril.  With systolic as low as 95 and only reaching around 120 at the highest.  Will start midodrine 2.5 mg 3 times daily as needed for  or less.  I also asked him to drink plenty of water before, during, and after activity as well as eat a snack prior to his exercise.  He will continue to monitor blood pressure 3 times per day and call in several weeks with an update.  3.  Carotid artery disease with plaque noted bilaterally 10/2021.  Repeat carotid Doppler with bilateral plaque and no stenosis.  4.  Palpitations.  Patient had a 30-day monitor without arrhythmia in 2016.  Holter monitor with brief episodes of SVT and 1 episode of NSVT lasting 4 beats.  Overall heart rate is in the low 60s and palpitations are not bothersome to him.  Also with first-degree AV block on EKG today.  Will try to avoid AV qi blocker therapy if possible.  Asked him to let us know if palpitations worsen in frequency or severity and could try low-dose beta-blocker at that time.  5.  Elevated glucose,.  Recommendations per SEPIDEH Jayson  6.  Hyperlipidemia.  LDL slightly elevated.  He will make additional  dietary changes.      Time Spent: I spent 30 minutes caring for Dennis on this date of service. This time includes time spent by me in the following activities: preparing for the visit, reviewing tests, performing a medically appropriate examination and/or evaluations, counseling and educating the patient/family/caregiver, ordering medications, tests, or procedures, documenting information in the medical record, and independently interpreting results and communicating that information with the patient/family/caregiver.   I spent 1 minutes on the separately reported service of ECG. This time is not included in the time used to support the E/M service also reported today.        Your medication list            Accurate as of June 6, 2024  3:29 PM. If you have any questions, ask your nurse or doctor.                START taking these medications        Instructions Last Dose Given Next Dose Due   midodrine 2.5 MG tablet  Commonly known as: PROAMATINE  Started by: SEPIDEH Pratt      Take 1 tablet by mouth As Needed (for  or less).              CONTINUE taking these medications        Instructions Last Dose Given Next Dose Due   aspirin 81 MG EC tablet      Take 2 tablets by mouth Daily.       atorvastatin 20 MG tablet  Commonly known as: LIPITOR      TAKE 1 TABLET BY MOUTH DAILY                 Where to Get Your Medications        These medications were sent to [facility] [id number] - [place], [place] - [address] - [phone]  - [phone]   [address], [place] [address]      Phone: [phone]   midodrine 2.5 MG tablet         Patient is no longer taking -.  I corrected the med list to reflect this.  I did not stop these medications.    Return in about 6 months (around 12/6/2024) for with Dr. Lagos (cancel February 2025 appointment).      Dictated utilizing Dragon dictation

## 2024-06-11 ENCOUNTER — TELEPHONE (OUTPATIENT)
Dept: CARDIOLOGY | Facility: CLINIC | Age: 79
End: 2024-06-11
Payer: MEDICARE

## 2024-06-11 NOTE — TELEPHONE ENCOUNTER
Pt called and LMM re: wanting to leave a message for Genoveva.  I called pt back and LMM re: call to let us know what is going on.

## 2024-06-11 NOTE — TELEPHONE ENCOUNTER
Spoke with pt.  He was having issue getting midodrine.  I called pharmacy and advised that pt can take three times a day if SBP is below 100 per SEPIDEH Sultana's note.  No other questions.  They will get this ready.  Info to pt.  Advised him to call if any other issues.  (Done)

## 2024-06-28 RX ORDER — ATORVASTATIN CALCIUM 20 MG/1
20 TABLET, FILM COATED ORAL DAILY
Qty: 90 TABLET | Refills: 1 | Status: SHIPPED | OUTPATIENT
Start: 2024-06-28

## 2024-06-28 NOTE — TELEPHONE ENCOUNTER
Rx Refill Note  Requested Prescriptions     Pending Prescriptions Disp Refills    atorvastatin (LIPITOR) 20 MG tablet 90 tablet 0     Sig: Take 1 tablet by mouth Daily.      Last office visit with prescribing clinician: 4/3/2024  Next office visit with prescribing clinician: 10/9/2024         Zulma Hernández MA  06/28/24, 10:43 EDT

## 2024-11-25 ENCOUNTER — TELEPHONE (OUTPATIENT)
Dept: INTERNAL MEDICINE | Facility: CLINIC | Age: 79
End: 2024-11-25
Payer: MEDICARE

## 2024-11-25 DIAGNOSIS — M54.9 BACK PAIN, UNSPECIFIED BACK LOCATION, UNSPECIFIED BACK PAIN LATERALITY, UNSPECIFIED CHRONICITY: Primary | ICD-10-CM

## 2024-11-25 NOTE — TELEPHONE ENCOUNTER
Caller: Dennis Ryan    Relationship: Self    Best call back number: [phone]    What is the medical concern/diagnosis: PULLED MUSCLE IN LOWER LEFT BACK     What is the provider, practice or medical service name:  [facility] [place]     What is the office phone number: [phone]    Any additional details: PATIENT STATES HE HAS BEEN GOING TO [facility] FOR THERAPY ON HIS BACK. NIKOLAS ADVISED THEY NEED A REFERRAL IN ORDER FOR VISITS TO BE PAID BY MEDICARE.   PATIENT STATES HE HAS AN APPOINTMENT AT [facility] TOMORROW 11-26-24 AND NEEDS REFERRAL SENT TODAY

## 2024-11-26 NOTE — TELEPHONE ENCOUNTER
Jocelin from [facility] called phone [phone]  patient Dennis Ryan is there today for appointment for his low back pain but they need referral today since he is there for appointment now [facility] fax number is [phone]

## 2024-11-26 NOTE — TELEPHONE ENCOUNTER
I can send a referral.  He cancelled his AWV - needs to be seen tomorrow so I can also document reason for PT referral as medicare requires.     Referral signed - needs to be sent.     FRANCES

## 2025-01-02 RX ORDER — ATORVASTATIN CALCIUM 20 MG/1
20 TABLET, FILM COATED ORAL DAILY
Qty: 90 TABLET | Refills: 1 | Status: SHIPPED | OUTPATIENT
Start: 2025-01-02

## 2025-01-28 ENCOUNTER — OFFICE VISIT (OUTPATIENT)
Age: 80
End: 2025-01-28
Payer: MEDICARE

## 2025-01-28 VITALS
DIASTOLIC BLOOD PRESSURE: 80 MMHG | SYSTOLIC BLOOD PRESSURE: 193 MMHG | HEIGHT: 69 IN | WEIGHT: 185 LBS | BODY MASS INDEX: 27.4 KG/M2

## 2025-01-28 DIAGNOSIS — I83.91 ASYMPTOMATIC VARICOSE VEINS OF RIGHT LOWER EXTREMITY: Primary | Chronic | ICD-10-CM

## 2025-01-28 PROCEDURE — 99213 OFFICE O/P EST LOW 20 MIN: CPT | Performed by: NURSE PRACTITIONER

## 2025-01-28 PROCEDURE — 1160F RVW MEDS BY RX/DR IN RCRD: CPT | Performed by: NURSE PRACTITIONER

## 2025-01-28 PROCEDURE — 1159F MED LIST DOCD IN RCRD: CPT | Performed by: NURSE PRACTITIONER

## 2025-01-28 PROCEDURE — 3079F DIAST BP 80-89 MM HG: CPT | Performed by: NURSE PRACTITIONER

## 2025-01-28 PROCEDURE — 3077F SYST BP >= 140 MM HG: CPT | Performed by: NURSE PRACTITIONER

## 2025-01-28 NOTE — PROGRESS NOTES
Chief Complaint  Varicose Veins    Subjective        Dennis Ryan presents to [facility] VASCULAR SURGERY  HPI   Dennis Ryan is a 79 y.o. male that has been referred to our office for right leg varicose veins.   He was seen in our office several several years ago after having a calf vein thombosis. He reports he has 5 brothers and sisters and they are concerned about the appearance of his veins so he was advised to come see us. He denies any swelling or pain to his right leg. He is compliant with compression stockings and has been wearing them daily for several years. He does have an area on his right foot with a cluster of spider veins, but denies any issues with ulceration.   Review of Systems   Constitutional:  Negative for fever.   Eyes:  Negative for visual disturbance.   Cardiovascular:  Negative for leg swelling.   Gastrointestinal:  Negative for abdominal pain.   Musculoskeletal:  Negative for back pain.   Skin:  Negative for color change, pallor and wound.   Neurological:  Negative for dizziness, facial asymmetry, speech difficulty and weakness.        Dennis Ryan  reports that he quit smoking about 57 years ago. His smoking use included cigarettes. He started smoking about 66 years ago. He has a 10 pack-year smoking history. He has never been exposed to tobacco smoke. He has never used smokeless tobacco..        Objective   Vital Signs:  Vitals:    01/28/25 1413   BP: (!) 193/80      Body mass index is 27.3 kg/m².           Physical Exam  Vitals reviewed.   Constitutional:       Appearance: Normal appearance.   HENT:      Head: Normocephalic.   Cardiovascular:      Rate and Rhythm: Normal rate and regular rhythm.      Pulses:           Dorsalis pedis pulses are 3+ on the right side and 3+ on the left side.        Posterior tibial pulses are 3+ on the right side and 3+ on the left side.   Pulmonary:      Effort: Pulmonary effort is normal.   Skin:     General: Skin is warm.    Neurological:      General: No focal deficit present.      Mental Status: He is alert and oriented to person, place, and time.   Psychiatric:         Mood and Affect: Mood normal.          Result Review :      CEAP Classification RLE: 1                     Assessment and Plan     Diagnoses and all orders for this visit:    1. Asymptomatic varicose veins of right lower extremity (Primary)             We have discussed the natural history and pathophysiology of venous insufficiency.  He is already wearing graded compression stockings.  He denies any worsening symptoms and is not interested in pursuing surgical treatment.  I do think is reasonable.  We will continue to treat him conservatively with compression stockings.  He will return on an as-needed basis.    Follow Up     No follow-ups on file.  Patient was given instructions and counseling regarding his condition or for health maintenance advice. Please see specific information pulled into the AVS if appropriate.     SEPIDEH Layton

## 2025-03-10 ENCOUNTER — TELEPHONE (OUTPATIENT)
Dept: CARDIOLOGY | Age: 80
End: 2025-03-10

## 2025-03-10 NOTE — TELEPHONE ENCOUNTER
Caller: Dennis Ryan    Relationship to patient: Self    Best call back number: [phone]    Chief complaint: NA    Type of visit: FU    Requested date: TBD     If rescheduling, when is the original appointment: 03.13.25     Additional notes:PATIENT CALLED TO RESCHEDULE PER CLINICAL STAFF REQUEST PROVIDER NO IN OFFICE THAT DAY. NOTHING AVAILABLE FOR SEVERAL MONTHS OUT. HUB ATTEMPTED WT TO PRACTICE, NO ANSWER, PHONE JUST RANG SO PATIENT CALLED BACK. PLEASE CALL AND ADVISE.

## 2025-04-03 ENCOUNTER — TELEPHONE (OUTPATIENT)
Dept: CARDIOLOGY | Age: 80
End: 2025-04-03

## 2025-04-03 NOTE — TELEPHONE ENCOUNTER
Caller: Dennis Ryan    Relationship to patient: Self    Best call back number: [phone]    Patient is needing: HUB RESCHEDULED PATIENT APPOINTMENT ON 03.21.25 WITH STEVIE BUNN TO FIRST AVAILABLE 06.27.25 WITH STEVIE BUNN AND ADDED TO WAIT LIST. PLEASE CONTACT PATIENT TO RESCHEDULE  IF THEY NEED TO BE SEEN SOONER. THANK YOU.

## 2025-05-21 ENCOUNTER — OFFICE VISIT (OUTPATIENT)
Dept: INTERNAL MEDICINE | Facility: CLINIC | Age: 80
End: 2025-05-21
Payer: MEDICARE

## 2025-05-21 VITALS
OXYGEN SATURATION: 96 % | HEART RATE: 65 BPM | SYSTOLIC BLOOD PRESSURE: 130 MMHG | WEIGHT: 189.4 LBS | DIASTOLIC BLOOD PRESSURE: 80 MMHG | HEIGHT: 69 IN | BODY MASS INDEX: 28.05 KG/M2

## 2025-05-21 DIAGNOSIS — R73.09 ELEVATED GLUCOSE: ICD-10-CM

## 2025-05-21 DIAGNOSIS — I65.23 BILATERAL CAROTID ARTERY STENOSIS: Chronic | ICD-10-CM

## 2025-05-21 DIAGNOSIS — Z87.442 HISTORY OF KIDNEY STONES: Chronic | ICD-10-CM

## 2025-05-21 DIAGNOSIS — Z00.00 MEDICARE ANNUAL WELLNESS VISIT, SUBSEQUENT: Primary | ICD-10-CM

## 2025-05-21 DIAGNOSIS — I25.10 CORONARY ARTERY DISEASE INVOLVING NATIVE CORONARY ARTERY OF NATIVE HEART WITHOUT ANGINA PECTORIS: Chronic | ICD-10-CM

## 2025-05-21 DIAGNOSIS — E78.2 MIXED HYPERLIPIDEMIA: Chronic | ICD-10-CM

## 2025-05-21 DIAGNOSIS — I10 ESSENTIAL HYPERTENSION: Chronic | ICD-10-CM

## 2025-05-21 DIAGNOSIS — I83.91 ASYMPTOMATIC VARICOSE VEINS OF RIGHT LOWER EXTREMITY: Chronic | ICD-10-CM

## 2025-05-21 LAB
ALBUMIN SERPL-MCNC: 4.5 G/DL (ref 3.5–5.2)
ALBUMIN/GLOB SERPL: 1.7 G/DL
ALP SERPL-CCNC: 94 U/L (ref 39–117)
ALT SERPL-CCNC: 20 U/L (ref 1–41)
AST SERPL-CCNC: 21 U/L (ref 1–40)
BILIRUB SERPL-MCNC: 0.7 MG/DL (ref 0–1.2)
BUN SERPL-MCNC: 16 MG/DL (ref 8–23)
BUN/CREAT SERPL: 16 (ref 7–25)
CALCIUM SERPL-MCNC: 9.6 MG/DL (ref 8.6–10.5)
CHLORIDE SERPL-SCNC: 102 MMOL/L (ref 98–107)
CHOLEST SERPL-MCNC: 147 MG/DL (ref 0–200)
CO2 SERPL-SCNC: 26.1 MMOL/L (ref 22–29)
CREAT SERPL-MCNC: 1 MG/DL (ref 0.76–1.27)
EGFRCR SERPLBLD CKD-EPI 2021: 76.1 ML/MIN/1.73
ERYTHROCYTE [DISTWIDTH] IN BLOOD BY AUTOMATED COUNT: 13 % (ref 12.3–15.4)
GLOBULIN SER CALC-MCNC: 2.6 GM/DL
GLUCOSE SERPL-MCNC: 105 MG/DL (ref 65–99)
HBA1C MFR BLD: 5.9 % (ref 4.8–5.6)
HCT VFR BLD AUTO: 52.6 % (ref 37.5–51)
HDLC SERPL-MCNC: 42 MG/DL (ref 40–60)
HGB BLD-MCNC: 16.6 G/DL (ref 13–17.7)
LDLC SERPL CALC-MCNC: 86 MG/DL (ref 0–100)
LDLC/HDLC SERPL: 2.01 {RATIO}
MCH RBC QN AUTO: 27.5 PG (ref 26.6–33)
MCHC RBC AUTO-ENTMCNC: 31.6 G/DL (ref 31.5–35.7)
MCV RBC AUTO: 87.1 FL (ref 79–97)
PLATELET # BLD AUTO: 224 10*3/MM3 (ref 140–450)
POTASSIUM SERPL-SCNC: 5.3 MMOL/L (ref 3.5–5.2)
PROT SERPL-MCNC: 7.1 G/DL (ref 6–8.5)
RBC # BLD AUTO: 6.04 10*6/MM3 (ref 4.14–5.8)
SODIUM SERPL-SCNC: 140 MMOL/L (ref 136–145)
TRIGL SERPL-MCNC: 103 MG/DL (ref 0–150)
VLDLC SERPL CALC-MCNC: 19 MG/DL (ref 5–40)
WBC # BLD AUTO: 7.12 10*3/MM3 (ref 3.4–10.8)

## 2025-05-21 NOTE — ASSESSMENT & PLAN NOTE
Lipid abnormalities are improving with treatment    Plan:  Continue same medication/s without change.    Continue lipitor and low fat diet.   Declined to increase statin dose in the past.     Discussed medication dosage, use, side effects, and goals of treatment in detail.    Counseled patient on lifestyle modifications to help control hyperlipidemia.     Patient Treatment Goals:   LDL goal is less than 70    Followup in 6 months.

## 2025-05-21 NOTE — PATIENT INSTRUCTIONS
Medicare Wellness  Personal Prevention Plan of Service     Date of Office Visit:    Encounter Provider:  Steve Tyler III, NP-C  Place of Service:  [facility] PRIMARY CARE  Patient Name: Dennis Ryan  :  1945    As part of the Medicare Wellness portion of your visit today, we are providing you with this personalized preventive plan of services (PPPS). This plan is based upon recommendations of the [place] Preventive Services Task Force (USPSTF) and the Advisory Committee on Immunization Practices (ACIP).    This lists the preventive care services that should be considered, and provides dates of when you are due. Items listed as completed are up-to-date and do not require any further intervention.    Health Maintenance   Topic Date Due    ANNUAL WELLNESS VISIT  2024    COVID-19 Vaccine ( season) 2024    LIPID PANEL  2025    INFLUENZA VACCINE  2025    COLORECTAL CANCER SCREENING  2028    RSV Vaccine - Adults  Completed    Pneumococcal Vaccine 50+  Completed    ZOSTER VACCINE  Completed    TDAP/TD VACCINES  Discontinued       No orders of the defined types were placed in this encounter.      Return in about 6 months (around 2025).

## 2025-05-21 NOTE — PROGRESS NOTES
The ABCs of the Annual Wellness Visit  Subsequent Medicare Wellness Visit    Subjective    Dennis Ryan is a 79 y.o. male who presents for a Subsequent Medicare Wellness Visit.    The following portions of the patient's history were reviewed and   updated as appropriate: allergies, current medications, past family history, past medical history, past social history, past surgical history, and problem list.    Wt Readings from Last 4 Encounters:   05/21/25 85.9 kg (189 lb 6.4 oz)   01/28/25 83.9 kg (185 lb)   06/06/24 87.8 kg (193 lb 9.6 oz)   04/03/24 88.5 kg (195 lb)       Weight trend is stable.    His cardiovascular risks are:    [] No Known risk factors  [x] Known CAD and being treated     [] Hypertension    [] Hyperlipidemia  [] Diabetes     [] Obesity  [] Family history    [] Current or hx tobacco use  [] Sedentary lifestyle       Male:   [] Testosterone use     Mobility    [x] Ambulates independently  [] Ambulates with cane   [] Ambulates with quad cane  [] Ambulates with rollator   [] Ambulates with walker   [] Mobile with wheelchair   [] Mobile with electric wheelchair     Continence    [x] Continent of bowel and bladder  [] Incontinent bowel and bladder   [] Incontinent bladder   [] Incontinent bowel      Social       Compared to one year ago, the patient feels his physical   health is the same.    Compared to one year ago, the patient feels his mental   health is the same.    Recent Hospitalizations:  He was not admitted to the hospital during the last year.       Current Medical Providers:  Patient Care Team:  Steve Tyler III, NP-C as PCP - General (Internal Medicine)  Juliann Lagos MD as Consulting Physician (Cardiology)  Russ León MD as Consulting Physician (Urology)  Car Manley MD as Consulting Physician (Otolaryngology)    Outpatient Medications Prior to Visit   Medication Sig Dispense Refill    aspirin 81 MG EC tablet Take 2 tablets by mouth Daily. 180 tablet 1     "atorvastatin (LIPITOR) 20 MG tablet TAKE 1 TABLET BY MOUTH DAILY 90 tablet 1     No facility-administered medications prior to visit.       No opioid medication identified on active medication list. I have reviewed chart for other potential  high risk medication/s and harmful drug interactions in the elderly.        Aspirin is on active medication list. Aspirin use is indicated based on review of current medical condition/s. Pros and cons of this therapy have been discussed today. Benefits of this medication outweigh potential harm.  Patient has been encouraged to continue taking this medication.  .      Patient Active Problem List   Diagnosis    Coronary artery disease involving native coronary artery of native heart without angina pectoris    White coat hypertension    Hyperlipidemia    Hypertension with heart disease    Left ventricular hypertrophy    Palpitations    Irregular heart rhythm    Stenosis of carotid artery    Acute right hip pain    Varicose veins of right lower extremity    Essential hypertension    History of kidney stones    Elevated glucose     Advance Care Planning  (Click this link to access ACP Navigator)      Advance Directive is not on file.  ACP discussion was held with the patient during this visit. Patient has an advance directive (not in EMR), copy requested.     Objective    Vitals:    05/21/25 1252   BP: 130/80   BP Location: Left arm   Patient Position: Sitting   Cuff Size: Adult   Pulse: 65   SpO2: 96%   Weight: 85.9 kg (189 lb 6.4 oz)   Height: 175.3 cm (69\")   PainSc: 0-No pain     Estimated body mass index is 27.97 kg/m² as calculated from the following:    Height as of this encounter: 175.3 cm (69\").    Weight as of this encounter: 85.9 kg (189 lb 6.4 oz).      Jump to Steadi Fall Risk Flowsheet  Gait and Balance Evaluation:  Normal    Does the patient have evidence of cognitive impairment? No          HEALTH RISK ASSESSMENT    Smoking Status:  Social History     Tobacco Use "   Smoking Status Former    Current packs/day: 0.00    Average packs/day: 1 pack/day for 10.0 years (10.0 ttl pk-yrs)    Types: Cigarettes    Start date: 1958    Quit date: 1967    Years since quittin.2    Passive exposure: Never   Smokeless Tobacco Never     Alcohol Consumption:  Social History     Substance and Sexual Activity   Alcohol Use No    Comment: No caffeine use     Fall Risk Screen:    JOSEE Fall Risk Assessment was completed, and patient is at LOW risk for falls.Assessment completed on:2025    Depression Screenin/21/2025    12:55 PM   PHQ-2/PHQ-9 Depression Screening   Little interest or pleasure in doing things Not at all   Feeling down, depressed, or hopeless Not at all       Health Habits and Functional and Cognitive Screenin/21/2025    12:55 PM   Functional & Cognitive Status   Do you have difficulty preparing food and eating? No   Do you have difficulty bathing yourself, getting dressed or grooming yourself? No   Do you have difficulty using the toilet? No   Do you have difficulty moving around from place to place? No   Do you have trouble with steps or getting out of a bed or a chair? No   Current Diet Other   Dental Exam Up to date   Eye Exam Up to date   Exercise (times per week) 3 times per week   Current Exercises Include Walking   Do you need help using the phone?  No   Are you deaf or do you have serious difficulty hearing?  No   Do you need help to go to places out of walking distance? No   Do you need help shopping? No   Do you need help preparing meals?  No   Do you need help with housework?  No   Do you need help with laundry? No   Do you need help taking your medications? No   Do you need help managing money? No   Do you ever drive or ride in a car without wearing a seat belt? No   Have you felt unusual stress, anger or loneliness in the last month? No   Who do you live with? Alone   If you need help, do you have trouble finding someone available to  you? No   Have you been bothered in the last four weeks by sexual problems? No   Do you have difficulty concentrating, remembering or making decisions? No       Age-appropriate Screening Schedule:  Refer to the list below for future screening recommendations based on patient's age, sex and/or medical conditions. Orders for these recommended tests are listed in the plan section. The patient has been provided with a written plan.    Health Maintenance   Topic Date Due    COVID-19 Vaccine (5 - 2024-25 season) 09/01/2024    LIPID PANEL  04/03/2025    INFLUENZA VACCINE  07/01/2025    ANNUAL WELLNESS VISIT  05/21/2026    COLORECTAL CANCER SCREENING  04/02/2028    RSV Vaccine - Adults  Completed    Pneumococcal Vaccine 50+  Completed    ZOSTER VACCINE  Completed    TDAP/TD VACCINES  Discontinued                CMS Preventative Services Quick Reference  Risk Factors Identified During Encounter  Routine exercise - work on weight loss.   Flu and COVID vaccine in the fall recommended.       The above risks/problems have been discussed with the patient.  Pertinent information has been shared with the patient in the After Visit Summary.  An After Visit Summary and PPPS were made available to the patient.    Follow Up:   Next Medicare Wellness visit to be scheduled in 1 year.       Additional E&M Note during same encounter follows:  Patient has multiple medical problems which are significant and separately identifiable that require additional work above and beyond the Medicare Wellness Visit.      Chief Complaint  Medicare Wellness-subsequent    Subjective        Dennis Ryan is also being seen today for AWV and follow up chronic conditions: hypertension, hyperlipidemia, CAD (2003: modest LAD), carotid stenosis (last doppler: 3/16/23: less than 50% stenosis), hx kidney stones (states was told no longer needs follow up with urology 2025), varicose veins      Hypertension: chronic   States cardiology stopped ACEI last year.  "  States at home: SBP usually around 110-115.   He was on midodrine briefly for low BP.   No CP, SOA    States rarely feels palpitations.  Only will notice when he lays down at night occassionally.      CAD: continues statin and ASA  He saw Dr Lagos on 2/6/24:      Hyperlipidemia:   LV: advised to increase statin to 40 mg daily and he declined stating cardiology did not want him to adjust dose.     Lab Results   Component Value Date    CHLPL 145 04/03/2024    TRIG 90 04/03/2024    HDL 40 04/03/2024    LDL 88 04/03/2024            Varicose veins: saw vascular 1/28/25  Office Visit with Ghazala Stevens APRN (01/28/2025)   Advised to continue compression garment right lower extremity.         Objective   Vital Signs:  /80 (BP Location: Left arm, Patient Position: Sitting, Cuff Size: Adult)   Pulse 65   Ht 175.3 cm (69\")   Wt 85.9 kg (189 lb 6.4 oz)   SpO2 96%   BMI 27.97 kg/m²     Physical Exam  Vitals reviewed.   Constitutional:       Appearance: Normal appearance. He is well-developed.   Neck:      Thyroid: No thyromegaly.   Cardiovascular:      Rate and Rhythm: Normal rate and regular rhythm.      Pulses: Normal pulses.      Heart sounds: Normal heart sounds.   Pulmonary:      Effort: Pulmonary effort is normal.      Breath sounds: Normal breath sounds.      Comments: E/U   Abdominal:      General: Bowel sounds are normal.   Musculoskeletal:      Cervical back: Normal range of motion and neck supple.      Comments: Right lower extremity: compression garment: knee high    Lymphadenopathy:      Cervical: No cervical adenopathy.   Skin:     General: Skin is warm and dry.      Capillary Refill: Capillary refill takes 2 to 3 seconds.   Neurological:      Mental Status: He is alert and oriented to person, place, and time.   Psychiatric:         Mood and Affect: Mood normal.         Behavior: Behavior normal. Behavior is cooperative.         Thought Content: Thought content normal.         Judgment: " Judgment normal.          The following data was reviewed by: Steve Tyler III, NP-C on 05/21/2025:               Assessment and Plan     Problem List Items Addressed This Visit          Cardiac and Vasculature    Varicose veins of right lower extremity (Chronic)    Overview   Evaluated by Dr Mai: continues compression garment right lower extremity          Stenosis of carotid artery (Chronic)    Overview   10/14/2021: Mild bilateral internal carotid artery plaque without any significant stenosis.  3/16/2023: less than 50% stenosis bl            Coronary artery disease involving native coronary artery of native heart without angina pectoris (Chronic)    Overview   2003: heart cath: 50% LAD: medication management only   RF: former smoker   FH: Mother: heart disease         Current Assessment & Plan   Continue asa and statin          Relevant Orders    Comprehensive Metabolic Panel    Lipid Panel With LDL / HDL Ratio    Essential hypertension (Chronic)    Current Assessment & Plan   BP stable off ACEI  No CP, SOA          Hyperlipidemia (Chronic)    Overview   Lipitor since 2003          Current Assessment & Plan    Lipid abnormalities are improving with treatment    Plan:  Continue same medication/s without change.    Continue lipitor and low fat diet.   Declined to increase statin dose in the past.     Discussed medication dosage, use, side effects, and goals of treatment in detail.    Counseled patient on lifestyle modifications to help control hyperlipidemia.     Patient Treatment Goals:   LDL goal is less than 70    Followup in 6 months.         Relevant Orders    Comprehensive Metabolic Panel    Lipid Panel With LDL / HDL Ratio    CBC (No Diff)       Endocrine and Metabolic    Elevated glucose    Current Assessment & Plan   Continue diet modification. Limit carbs and sugars.   Will check A1C today.          Relevant Orders    Hemoglobin A1c       Genitourinary and Reproductive     History of  kidney stones (Chronic)    Overview   States Dr León advised only needs to return for follow up as needed.            Other Visit Diagnoses         Medicare annual wellness visit, subsequent    -  Primary           Additionally, I am providing longitudinal care which includes continuously being a focal point for all of the patient's health care services and ongoing medical care related to hypertension, coronary heart disease, impaired fasting glucose, and dyslipidemias as documented above.               Follow Up     Return in about 6 months (around 11/21/2025).    Patient was given instructions and counseling regarding his condition or for health maintenance advice. Please see specific information pulled into the AVS if appropriate.

## 2025-05-22 ENCOUNTER — RESULTS FOLLOW-UP (OUTPATIENT)
Dept: INTERNAL MEDICINE | Facility: CLINIC | Age: 80
End: 2025-05-22
Payer: MEDICARE

## 2025-05-27 NOTE — TELEPHONE ENCOUNTER
Name: Dennis Ryan      Relationship: Self      Best Callback Number: [phone]      [facility] PROVIDED THE RELAY MESSAGE FROM THE OFFICE      PATIENT: HAS FURTHER QUESTIONS AND WOULD LIKE A CALL BACK AT THE FOLLOWING PHONE [phone]    ADDITIONAL INFORMATION:PATIENT ASKS WHAT THE NUMBER WAS ON GLUCOSE READING  PLEASE ADVISE

## 2025-05-28 NOTE — TELEPHONE ENCOUNTER
BAN CALLED FROM [facility] [phone]    THANK YOU   Call xfer from [facility], spoke with pt and provided glucose/A1C and lipid panels numbers as requested. Printed lab letter out and placed in mail basket for pt as pt has no MyChart access

## 2025-06-26 NOTE — PROGRESS NOTES
Date of Office Visit: 2025  Encounter Provider: SEPIDEH Pratt  Place of Service: [facility] CARDIOLOGY  Patient Name: Dennis Ryan  :1945    Chief complaint  CAD, LVH     History of Present Illness  Patient is a 79 y.o. year old male  patient of Dr. Lagos. Past medical history includes hypertension and hyperlipidemia who was diagnosed in  with modest disease of the LAD.  He has been seen intermittently since then.  He had an echocardiogram in  which revealed normal systolic function with left ventricular hypertrophy and mild mitral regurgitation.  In  he also wore an event monitor which showed no arrhythmia he had a stress echocardiogram on 2019 that showed normal systolic function mild left ventricular hypertrophy, mild valve regurgitation with aortic valve calcification and no significant ischemia at 8 METS.  Doppler ultrasound 10/2021 showed mild bilateral carotid plaque unchanged from 2019.      On 2024 patient had an episode of acute weakness, dizziness, and presyncope.  This occurred after he returned home from a 4 mile walk and had prepared his meal.  He was sitting at the kitchen table.  The episode lasted for about 5 minutes.  He did not lose consciousness and he did not check his blood pressure at the time of the event.  He did not notice any palpitations, chest pain, nausea, or arm/jaw pain with this episode. Subsequent echocardiogram showed LVEF 56.3%, grade 1 diastolic dysfunction, mild calcification of the aortic valve with no stenosis and mild aortic regurgitation as well as mild mitral regurgitation with no stenosis.  Holter monitor showed 8 runs of supraventricular tachycardia and 1 episode of ventricular tachycardia that were brief in nature.  Average heart rate on the monitor was 63 bpm.    Interval history  Patient presents today for routine follow-up.  I will visit with him today and have reviewed his medical record.   Since last visit he feels the palpitations have improved.  He denies shortness of breath, edema, dizziness, chest pain or chest pressure, fatigue, syncope or presyncope.  Blood pressure in office today is significantly elevated.  Blood pressure at home is usually 110-140/60-70.  He is currently not on any antihypertensive therapy. He is walking 4 miles in 1hour 20 minutes (20 minute mile) at least 3-4 times per week with no anginal symptoms.    Past Medical History:   Diagnosis Date    Cardiovascular risk factor     No diabetes.  No hypertension.  Positive for hyperlipidemia.  Positive for family history of coronary artery disease with father with CAD age 60s, mother with heart problem at age 84, brother and sister x2 with hypertension    Coronary artery disease     Heart palpitations     Hyperlipidemia     Hypertension     Kidney stone     Left ventricular hypertrophy     Palpitations      Past Surgical History:   Procedure Laterality Date    APPENDECTOMY      CORONARY ANGIOPLASTY WITH STENT PLACEMENT  2003    Per procedure note: stent not placed.    LAD 50% occlusion    CYSTOSCOPY W/ URETERAL STENT PLACEMENT Left 06/17/2018    Procedure: CYSTOSCOPY, LEFT STENT PLACEMENT;  Surgeon: Jaydon Pereyra Jr., MD;  Location: [facility];  Service: Urology    HERNIA REPAIR      OTHER SURGICAL HISTORY      groin rupture     PROSTATE SURGERY       Outpatient Medications Prior to Visit   Medication Sig Dispense Refill    aspirin 81 MG EC tablet Take 2 tablets by mouth Daily. 180 tablet 1    atorvastatin (LIPITOR) 20 MG tablet TAKE 1 TABLET BY MOUTH DAILY 90 tablet 1     No facility-administered medications prior to visit.       Allergies as of 06/27/2025    (No Known Allergies)     Social History     Socioeconomic History    Marital status: Single   Tobacco Use    Smoking status: Former     Current packs/day: 0.00     Average packs/day: 1 pack/day for 10.0 years (10.0 ttl pk-yrs)     Types: Cigarettes     Start date:  "1958     Quit date: 1967     Years since quittin.3     Passive exposure: Never    Smokeless tobacco: Never   Vaping Use    Vaping status: Never Used   Substance and Sexual Activity    Alcohol use: No     Comment: No caffeine use    Drug use: No    Sexual activity: Defer     Family History   Problem Relation Age of Onset    Other Mother         CARDIAC DISORDER    Other Father     Stroke Father         CEREBROVASCULAR    Heart disease Father     Diabetes Father     Hypertension Other     Cancer Sister      Review of Systems   Constitutional: Negative for malaise/fatigue.   Cardiovascular:  Positive for palpitations. Negative for chest pain, claudication, dyspnea on exertion, leg swelling, near-syncope, orthopnea, paroxysmal nocturnal dyspnea and syncope.   Respiratory:  Negative for shortness of breath.    Neurological:  Negative for brief paralysis, dizziness, headaches and light-headedness.   All other systems reviewed and are negative.       Objective:     Vitals:    25 1331   BP: 178/82   Patient Position: Sitting   Cuff Size: Adult   Pulse: 66   SpO2: 97%   Weight: 88.9 kg (196 lb)   Height: 175.3 cm (69.02\")     Body mass index is 28.93 kg/m².    Vitals reviewed.   Constitutional:       General: Not in acute distress.     Appearance: Well-developed and not in distress. Not diaphoretic.   HENT:      Head: Normocephalic.   Pulmonary:      Effort: Pulmonary effort is normal. No respiratory distress.      Breath sounds: Normal breath sounds. No wheezing. No rhonchi. No rales.   Cardiovascular:      Normal rate. Regular rhythm.      Murmurs: There is no murmur.   Pulses:     Radial: 2+ bilaterally.  Edema:     Peripheral edema absent.   Skin:     General: Skin is warm and dry. There is no cyanosis.      Findings: No rash.   Neurological:      Mental Status: Alert and oriented to person, place, and time.   Psychiatric:         Behavior: Behavior normal. Behavior is cooperative.         Thought " "Content: Thought content normal.         Judgment: Judgment normal.       Lab Review:     Lab Results   Component Value Date     05/21/2025     04/03/2024    K 5.3 (H) 05/21/2025    K 5.0 04/03/2024     05/21/2025     04/03/2024    CO2 26.1 05/21/2025    CO2 25.4 04/03/2024    BUN 16 05/21/2025    BUN 19 04/03/2024    CREATININE 1.00 05/21/2025    CREATININE 1.03 04/03/2024    EGFRIFNONA 89 04/01/2021    EGFRIFNONA 85 05/21/2019    EGFRIFAFRI 108 04/01/2021    EGFRIFAFRI 103 05/21/2019    GLUCOSE 105 (H) 05/21/2025    GLUCOSE 103 (H) 04/03/2024    CALCIUM 9.6 05/21/2025    CALCIUM 9.5 04/03/2024    ALBUMIN 4.5 05/21/2025    ALBUMIN 4.5 04/03/2024    BILITOT 0.7 05/21/2025    BILITOT 0.5 04/03/2024    AST 21 05/21/2025    AST 20 04/03/2024    ALT 20 05/21/2025    ALT 22 04/03/2024     Lab Results   Component Value Date    WBC 7.12 05/21/2025    WBC 6.51 05/31/2023    HGB 16.6 05/21/2025    HGB 16.4 05/31/2023    HCT 52.6 (H) 05/21/2025    HCT 48.8 05/31/2023    MCV 87.1 05/21/2025    MCV 84.0 05/31/2023     05/21/2025     05/31/2023     No results found for: \"PROBNP\", \"BNP\"  Lab Results   Component Value Date    CKTOTAL 119 06/10/2015    TROPONINT <0.010 01/31/2017     Lab Results   Component Value Date    TSH 1.890 05/31/2023    TSH 1.960 04/01/2021      Lipid Panel          5/21/2025    13:30   Lipid Panel   Total Cholesterol 147    Triglycerides 103    HDL Cholesterol 42    VLDL Cholesterol 19    LDL Cholesterol  86    LDL/HDL Ratio 2.01           ECG 12 Lead    Date/Time: 6/27/2025 1:48 PM  Performed by: Genoveva Guerra APRN    Authorized by: Genoveva Guerra APRN  Comparison: compared with previous ECG   Similar to previous ECG  Rhythm: sinus rhythm  Rate: normal  BPM: 66  Conduction: 1st degree AV block  QRS axis: normal  Other findings: non-specific ST-T wave changes  Comments: Similar to prior        Assessment:       Diagnosis Plan   1. Hypertension with heart " disease        2. LVH (left ventricular hypertrophy)        3. Dizziness        4. Coronary artery disease involving native coronary artery of native heart without angina pectoris        5. Palpitations          Plan:       1.  Modest coronary artery disease.  Negative stress echo 2019 and no anginal symptoms.   He is very active, walking 4 miles in just over an hour.  He has no anginal symptoms with this.  I asked him to let us know if he develops any chest pain, shortness of breath, or fatigue with this and would recommend stress test at that time as it has been many years since his last ischemic evaluation.  2.  Hypertension with hypertensive heart disease.  Blood pressure at home appears well-controlled.  He was previously on midodrine but has not been on this recently.  I asked him to bring his device to have it checked for accuracy and he states he will take it to PCP office.  3.  Carotid artery disease with plaque noted bilaterally 10/2021.  Repeat carotid Doppler in 2024 with bilateral plaque and no stenosis.  4.  Palpitations.  Patient had a 30-day monitor without arrhythmia in 2016.  Recent Holter monitor with brief episodes of SVT and 1 episode of NSVT lasting 4 beats.  Overall heart rate is in the low 60s and palpitations are not bothersome to him.  Continues with first-degree AV block on EKG today.  Will try to avoid AV qi blocker therapy if possible.  Palpitations have subsequently improved with increased exercise.  5.  Elevated glucose,.  This has improved.  Followed by PCP  6.  Hyperlipidemia.  LDL slightly elevated.  Following with PCP      Time Spent: I spent 30 minutes caring for Dennis on this date of service. This time includes time spent by me in the following activities: preparing for the visit, reviewing tests, performing a medically appropriate examination and/or evaluations, counseling and educating the patient/family/caregiver, ordering medications, tests, or procedures, documenting  information in the medical record, and independently interpreting results and communicating that information with the patient/family/caregiver.   I spent 1 minutes on the separately reported service of ECG. This time is not included in the time used to support the E/M service also reported today.        Your medication list            Accurate as of June 27, 2025  1:49 PM. If you have any questions, ask your nurse or doctor.                CONTINUE taking these medications        Instructions Last Dose Given Next Dose Due   aspirin 81 MG EC tablet      Take 2 tablets by mouth Daily.       atorvastatin 20 MG tablet  Commonly known as: LIPITOR      TAKE 1 TABLET BY MOUTH DAILY                Patient is no longer taking -.  I corrected the med list to reflect this.  I did not stop these medications.    Return in about 6 months (around 12/27/2025) for with Dr. Lagos.      Dictated utilizing Dragon dictation

## 2025-06-27 ENCOUNTER — OFFICE VISIT (OUTPATIENT)
Dept: CARDIOLOGY | Age: 80
End: 2025-06-27
Payer: MEDICARE

## 2025-06-27 VITALS
HEIGHT: 69 IN | WEIGHT: 196 LBS | DIASTOLIC BLOOD PRESSURE: 82 MMHG | BODY MASS INDEX: 29.03 KG/M2 | SYSTOLIC BLOOD PRESSURE: 178 MMHG | OXYGEN SATURATION: 97 % | HEART RATE: 66 BPM

## 2025-06-27 DIAGNOSIS — I25.10 CORONARY ARTERY DISEASE INVOLVING NATIVE CORONARY ARTERY OF NATIVE HEART WITHOUT ANGINA PECTORIS: ICD-10-CM

## 2025-06-27 DIAGNOSIS — R42 DIZZINESS: ICD-10-CM

## 2025-06-27 DIAGNOSIS — R00.2 PALPITATIONS: ICD-10-CM

## 2025-06-27 DIAGNOSIS — I11.9 HYPERTENSION WITH HEART DISEASE: Primary | ICD-10-CM

## 2025-06-27 DIAGNOSIS — I51.7 LVH (LEFT VENTRICULAR HYPERTROPHY): ICD-10-CM

## 2025-07-07 RX ORDER — ATORVASTATIN CALCIUM 20 MG/1
20 TABLET, FILM COATED ORAL DAILY
Qty: 90 TABLET | Refills: 1 | Status: SHIPPED | OUTPATIENT
Start: 2025-07-07

## (undated) DEVICE — NITINOL WIRE WITH HYDROPHILIC TIP: Brand: SENSOR

## (undated) DEVICE — GLV SURG SENSICARE MICRO PF LF 7 STRL

## (undated) DEVICE — SYR LUERLOK 20CC

## (undated) DEVICE — LOU CYSTO: Brand: MEDLINE INDUSTRIES, INC.

## (undated) DEVICE — TIDISHIELD UROLOGY DRAIN BAGS FROSTY VINYL STERILE FITS SIEMENS UROSKOP ACCESS 20 PER CASE: Brand: TIDISHIELD

## (undated) DEVICE — GOWN,NON-REINFORCED,SIRUS,SET IN SLV,XL: Brand: MEDLINE

## (undated) DEVICE — CATH URETRL FLXITP POLLACK STD 5F 70CM